# Patient Record
Sex: MALE | URBAN - METROPOLITAN AREA
[De-identification: names, ages, dates, MRNs, and addresses within clinical notes are randomized per-mention and may not be internally consistent; named-entity substitution may affect disease eponyms.]

---

## 2022-06-14 NOTE — PROGRESS NOTES
Clinic Note    Reason for visit:  There were no encounter diagnoses.    PCP: Primary Doctor No       HPI:  This is a 81 y.o. male with a h/o GERD on Zegerid and pepcid.  Had a capsule lodge in upper esophagus.  He had an CXR that was normal. He denies dysphagia to food.  Had an EGD w/DIL 8/21.  He reports weight gain-poor dietary habits.          Review of Systems   Constitutional: Positive for fatigue. Negative for chills, diaphoresis, fever and unexpected weight change.   HENT: Positive for trouble swallowing. Negative for hearing loss, mouth sores, nosebleeds, postnasal drip, sore throat and voice change.    Eyes: Negative for pain, discharge and eye dryness.   Respiratory: Positive for cough and shortness of breath. Negative for apnea, choking, chest tightness and wheezing.    Cardiovascular: Negative for chest pain, palpitations, leg swelling and claudication.   Gastrointestinal: Positive for reflux. Negative for abdominal distention, abdominal pain, anal bleeding, blood in stool, change in bowel habit, constipation, diarrhea, nausea, rectal pain, vomiting, fecal incontinence and change in bowel habit.   Genitourinary: Positive for frequency. Negative for bladder incontinence, difficulty urinating, dysuria, flank pain and hematuria.   Musculoskeletal: Positive for arthralgias. Negative for back pain, joint swelling and joint deformity.   Integumentary:  Negative for color change, rash and wound.   Allergic/Immunologic: Negative for environmental allergies and food allergies.   Neurological: Negative for seizures, facial asymmetry, speech difficulty, weakness, headaches and memory loss.   Hematological: Negative for adenopathy. Does not bruise/bleed easily.   Psychiatric/Behavioral: Positive for agitation. Negative for behavioral problems, confusion, hallucinations and sleep disturbance.      Past Medical History:   Diagnosis Date    Prostate cancer      Past Surgical History:   Procedure Laterality Date     "APPENDECTOMY      RADICAL PROSTATECTOMY      TONSILLECTOMY       Family History   Problem Relation Age of Onset    Heart disease Mother     Heart disease Father      Social History     Tobacco Use    Smoking status: Former Smoker     Review of patient's allergies indicates:  No Known Allergies           Vital Signs:  BP (P) 122/76 (BP Location: Right arm, Patient Position: Sitting)   Pulse (P) 69   Ht (P) 5' 7" (1.702 m)   Wt (P) 81 kg (178 lb 9.6 oz)   SpO2 (!) (P) 93%   BMI (P) 27.97 kg/m²   Body mass index is 27.97 kg/m² (pended).      Physical Exam  Constitutional:       General: He is not in acute distress.     Appearance: Normal appearance. He is well-developed. He is not ill-appearing or toxic-appearing.   HENT:      Head: Normocephalic and atraumatic.      Nose: Nose normal.      Mouth/Throat:      Mouth: Mucous membranes are moist.      Pharynx: Oropharynx is clear. No oropharyngeal exudate or posterior oropharyngeal erythema.   Eyes:      General: Lids are normal. No scleral icterus.        Right eye: No discharge.         Left eye: No discharge.      Extraocular Movements: Extraocular movements intact.      Conjunctiva/sclera: Conjunctivae normal.   Cardiovascular:      Rate and Rhythm: Normal rate and regular rhythm.      Pulses:           Radial pulses are 2+ on the right side and 2+ on the left side.   Pulmonary:      Effort: Pulmonary effort is normal. No respiratory distress.      Breath sounds: No stridor. No wheezing or rhonchi.   Abdominal:      General: Bowel sounds are normal. There is no distension.      Palpations: Abdomen is soft. There is no fluid wave, hepatomegaly, splenomegaly or mass.      Tenderness: There is no abdominal tenderness. There is no guarding or rebound.   Musculoskeletal:      Cervical back: Full passive range of motion without pain.      Right lower leg: No edema.      Left lower leg: No edema.   Lymphadenopathy:      Cervical: No cervical adenopathy.   Skin:     " General: Skin is warm and dry.      Capillary Refill: Capillary refill takes less than 2 seconds.      Coloration: Skin is not cyanotic, jaundiced or pale.      Findings: No rash.   Neurological:      General: No focal deficit present.      Mental Status: He is alert and oriented to person, place, and time.   Psychiatric:         Mood and Affect: Mood normal.         Behavior: Behavior is cooperative.            All of the data above and below has been reviewed by myself and any further interpretations will be reflected in the assessment and plan.   The data includes review of external notes, and independent interpretation of lab results, procedures, x-rays, and imaging reports.      Assessment:  There are no diagnoses linked to this encounter.     Recommendations:  Esophagram.    If esophagram is negative will consider MRI of neck.    Patient screened for and received weight management and nutritional counseling regarding BMI of less than 18 or greater than 25.    Risks, benefits, and alternatives of medical management, any associated procedures, and/or treatment discussed with the patient. Patient given opportunity to ask questions and voices understanding. Patient has elected to proceed with the recommended care modalities as discussed.    No follow-ups on file.    Order summary:  No orders of the defined types were placed in this encounter.         Yordy Sharp MD    This document may have been created using a voice recognition transcribing system. Incorrect words or phrases may have been missed during proofreading. Please interpret accordingly or contact me for clarification.

## 2022-06-15 ENCOUNTER — TELEPHONE (OUTPATIENT)
Dept: GASTROENTEROLOGY | Facility: CLINIC | Age: 82
End: 2022-06-15

## 2022-06-15 ENCOUNTER — OFFICE VISIT (OUTPATIENT)
Dept: GASTROENTEROLOGY | Facility: CLINIC | Age: 82
End: 2022-06-15
Payer: MEDICARE

## 2022-06-15 DIAGNOSIS — R13.19 ESOPHAGEAL DYSPHAGIA: Primary | ICD-10-CM

## 2022-06-15 DIAGNOSIS — K21.9 GASTROESOPHAGEAL REFLUX DISEASE, UNSPECIFIED WHETHER ESOPHAGITIS PRESENT: ICD-10-CM

## 2022-06-15 PROCEDURE — 99203 PR OFFICE/OUTPT VISIT, NEW, LEVL III, 30-44 MIN: ICD-10-PCS | Mod: S$GLB,,, | Performed by: INTERNAL MEDICINE

## 2022-06-15 PROCEDURE — 1160F PR REVIEW ALL MEDS BY PRESCRIBER/CLIN PHARMACIST DOCUMENTED: ICD-10-PCS | Mod: CPTII,S$GLB,, | Performed by: INTERNAL MEDICINE

## 2022-06-15 PROCEDURE — 1159F MED LIST DOCD IN RCRD: CPT | Mod: CPTII,S$GLB,, | Performed by: INTERNAL MEDICINE

## 2022-06-15 PROCEDURE — 1160F RVW MEDS BY RX/DR IN RCRD: CPT | Mod: CPTII,S$GLB,, | Performed by: INTERNAL MEDICINE

## 2022-06-15 PROCEDURE — 99203 OFFICE O/P NEW LOW 30 MIN: CPT | Mod: S$GLB,,, | Performed by: INTERNAL MEDICINE

## 2022-06-15 PROCEDURE — 1159F PR MEDICATION LIST DOCUMENTED IN MEDICAL RECORD: ICD-10-PCS | Mod: CPTII,S$GLB,, | Performed by: INTERNAL MEDICINE

## 2022-06-15 RX ORDER — OMEPRAZOLE AND SODIUM BICARBONATE 40; 1100 MG/1; MG/1
1 CAPSULE ORAL
COMMUNITY

## 2022-06-15 RX ORDER — FAMOTIDINE 20 MG/1
20 TABLET, FILM COATED ORAL 2 TIMES DAILY
COMMUNITY

## 2022-06-15 NOTE — TELEPHONE ENCOUNTER
----- Message from Irene Farrell sent at 6/15/2022 12:31 PM CDT -----  Regarding: CJ Almanza calling from [facility] Radiology Department calling for patient orders. Call back number [phone] option 1 and 3. Tks

## 2022-06-15 NOTE — TELEPHONE ENCOUNTER
Order was printed and given to patient due to request to have imaging done at [facility]. Order faxed and message left for patient.   ----- Message from Lynne Copeland sent at 6/15/2022 10:35 AM CDT -----  Regarding: pt advice  Contact: Pt  Pt is calling to find out where the order for esophogram was sent. Please call back at  [phone]//thank you acc

## 2022-06-15 NOTE — TELEPHONE ENCOUNTER
Returned Cami's call from [facility] Radiology Dept and told her that the orders were given to the pt, but I can fax them over. Faxing to [phone]. [facility]

## 2022-06-16 DIAGNOSIS — R13.19 ESOPHAGEAL DYSPHAGIA: Primary | ICD-10-CM

## 2022-06-16 NOTE — PROGRESS NOTES
MRI of soft tissue of neck recommended by Dr. Sharp. Order created and faxed to [facility].   [facility] [place] [facility]

## 2022-07-01 NOTE — PROGRESS NOTES
6/22/2022: MRI of soft tissue neck: No definite soft tissue abnormality seen. Degenerative changes noted in spine with mild levoscoliosis.

## 2023-04-19 ENCOUNTER — TELEPHONE (OUTPATIENT)
Dept: GASTROENTEROLOGY | Facility: CLINIC | Age: 83
End: 2023-04-19
Payer: MEDICARE

## 2023-04-19 DIAGNOSIS — R10.11 RUQ PAIN: Primary | ICD-10-CM

## 2023-04-19 DIAGNOSIS — R11.0 NAUSEA: ICD-10-CM

## 2023-04-19 NOTE — TELEPHONE ENCOUNTER
I spoke to Dr Whitley to let him know that an order for his US of the Abdomin was sent in to [facility] scheduling for him.

## 2023-04-19 NOTE — PROGRESS NOTES
Spoke to patient-having nausea and early satiety type symptoms-no significant abdominal pain.  He had labs done which were normal per patient.  He is under a lot of stress with wife recently having surgery on her back. Discussed increasing his Zegerid to 2 OTC per day.  We will set up abdominal U/S.  Asked if he wanted some Zofran-he declined.

## 2023-04-28 ENCOUNTER — TELEPHONE (OUTPATIENT)
Dept: GASTROENTEROLOGY | Facility: CLINIC | Age: 83
End: 2023-04-28
Payer: MEDICARE

## 2023-04-28 NOTE — TELEPHONE ENCOUNTER
Returned call to pt, he cancelled his appt for next week because his wife just had back surgery.  Gave him the results of his abd U/S, he has not really had nausea problems lately; he will call us if the nausea returns so we can order an EGD.

## 2023-04-28 NOTE — TELEPHONE ENCOUNTER
----- Message from Ayse French sent at 4/28/2023 12:06 PM CDT -----  Contact: self  Type - Patient Returning Call    Patient called in stating he received call/voicemail. I was informed clinic is on lunch. May I have someone reach back out @ [phone] - thanks!

## 2023-07-03 ENCOUNTER — TELEPHONE (OUTPATIENT)
Dept: GASTROENTEROLOGY | Facility: CLINIC | Age: 83
End: 2023-07-03
Payer: MEDICARE

## 2023-07-03 ENCOUNTER — PATIENT MESSAGE (OUTPATIENT)
Dept: GASTROENTEROLOGY | Facility: CLINIC | Age: 83
End: 2023-07-03
Payer: MEDICARE

## 2023-07-03 NOTE — TELEPHONE ENCOUNTER
----- Message from Ely Macdonald sent at 7/3/2023  3:54 PM CDT -----  Regarding: Cancellation  Contact: patient  Per phone call with patient, he would like to cancel the appointment on 07/10/2023 for the swallowing issues.  Please return call at [phone] (home).    Thanks,  SJ

## 2023-07-03 NOTE — TELEPHONE ENCOUNTER
Received patients message regarding cancelling his appointment. Cancelled patient appointment in Epic and sent patient a message via portal to let him know to contact our office if/when he is ready to reschedule. IRANA

## 2023-07-05 ENCOUNTER — TELEPHONE (OUTPATIENT)
Dept: GASTROENTEROLOGY | Facility: CLINIC | Age: 83
End: 2023-07-05
Payer: MEDICARE

## 2023-07-05 ENCOUNTER — DOCUMENTATION ONLY (OUTPATIENT)
Dept: GASTROENTEROLOGY | Facility: CLINIC | Age: 83
End: 2023-07-05
Payer: MEDICARE

## 2023-07-05 DIAGNOSIS — R13.10 DYSPHAGIA, UNSPECIFIED TYPE: Primary | ICD-10-CM

## 2023-07-05 DIAGNOSIS — R11.0 NAUSEA: ICD-10-CM

## 2023-07-05 NOTE — TELEPHONE ENCOUNTER
----- Message from Yordy Sharp MD sent at 7/5/2023  8:43 AM CDT -----  Regarding: RE: Follow Up  Contact: patient  Set up for EGD-DX: dysphagia, reflux. Instead of clinic visit  ----- Message -----  From: Felicia Phillips MA  Sent: 7/3/2023   2:55 PM CDT  To: Yordy Sharp MD  Subject: FW: Follow Up                                      ----- Message -----  From: Avni Langston  Sent: 7/3/2023   2:45 PM CDT  To: Felicia Phillips MA  Subject: RE: Follow Up                                    Pt is lou for 7/10/23 @ 1200 and pt is aware of date and time of apt. Also pt wants to know if aron would possibly just send him to have the proc rather than seeing him in office. 7/3/23 @ 2:45 la  ----- Message -----  From: Felicia Phillips MA  Sent: 7/3/2023   2:30 PM CDT  To: Avni Langston  Subject: FW: Follow Up                                    This is Dr. Whitley#  ----- Message -----  From: Ely Macdonald  Sent: 7/3/2023   1:40 PM CDT  To: [facility] Staff  Subject: Follow Up                                        Per phone call with patient, he would like to schedule and appointment for a follow up esophagus.  Please return call at [phone] (home).    Thanks,  SJ

## 2023-07-05 NOTE — PROGRESS NOTES
"[facility]  [address] [place] [address]  Phone: [phone]  Fax: [phone]    History & Physical         Provider: Dr. Yordy Sharp    Patient Name: Bi TURK (age):1940  82 y.o.           Gender: male   Phone: [phone]     Referring Physician: Kevan French     Vital Signs:   Height - 5'7"  Weight - 178 lbs  BMI -  27.9    Plan: EGD @ [facility]    Dysphagia   Nausea      History:      Past Medical History:   Diagnosis Date    Francois esophagus     Prostate cancer       Past Surgical History:   Procedure Laterality Date    APPENDECTOMY      RADICAL PROSTATECTOMY      TONSILLECTOMY        Medication List with Changes/Refills   Current Medications    FAMOTIDINE (PEPCID) 20 MG TABLET    Take 20 mg by mouth 2 (two) times daily.    OMEPRAZOLE-SODIUM BICARBONATE (ZEGERID) 40-1.1 MG-GRAM PER CAPSULE    Take 1 capsule by mouth before breakfast.      Review of patient's allergies indicates:  No Known Allergies   Family History   Problem Relation Age of Onset    Heart disease Mother     Heart disease Father       Social History     Tobacco Use    Smoking status: Former        Physical Examination:     General Appearance:___________________________  HEENT: _____________________________________  Abdomen:____________________________________  Heart:________________________________________  Lungs:_______________________________________  Extremities:___________________________________  Skin:_________________________________________  Endocrine:____________________________________  Genitourinary:_________________________________  Neurological:__________________________________      Patient has been evaluated immediately prior to sedation and is medically cleared for endoscopy with IVCS as an ASA class: ______      Physician Signature: _________________________       Date: ________  Time: " ________

## 2023-07-10 ENCOUNTER — TELEPHONE (OUTPATIENT)
Dept: GASTROENTEROLOGY | Facility: CLINIC | Age: 83
End: 2023-07-10

## 2023-07-10 RX ORDER — LISDEXAMFETAMINE DIMESYLATE CAPSULES 20 MG/1
20 CAPSULE ORAL EVERY MORNING
COMMUNITY

## 2023-07-10 NOTE — TELEPHONE ENCOUNTER
Schedule him on 7/19/2023 at [facility]. Hold Vyvanse 2 days before procedure.

## 2023-07-10 NOTE — TELEPHONE ENCOUNTER
Pt states he cant do the 19 because it is the day before he leaves for [place] and that would be cutting it too close. Any other day? -KG

## 2023-07-12 ENCOUNTER — TELEPHONE (OUTPATIENT)
Dept: GASTROENTEROLOGY | Facility: CLINIC | Age: 83
End: 2023-07-12
Payer: MEDICARE

## 2023-07-12 NOTE — TELEPHONE ENCOUNTER
----- Message from Dimas Small sent at 7/12/2023 11:07 AM CDT -----  Contact: Pt  Is calling to be placed on a cancellation list for his procedure and can be reached at [phone]//thanks/dbw

## 2023-07-12 NOTE — TELEPHONE ENCOUNTER
Returned callers call and informed him if anyone cancel we reach out to him and we do have him set on for a back up due to him going out of town 9/5/2023

## 2023-08-02 ENCOUNTER — TELEPHONE (OUTPATIENT)
Dept: GASTROENTEROLOGY | Facility: CLINIC | Age: 83
End: 2023-08-02
Payer: MEDICARE

## 2023-08-02 NOTE — TELEPHONE ENCOUNTER
I spoke to patient and he is having severe acid reflux with nausea.  Has been taking Zegrid but is not helping.  Is getting set up for Colonoscopy and EGD but needs some relief before then.

## 2023-08-02 NOTE — TELEPHONE ENCOUNTER
I spoke to patient and c/o nausea with severe acid reflux.  Is taking Zegrid but is not helping.  Would like something else that is not a BiCar[employer]  He is getting scheduled for a Colon and EGD.

## 2023-08-02 NOTE — TELEPHONE ENCOUNTER
----- Message from Dimas Small sent at 8/2/2023  3:12 PM CDT -----  Contact: Pt  Has some questions rg medication and can be reached at [phone]/thanks/dbw

## 2023-08-03 RX ORDER — ESOMEPRAZOLE MAGNESIUM 40 MG/1
40 CAPSULE, DELAYED RELEASE ORAL
Qty: 60 CAPSULE | Refills: 2 | Status: SHIPPED | OUTPATIENT
Start: 2023-08-03 | End: 2024-08-02

## 2023-08-03 NOTE — TELEPHONE ENCOUNTER
I spoke to patient and gave him recommendations.  He states he will call us next week with an update.

## 2023-08-14 ENCOUNTER — TELEPHONE (OUTPATIENT)
Dept: GASTROENTEROLOGY | Facility: CLINIC | Age: 83
End: 2023-08-14
Payer: MEDICARE

## 2023-08-14 NOTE — TELEPHONE ENCOUNTER
Reached out to pt and got him informed about change in location and he knows to pre reg 1 week before and voiced understood information and they will call w/ arrival time 1 day before.

## 2023-08-29 DIAGNOSIS — R11.0 NAUSEA: ICD-10-CM

## 2023-08-29 DIAGNOSIS — R13.10 DYSPHAGIA, UNSPECIFIED TYPE: Primary | ICD-10-CM

## 2023-08-29 NOTE — PROGRESS NOTES
"[facility]  [address] [place] [address]  Phone: [phone]  Fax: [phone]    History & Physical         Provider: Dr. Yordy Sharp    Patient Name: Bi Whitley Jr., DOB (age):1940  83 y.o.           Gender: male   Phone: [phone]     Referring Physician: Kevan French     Vital Signs:   Height - 5' 7"  Weight - 178 lbs  BMI -  27.9    Plan: EGD    Encounter Diagnoses   Name Primary?    Dysphagia, unspecified type Yes    Nausea            History:      Past Medical History:   Diagnosis Date    Francois esophagus     Prostate cancer       Past Surgical History:   Procedure Laterality Date    APPENDECTOMY      RADICAL PROSTATECTOMY      TONSILLECTOMY        Medication List with Changes/Refills   Current Medications    ESOMEPRAZOLE (NEXIUM) 40 MG CAPSULE    Take 1 capsule (40 mg total) by mouth 2 (two) times daily before meals.    FAMOTIDINE (PEPCID) 20 MG TABLET    Take 20 mg by mouth 2 (two) times daily.    LISDEXAMFETAMINE (VYVANSE) 20 MG CAPSULE    Take 20 mg by mouth every morning.    OMEPRAZOLE-SODIUM BICARBONATE (ZEGERID) 40-1.1 MG-GRAM PER CAPSULE    Take 1 capsule by mouth before breakfast.      Review of patient's allergies indicates:  No Known Allergies   Family History   Problem Relation Age of Onset    Heart disease Mother     Heart disease Father       Social History     Tobacco Use    Smoking status: Former        Physical Examination:     General Appearance:___________________________  HEENT: " _____________________________________  Abdomen:____________________________________  Heart:________________________________________  Lungs:_______________________________________  Extremities:___________________________________  Skin:_________________________________________  Endocrine:____________________________________  Genitourinary:_________________________________  Neurological:__________________________________      Patient has been evaluated immediately prior to sedation and is medically cleared for endoscopy with IVCS as an ASA class: ______      Physician Signature: _________________________       Date: ________  Time: ________

## 2023-09-05 ENCOUNTER — OUTSIDE PLACE OF SERVICE (OUTPATIENT)
Dept: GASTROENTEROLOGY | Facility: CLINIC | Age: 83
End: 2023-09-05
Payer: MEDICARE

## 2023-09-05 PROCEDURE — 43239 PR EGD, FLEX, W/BIOPSY, SGL/MULTI: ICD-10-PCS | Mod: ,,, | Performed by: INTERNAL MEDICINE

## 2023-09-05 PROCEDURE — 43239 EGD BIOPSY SINGLE/MULTIPLE: CPT | Mod: ,,, | Performed by: INTERNAL MEDICINE

## 2023-09-07 LAB — SPECIMEN TO PATHOLOGY: NORMAL

## 2023-09-07 NOTE — PROGRESS NOTES
Call with results,bx's of esophagus show mild reflux-continue PPI qD, bx's of stomach were normal-negative for H. pylori

## 2023-09-12 ENCOUNTER — TELEPHONE (OUTPATIENT)
Dept: SURGERY | Facility: CLINIC | Age: 83
End: 2023-09-12
Payer: MEDICARE

## 2023-09-12 NOTE — TELEPHONE ENCOUNTER
----- Message from Yordy Sharp MD sent at 9/7/2023 12:30 PM CDT -----  Call with results,bx's of esophagus show mild reflux-continue PPI qD, bx's of stomach were normal-negative for H. pylori

## 2024-08-26 ENCOUNTER — ANESTHESIA (OUTPATIENT)
Dept: INTERVENTIONAL RADIOLOGY/VASCULAR | Facility: HOSPITAL | Age: 84
End: 2024-08-26
Payer: MEDICARE

## 2024-08-26 ENCOUNTER — HOSPITAL ENCOUNTER (OUTPATIENT)
Dept: TELEMEDICINE | Facility: HOSPITAL | Age: 84
Discharge: HOME | End: 2024-08-26
Payer: MEDICARE

## 2024-08-26 ENCOUNTER — HOSPITAL ENCOUNTER (INPATIENT)
Dept: INTERVENTIONAL RADIOLOGY/VASCULAR | Facility: HOSPITAL | Age: 84
LOS: 8 days | Discharge: NURSING, REHAB OR LONG-TERM CARE | DRG: 101 | End: 2024-09-03
Attending: PSYCHIATRY & NEUROLOGY | Admitting: PSYCHIATRY & NEUROLOGY
Payer: MEDICARE

## 2024-08-26 DIAGNOSIS — I63.9 CEREBROVASCULAR ACCIDENT (CVA), UNSPECIFIED MECHANISM: ICD-10-CM

## 2024-08-26 DIAGNOSIS — Z13.9 RISK AND FUNCTIONAL ASSESSMENT: ICD-10-CM

## 2024-08-26 DIAGNOSIS — E88.09 NEUROPATHY ASSOCIATED WITH POLYNEUROPATHY, ORGANOMEGALY, ENDOCRINOPATHY, MONOCLONAL GAMMOPATHY, AND SKIN CHANGES SYNDROME: ICD-10-CM

## 2024-08-26 DIAGNOSIS — R00.0 TACHYCARDIA: ICD-10-CM

## 2024-08-26 DIAGNOSIS — G63 NEUROPATHY ASSOCIATED WITH POLYNEUROPATHY, ORGANOMEGALY, ENDOCRINOPATHY, MONOCLONAL GAMMOPATHY, AND SKIN CHANGES SYNDROME: ICD-10-CM

## 2024-08-26 DIAGNOSIS — I63.9 STROKE: ICD-10-CM

## 2024-08-26 DIAGNOSIS — R53.1 RIGHT SIDED WEAKNESS: Primary | ICD-10-CM

## 2024-08-26 LAB
EST. AVERAGE GLUCOSE BLD GHB EST-MCNC: 99.7 MG/DL
HBA1C MFR BLD: 5.1 %

## 2024-08-26 PROCEDURE — C1894 INTRO/SHEATH, NON-LASER: HCPCS | Performed by: PSYCHIATRY & NEUROLOGY

## 2024-08-26 PROCEDURE — 36224 PLACE CATH CAROTD ART: CPT | Mod: LT,,, | Performed by: PSYCHIATRY & NEUROLOGY

## 2024-08-26 PROCEDURE — B314YZZ FLUOROSCOPY OF LEFT COMMON CAROTID ARTERY USING OTHER CONTRAST: ICD-10-PCS | Performed by: PSYCHIATRY & NEUROLOGY

## 2024-08-26 PROCEDURE — 37000009 HC ANESTHESIA EA ADD 15 MINS: Performed by: PSYCHIATRY & NEUROLOGY

## 2024-08-26 PROCEDURE — 63600175 PHARM REV CODE 636 W HCPCS: Performed by: PSYCHIATRY & NEUROLOGY

## 2024-08-26 PROCEDURE — 25000003 PHARM REV CODE 250

## 2024-08-26 PROCEDURE — 36226 PLACE CATH VERTEBRAL ART: CPT | Mod: 51,LT,, | Performed by: PSYCHIATRY & NEUROLOGY

## 2024-08-26 PROCEDURE — 76937 US GUIDE VASCULAR ACCESS: CPT | Mod: 26,,, | Performed by: PSYCHIATRY & NEUROLOGY

## 2024-08-26 PROCEDURE — C1760 CLOSURE DEV, VASC: HCPCS | Performed by: PSYCHIATRY & NEUROLOGY

## 2024-08-26 PROCEDURE — 25500020 PHARM REV CODE 255: Performed by: PSYCHIATRY & NEUROLOGY

## 2024-08-26 PROCEDURE — C1887 CATHETER, GUIDING: HCPCS | Performed by: PSYCHIATRY & NEUROLOGY

## 2024-08-26 PROCEDURE — 25000003 PHARM REV CODE 250: Performed by: PSYCHIATRY & NEUROLOGY

## 2024-08-26 PROCEDURE — 63600175 PHARM REV CODE 636 W HCPCS: Performed by: NURSE ANESTHETIST, CERTIFIED REGISTERED

## 2024-08-26 PROCEDURE — B317YZZ FLUOROSCOPY OF LEFT INTERNAL CAROTID ARTERY USING OTHER CONTRAST: ICD-10-PCS | Performed by: PSYCHIATRY & NEUROLOGY

## 2024-08-26 PROCEDURE — 25000003 PHARM REV CODE 250: Performed by: NURSE ANESTHETIST, CERTIFIED REGISTERED

## 2024-08-26 PROCEDURE — 27201423 OPTIME MED/SURG SUP & DEVICES STERILE SUPPLY: Performed by: PSYCHIATRY & NEUROLOGY

## 2024-08-26 PROCEDURE — 20000000 HC ICU ROOM

## 2024-08-26 PROCEDURE — C1769 GUIDE WIRE: HCPCS | Performed by: PSYCHIATRY & NEUROLOGY

## 2024-08-26 PROCEDURE — 37000008 HC ANESTHESIA 1ST 15 MINUTES: Performed by: PSYCHIATRY & NEUROLOGY

## 2024-08-26 PROCEDURE — B31FYZZ FLUOROSCOPY OF LEFT VERTEBRAL ARTERY USING OTHER CONTRAST: ICD-10-PCS | Performed by: PSYCHIATRY & NEUROLOGY

## 2024-08-26 PROCEDURE — 36415 COLL VENOUS BLD VENIPUNCTURE: CPT | Performed by: NURSE PRACTITIONER

## 2024-08-26 PROCEDURE — B312YZZ FLUOROSCOPY OF LEFT SUBCLAVIAN ARTERY USING OTHER CONTRAST: ICD-10-PCS | Performed by: PSYCHIATRY & NEUROLOGY

## 2024-08-26 PROCEDURE — 83036 HEMOGLOBIN GLYCOSYLATED A1C: CPT | Performed by: NURSE PRACTITIONER

## 2024-08-26 PROCEDURE — 99223 1ST HOSP IP/OBS HIGH 75: CPT | Mod: 25,AI,, | Performed by: PSYCHIATRY & NEUROLOGY

## 2024-08-26 PROCEDURE — 36224 PLACE CATH CAROTD ART: CPT | Mod: 50

## 2024-08-26 PROCEDURE — 63600175 PHARM REV CODE 636 W HCPCS

## 2024-08-26 RX ORDER — ROCURONIUM BROMIDE 10 MG/ML
INJECTION, SOLUTION INTRAVENOUS
Status: DISCONTINUED | OUTPATIENT
Start: 2024-08-26 | End: 2024-08-26

## 2024-08-26 RX ORDER — SODIUM CHLORIDE 9 MG/ML
INJECTION, SOLUTION INTRAVENOUS CONTINUOUS
Status: DISCONTINUED | OUTPATIENT
Start: 2024-08-26 | End: 2024-08-30

## 2024-08-26 RX ORDER — IOPAMIDOL 755 MG/ML
50 INJECTION, SOLUTION INTRAVASCULAR
Status: COMPLETED | OUTPATIENT
Start: 2024-08-26 | End: 2024-08-26

## 2024-08-26 RX ORDER — HALOPERIDOL 5 MG/ML
1 INJECTION INTRAMUSCULAR ONCE
Status: COMPLETED | OUTPATIENT
Start: 2024-08-26 | End: 2024-08-26

## 2024-08-26 RX ORDER — PHENYLEPHRINE HYDROCHLORIDE 10 MG/ML
INJECTION INTRAVENOUS
Status: DISCONTINUED | OUTPATIENT
Start: 2024-08-26 | End: 2024-08-26

## 2024-08-26 RX ORDER — FAMOTIDINE 10 MG/ML
20 INJECTION INTRAVENOUS 2 TIMES DAILY
Status: DISCONTINUED | OUTPATIENT
Start: 2024-08-26 | End: 2024-08-29

## 2024-08-26 RX ORDER — ONDANSETRON HYDROCHLORIDE 2 MG/ML
4 INJECTION, SOLUTION INTRAVENOUS EVERY 8 HOURS PRN
Status: DISCONTINUED | OUTPATIENT
Start: 2024-08-26 | End: 2024-09-03 | Stop reason: HOSPADM

## 2024-08-26 RX ORDER — SODIUM CHLORIDE 0.9 % (FLUSH) 0.9 %
10 SYRINGE (ML) INJECTION
Status: DISCONTINUED | OUTPATIENT
Start: 2024-08-26 | End: 2024-09-03 | Stop reason: HOSPADM

## 2024-08-26 RX ORDER — LABETALOL HYDROCHLORIDE 5 MG/ML
10 INJECTION, SOLUTION INTRAVENOUS EVERY 6 HOURS PRN
Status: DISCONTINUED | OUTPATIENT
Start: 2024-08-26 | End: 2024-08-27

## 2024-08-26 RX ORDER — BISACODYL 10 MG/1
10 SUPPOSITORY RECTAL DAILY PRN
Status: DISCONTINUED | OUTPATIENT
Start: 2024-08-26 | End: 2024-09-03 | Stop reason: HOSPADM

## 2024-08-26 RX ORDER — DIPHENHYDRAMINE HYDROCHLORIDE 50 MG/ML
25 INJECTION INTRAMUSCULAR; INTRAVENOUS ONCE
Status: COMPLETED | OUTPATIENT
Start: 2024-08-27 | End: 2024-08-27

## 2024-08-26 RX ORDER — HYDRALAZINE HYDROCHLORIDE 20 MG/ML
10 INJECTION INTRAMUSCULAR; INTRAVENOUS EVERY 6 HOURS PRN
Status: DISCONTINUED | OUTPATIENT
Start: 2024-08-26 | End: 2024-08-27

## 2024-08-26 RX ORDER — MUPIROCIN 20 MG/G
OINTMENT TOPICAL 2 TIMES DAILY
Status: DISPENSED | OUTPATIENT
Start: 2024-08-28 | End: 2024-09-02

## 2024-08-26 RX ADMIN — HALOPERIDOL LACTATE 1 MG: 5 INJECTION, SOLUTION INTRAMUSCULAR at 10:08

## 2024-08-26 RX ADMIN — SUGAMMADEX 200 MG: 100 INJECTION, SOLUTION INTRAVENOUS at 02:08

## 2024-08-26 RX ADMIN — SODIUM CHLORIDE: 9 INJECTION, SOLUTION INTRAVENOUS at 03:08

## 2024-08-26 RX ADMIN — SODIUM CHLORIDE, SODIUM GLUCONATE, SODIUM ACETATE, POTASSIUM CHLORIDE AND MAGNESIUM CHLORIDE: 526; 502; 368; 37; 30 INJECTION, SOLUTION INTRAVENOUS at 02:08

## 2024-08-26 RX ADMIN — FAMOTIDINE 20 MG: 10 INJECTION, SOLUTION INTRAVENOUS at 08:08

## 2024-08-26 RX ADMIN — LEVETIRACETAM 500 MG: 100 INJECTION, SOLUTION INTRAVENOUS at 08:08

## 2024-08-26 RX ADMIN — DEXTROSE MONOHYDRATE 250 MG: 50 INJECTION, SOLUTION INTRAVENOUS at 10:08

## 2024-08-26 RX ADMIN — ROCURONIUM BROMIDE 50 MG: 10 SOLUTION INTRAVENOUS at 02:08

## 2024-08-26 RX ADMIN — IOPAMIDOL 50 ML: 755 INJECTION, SOLUTION INTRAVENOUS at 02:08

## 2024-08-26 RX ADMIN — PHENYLEPHRINE HYDROCHLORIDE 100 MCG: 10 INJECTION INTRAVENOUS at 02:08

## 2024-08-26 NOTE — H&P
[facility] - 7th Floor ICU  Pulmonary Critical Care Note    Patient Name: Bi Whitley Jr.  MRN: [id number]  Admission Date: 8/26/2024  Hospital Length of Stay: 0 days  Code Status: Full Code  Attending Provider: Myke Santamaria MD  Primary Care Provider: Kevan French MD     Subjective:     HPI:   Patient is a 84 year old male w/ PMHx of Prostate cancer, History of Smoking who presents w/ progressive right sided weakness and new onset seizures. His LKN was around 0900 while he was at the gym and started to note subtle RUE weakness and slurred speech. Symptoms gradually worsened and patient presented to outside facility. Patient had seizure like activity w/ rhythmic jerking of his RUE and facial twitching upon examination of telemedicine vascular neurology. Patient received 6 mg IV Ativan, Keppra, and TNK (anywhere between 1100 - 1200).  He was subsequently intubated. CT head and neck showed no acute intracranial abnormalities  specifically no early signs of ischemia and no intracranial hemorrhage. No LVO, no hemodynamically significant stenosis but limited due to motion artifact.   Patient transferred straight to cath lab. Cerebral angiogram revealed no evidence of LVO or flow limiting stenosis. Patient extubated after procedure.  Upon my encounter, patient mildly agitated and non verbal, on 1.5 L O2 via NC and SpO2 > 96%, VSS. Moving all extremities spontaneously and protecting his airway. Critical care medicine consulted for admission to ICU for q1h neuro checks.     Hospital Course/Significant events:  8/26/2024: Admit to ICU    24 Hour Interval History:  N/a    Past Medical History:   Diagnosis Date    Francois esophagus     Prostate cancer        Past Surgical History:   Procedure Laterality Date    APPENDECTOMY      RADICAL PROSTATECTOMY      TONSILLECTOMY         Social History     Socioeconomic History    Marital status:    Tobacco Use    Smoking status: Former            Current Outpatient Medications   Medication Instructions    esomeprazole (NEXIUM) 40 mg, Oral, 2 times daily before meals    famotidine (PEPCID) 20 mg, Oral, 2 times daily    lisdexamfetamine (VYVANSE) 20 mg, Oral, Every morning    omeprazole-sodium bicarbonate (ZEGERID) 40-1.1 mg-gram per capsule 1 capsule, Oral, Before breakfast       Current Inpatient Medications      Current Intravenous Infusions   0.9% NaCl   Intravenous Continuous 75 mL/hr at 08/26/24 1545 New Bag at 08/26/24 1545    0.9% NaCl   Intravenous Continuous             Review of Systems   Unable to perform ROS: Acuity of condition          Objective:     No intake or output data in the 24 hours ending 08/26/24 1607      Vital Signs (Most Recent):  Temp: 97.2 °F (36.2 °C) (08/26/24 1456)  Pulse: 70 (08/26/24 1545)  Resp: 14 (08/26/24 1545)  BP: (!) 150/83 (08/26/24 1545)  SpO2: (!) 94 % (08/26/24 1545)  There is no height or weight on file to calculate BMI.    Vital Signs (24h Range):  Temp:  [97.2 °F (36.2 °C)] 97.2 °F (36.2 °C)  Pulse:  [67-95] 70  Resp:  [12-14] 14  SpO2:  [94 %-98 %] 94 %  BP: (128-150)/(71-94) 150/83     Physical Exam  Vitals and nursing note reviewed.   Constitutional:       Appearance: Normal appearance.   HENT:      Head: Normocephalic and atraumatic.      Mouth/Throat:      Mouth: Mucous membranes are moist.   Eyes:      Pupils: Pupils are equal, round, and reactive to light.   Cardiovascular:      Rate and Rhythm: Normal rate and regular rhythm.      Pulses: Normal pulses.      Heart sounds: No murmur heard.  Pulmonary:      Effort: No respiratory distress.      Breath sounds: Rhonchi present. No wheezing.   Abdominal:      General: Bowel sounds are normal.      Palpations: Abdomen is soft.      Tenderness: There is no abdominal tenderness.   Musculoskeletal:      Right lower leg: No edema.      Left lower leg: No edema.   Skin:     General: Skin is warm.      Capillary Refill: Capillary refill takes less than 2  "seconds.   Neurological:      Comments: Non verbal and not following commands likely coming out of sedation  Spontaneously moves all 4 extremities  Pupillary and corneal reflex present     Psychiatric:      Comments: Agitated           Lines/Drains/Airways       Peripheral Intravenous Line  Duration                  Peripheral IV - Single Lumen 08/26/24 1545 20 G 1 1/4 in Anterior;Distal;Left Forearm <1 day         Peripheral IV - Single Lumen 08/26/24 1547 20 G No Left Antecubital <1 day                    Significant Labs:    No results found for: "WBC", "HGB", "HCT", "MCV", "PLT"        BMP  No results found for: "NA", "K", "CHLORIDE", "CO2", "BUN", "CREATININE", "CALCIUM", "AGAP", "ESTGFRAFRICA", "EGFRNONAA"      ABG  No results for input(s): "PH", "PO2", "PCO2", "HCO3", "POCBASEDEF" in the last 168 hours.    Mechanical Ventilation Support:         Significant Imaging:  I have reviewed the pertinent imaging within the past 24 hours.        Assessment/Plan:     Assessment  Stroke vs small vessel lacunar stroke vs breakthrough seizure  S/p TNK on 8/26/2024 around (11 to 12 pm)  S/p cerebral angiogram with no LVO or flow limiting stenosis      Plan  Admit to ICU for q1h neuro checks  SBP goal of < 180/105  HOB flat s/p TNK  Hold all antiplatelet and anticoagulants  Continue IV Keppra 500 mg BID  Neurology consulted  A1C, TSH and lipid panel pending  TTE with bubble ordered  CXR ordered   CT head without contrast 24 hours s/p TNK   MRI brain wo contrast tomorrow  PT/OT/ SLP eval and treat    DVT Prophylaxis: SCDs  GI Prophylaxis: Famotidine     32 minutes of critical care was time spent personally by me on the following activities: development of treatment plan with patient or surrogate and bedside caregivers, discussions with consultants, evaluation of patient's response to treatment, examination of patient, ordering and performing treatments and interventions, ordering and review of laboratory studies, ordering " and review of radiographic studies, pulse oximetry, re-evaluation of patient's condition.  This critical care time did not overlap with that of any other provider or involve time for any procedures.     Broderick Hernandez MD  Pulmonary Critical Care Medicine  [facility] - 7th Floor ICU  DOS: 08/26/2024

## 2024-08-26 NOTE — OP NOTE
[facility]                       [address] [place] [id number]     PATIENT NAME:Bi Whitley Jr.    YOB: 1940      DATE OF SURGERY:8/26/2024      SURGEON:  Andi Murphy MD     PREOPERATIVE DIAGNOSIS:  left MCA syndrome     POSTOPERATIVE DIAGNOSIS:  no evidence of Large vessel occlusion or flow limiting stenosis     PROCEDURE PERFORMED:    Cerebral angiogram with catheter insertion in the following arteries:  left common carotid, left internal carotid, left subclavian, left vertebral.  Interpretation of images  Ultrasound-guided right femoral artery access  Right groin closure with mynx     LEVEL OF SEDATION:  GA     TOTAL INTRASERVICE SEDATION TIME:  15 minutes.     COMPLICATIONS:  None.    APPROACH:  Right femoral artery      VESSELS SELECTIVELY CATHETERIZED:     Left common carotid artery  Left internal carotid artery   Left subclavian artery   Left vertebral artery    DEVICES USED:  5 Fr short sheath  5 Fr Angled catheter  035 glidewire  Mynx  Micropuncture kit       INDICATION:  84 y.o. years old male with a no significant history presented to OSH after he developed right sided weakness while working out in gym. His weakness subsequently worsened and developed flaccid weakness and twitching. He was administered ativan and intubated for airway protection. CTA was limited due to motion artifact. He was transferred for possible intervention due to concern for left MCA syndrome.     DETAILED DESCRIPTION:      Emergency consent was obtained as no family available at bedside.     The right femoral artery was sonographically evaluated and judged to be patent.  Real-time ultrasound was used to visualize needle entry into the vessel and a permanent image was stored. Using a micropuncture kit modified Seldinger technique,, an arterial sheath was placed the right femoral artery.  Diagnostic catheter telescoping over the 035  glidewire advanced to the arch and double flushed.  Enumerated vessels were then selectively catheterized and angiograms obtained as listed below.  Multiple cervical and intracranial runs were obtained in AP and lateral projection.  The films were reviewed and determined to be of good diagnostic quality.  Diagnostic catheter and sheath were then withdrawn and hemostasis was obtained with the above closure device.  No immediate complications were noted.  Patient tolerated the procedure well.    Angiograms performed and findings:    Right femoral artery:  Sheath placement in the right common femoral artery above the femoral bifurcation.  There is no evidence of stenosis, dissection, atherosclerosis or contrast extravasation at the site of puncture.  Left common carotid artery - cervical:  Unremarkable left common carotid and carotid bifurcation.  The left internal carotid artery is widely patent distally the left external carotid artery and its branches appear unremarkable.    Left internal carotid artery - cerebral:  The distal cervical, petrous, cavernous, supraclinoid segments of the left ICA appeared patent. There is normal flow and branching noted in the left CHAIM and MCA territory.  The capillary and venous phases appear unremarkable.  Left subclavian artery - cervical:  Normal origin and cervical course of the left vertebral artery.    Left vertebral artery - cerebral:  Left vertebral artery is codominant.  Unremarkable distal cervical and intracranial segments of the left vertebral artery.  Left PICA, bilateral AICA, SCA, PCA visualized.  The basilar artery appears patent.  There is normal capillary and venous phase.      OVERALL IMPRESSION:  No evidence of large vessel occlusion or flow limiting stenosis noted. No obvious evidence of vascular malformation noted.         ______________________________  Andi Murphy MD  Vascular and Interventional Neurology

## 2024-08-26 NOTE — SUBJECTIVE & OBJECTIVE
Past Medical History:   Diagnosis Date    Francois esophagus     Prostate cancer        Past Surgical History:   Procedure Laterality Date    APPENDECTOMY      RADICAL PROSTATECTOMY      TONSILLECTOMY         Review of patient's allergies indicates:  No Known Allergies      No current facility-administered medications on file prior to encounter.     Current Outpatient Medications on File Prior to Encounter   Medication Sig    esomeprazole (NEXIUM) 40 MG capsule Take 1 capsule (40 mg total) by mouth 2 (two) times daily before meals.    famotidine (PEPCID) 20 MG tablet Take 20 mg by mouth 2 (two) times daily.    lisdexamfetamine (VYVANSE) 20 MG capsule Take 20 mg by mouth every morning.    omeprazole-sodium bicarbonate (ZEGERID) 40-1.1 mg-gram per capsule Take 1 capsule by mouth before breakfast.     Family History       Problem Relation (Age of Onset)    Heart disease Mother, Father          Tobacco Use    Smoking status: Former    Smokeless tobacco: Not on file   Substance and Sexual Activity    Alcohol use: Not on file    Drug use: Not on file    Sexual activity: Not on file     Review of Systems   Unable to perform ROS: Acuity of condition      Objective:     Vital Signs (Most Recent):  Temp: 97.2 °F (36.2 °C) (08/26/24 1456)  Pulse: 70 (08/26/24 1545)  Resp: 14 (08/26/24 1545)  BP: (!) 150/83 (08/26/24 1545)  SpO2: (!) 94 % (08/26/24 1545) Vital Signs (24h Range):  Temp:  [97.2 °F (36.2 °C)] 97.2 °F (36.2 °C)  Pulse:  [67-95] 70  Resp:  [12-14] 14  SpO2:  [94 %-98 %] 94 %  BP: (128-150)/(71-94) 150/83        There is no height or weight on file to calculate BMI.     Physical Exam  Vitals and nursing note reviewed.   Constitutional:       General: He is not in acute distress.     Appearance: He is not toxic-appearing.      Interventions: He is restrained.   HENT:      Mouth/Throat:      Mouth: Mucous membranes are moist.   Eyes:      Conjunctiva/sclera: Conjunctivae normal.   Cardiovascular:      Rate and Rhythm:  Normal rate.          NEUROLOGICAL EXAMINATION:     MENTAL STATUS        Limited exam, not consistently commands. Moves all extremities spontaneous. Moans.           Significant Labs:   Recent Lab Results       None            Significant Imaging: I have reviewed all pertinent imaging results/findings within the past 24 hours.

## 2024-08-26 NOTE — TELEMEDICINE CONSULT
[facility]  Vascular Neurology  Comprehensive Stroke Center  TeleVascular Neurology Acute Consultation Note        Consult Information  Consults    Consulting Provider: TONO RANKIN   Current Providers  No providers found    Patient Location: [facility] Emergency Department    Spoke hospital nurse at bedside with patient assisting consultant.  Patient information was obtained from spouse/SO, relative(s), and past medical records.       Stroke Documentation  Acute Stroke Times   Last Known Normal Date: 08/26/24  Last Known Normal Time: 0900  Symptom Onset Date: 08/26/24  Symptom Onset Time: 0900  Stroke Team Called Date: 08/26/24  Stroke Team Called Time: 1059  Stroke Team Arrival Date: 08/26/24  Stroke Team Arrival Time: 1107  CT Interpretation Time: 1107  Thrombolytic Therapy Recommended: Yes  CTA Interpretation Time: 1107  Thrombectomy Recommended: No    NIH Scale:  1a. Level of Consciousness: 2-->Not alert, requires repeated stimulation to attend, or is obtunded and requires strong or painful stimulation to make movements (not stereotyped)  1b. LOC Questions: 2-->Answers neither question correctly  1c. LOC Commands: 2-->Performs neither task correctly  2. Best Gaze: 0-->Normal  3. Visual: 0-->No visual loss  4. Facial Palsy: 0-->Normal symmetrical movements  5a. Motor Arm, Left: 0-->No drift, limb holds 90 (or 45) degrees for full 10 secs  5b. Motor Arm, Right: 2-->Some effort against gravity, limb cannot get to or maintain (if cued) 90 (or 45) degrees, drifts down to bed, but has some effort against gravity  6a. Motor Leg, Left: 2-->Some effort against gravity, leg falls to bed by 5 secs, but has some effort against gravity  6b. Motor Leg, Right: 2-->Some effort against gravity, leg falls to bed by 5 secs, but has some effort against gravity  7. Limb Ataxia: 0-->Absent  8. Sensory: 0-->Normal, no sensory loss  9. Best Language:  0-->No aphasia, normal  10. Dysarthria: (UN) Intubated or other physical barrier  11. Extinction and Inattention (formerly Neglect): 0-->No abnormality  Total (NIH Stroke Scale): 12      Modified Vamsi:    Doyle Coma Scale:     ABCD2 Score:    [id number] Score:    HAS -BLED Score:    ICH Score:    Hunt & Boo Classification:      There were no vitals taken for this visit.      In my opinion, this was a: Tier 1; VAN Stroke Assessment: Positive     Medical Decision Making  HPI:  84 y.o. male w/ PMHx of Prostate cancer, History of Smoking who presents w/ progressive RSW and new onset seizures.    LKW 09:00 while he was at the gym, noted to have some subtle RUE weakness and  slurred speech.   Went home and had progressive worsening of his symptoms where he agreed to go to the ED.   Upon my evaluation, patient is actively seizure w/ rhythmic jerking of his RUE and facial twitching.   /69    Images personally reviewed and interpreted:  Study: Head CT CTA H/N  Study Interpretation: No acute intracranial abnormalities, specifically no early signs of ischemia and no intracranial hemorrhage.   No LVO, no hemodynamically significant stenosis.    - Motion degraded imaging by no obvious LVO on independent review.         Assessment and plan:  NIHSS 12, but difficult to assess sensory and visual fields at the time due to active ongoing seizure.   Patient family at bedside to provide history and his medical background as some are medical professionals.   Patient has been recently taking a weight loss supplement.   Daughter is at bedside and is concerned about a stroke causing the seizures.   We discussed extensively the CTH findings and my interpretation of the CTA H/N.   Deficits appear disabling, although we discussed that currently the patient is having refractory seizures despite 6 mg of Ativan, we cannot r/o a small stroke at this time based on the imaging obtained.     No contraindications to IV thrombolytics.  Request attending physician to discuss risks and benefits with the patient and/or family members and proceed with TNK/alteplase once consent is obtained.   BP goal prior to IV thrombolytics - <185/110  BP goal post IV thrombolytics - <180/105 for at least 24 hrs.    Recommend further care and need for ICU monitoring as likely patient will require an intubation.    When obtaining advanced imaging, recommend MRI Brain w/ and w/o contrast.     Recommendations:  Lipid profile, A1c, TSH  ECHO w/ bubble study  PT/OT/SLP eval and Rx  IVF to allow for maximum cerebral perfusion  HOB flat   Hold AC and antiplatelets x 24 hour post TNK  High intensity statin for LDL goal <70 long term     Recommendations discussed w/ ER physician.       Lytics recommendation: Recommend IV Tenecteplase 0.25mg/kg IV push (max dose 25mg); If Tenecteplase is not available use Alteplase 0.9mg/kg IV bolus followed by infusion (max dose 90mg)     Additional Recommendations:   Neurological assessment and vital signs (except temperature) every 15 minutes x 2 hours, then every 30 minutes x 6 hours, then every hour x 16 hours..  Frequency of BP assessments may need to be increased if systolic BP stays >= 180 mm Hg or diastolic BP stays >= 105 mm Hg. Administer antihypertensive meds as ordered  Temperature every 4 hours or as required.  Follow hospital protocol for further orders re: post thrombolytic therapy patient management.  No antithrombotics or anticoagulants (including but not limited to: heparin, warfarin, aspirin, clopidigrel, or dipyridamole) for 24 hours, then start antithrombotics as ordered by treating physician    Adapted from the American Heart Association/American Stroke Association (AHA/ASA) and American Association of Neuroscience Nurses (AANN) Guidelines.   Thrombectomy recommendation: No; No large vessel occlusion identified on imaging   Placement recommendation: pending further studies  admit to inpatient             Actively  seizing w/ RUE and R facial tremors/twitching  Aphasic  Moving LUE and LLE spontaneously    Past Medical History:   Diagnosis Date    Francois esophagus     Prostate cancer      Past Surgical History:   Procedure Laterality Date    APPENDECTOMY      RADICAL PROSTATECTOMY      TONSILLECTOMY       Family History   Problem Relation Name Age of Onset    Heart disease Mother      Heart disease Father         Diagnoses  No problems updated.    Estrella Arambula MD      Emergent/Acute neurological consultation requested by spoke provider due to critical concerns for possible cerebrovascular event that could result in permanent loss of neurologic/bodily function, severe disability or death of this patient.  Immediate/timely evaluation by a highly prepared expert is paramount for optimal outcomes  High risk for neurological deterioration if not properly diagnosed  High risk for neurological deterioration if not treated promplty/as soon as possible  Complex diagnostic evaluation may be required (advanced imaging)  High risk treatment options (thrombolytics and/or thrombectomy)    Patient care was coordinated with spoke provider, including but not limted to    Discussing likely diagnosis/etiology of symptoms  Making recommendations for further diagnostic studies  Making recommendations for intravenous thrombolytics or other advanced therapies  Making recommendations for disposition (admission/transfer for higher level of care)

## 2024-08-26 NOTE — BRIEF OP NOTE
Name: Bi Whitley Jr.  MRN: [id number]  Age: 84 y.o.  Gender: male    Date of Procedure: 08/26/2024    Pre-operative Diagnosis: Concern for left MCA syndrome    Post-operative Diagnosis:  no evidence of LVO    Procedure: Diagnostic cerebral angiogram    Access/Closure: Right femoral artery access closed with mynx    Surgeon: Andi Murphy MD    Anesthesia: GA    EBL: min ml    Description of findings:  - no evidence of LVO or flow limiting stenosis    Patient condition:   stable    Implant/specimen(s):  none    Plan:  - extubated in IR  - SBP <180/105  - post tPA protocol  - admit in ICU for q1h neurochecks  - continue keppra 500 mg bid   - EEG  - consult neurology  - no antiplatelet/anticoagulation    Andi Murphy MD  Interventional Neurology

## 2024-08-26 NOTE — SUBJECTIVE & OBJECTIVE
HPI:  84 y.o. male w/ PMHx of Prostate cancer, History of Smoking who presents w/ progressive RSW and new onset seizures.    LKW 09:00 while he was at the gym, noted to have some subtle RUE weakness and  slurred speech.   Went home and had progressive worsening of his symptoms where he agreed to go to the ED.   Upon my evaluation, patient is actively seizure w/ rhythmic jerking of his RUE and facial twitching.   /69    Images personally reviewed and interpreted:  Study: Head CT CTA H/N  Study Interpretation: No acute intracranial abnormalities, specifically no early signs of ischemia and no intracranial hemorrhage.   No LVO, no hemodynamically significant stenosis.    - Motion degraded imaging by no obvious LVO on independent review.         Assessment and plan:  NIHSS 12, but difficult to assess sensory and visual fields at the time due to active ongoing seizure.   Patient family at bedside to provide history and his medical background as some are medical professionals.   Patient has been recently taking a weight loss supplement.   Daughter is at bedside and is concerned about a stroke causing the seizures.   We discussed extensively the CTH findings and my interpretation of the CTA H/N.   Deficits appear disabling, although we discussed that currently the patient is having refractory seizures despite 6 mg of Ativan, we cannot r/o a small stroke at this time based on the imaging obtained.     No contraindications to IV thrombolytics. Request attending physician to discuss risks and benefits with the patient and/or family members and proceed with TNK/alteplase once consent is obtained.   BP goal prior to IV thrombolytics - <185/110  BP goal post IV thrombolytics - <180/105 for at least 24 hrs.    Recommend further care and need for ICU monitoring as likely patient will require an intubation.    When obtaining advanced imaging, recommend MRI Brain w/ and w/o contrast.     Recommendations:  Lipid profile, A1c,  TSH  ECHO w/ bubble study  PT/OT/SLP eval and Rx  IVF to allow for maximum cerebral perfusion  HOB flat   Hold AC and antiplatelets x 24 hour post TNK  High intensity statin for LDL goal <70 long term     Recommendations discussed w/ ER physician.       Lytics recommendation: Recommend IV Tenecteplase 0.25mg/kg IV push (max dose 25mg); If Tenecteplase is not available use Alteplase 0.9mg/kg IV bolus followed by infusion (max dose 90mg)     Additional Recommendations:   Neurological assessment and vital signs (except temperature) every 15 minutes x 2 hours, then every 30 minutes x 6 hours, then every hour x 16 hours..  Frequency of BP assessments may need to be increased if systolic BP stays >= 180 mm Hg or diastolic BP stays >= 105 mm Hg. Administer antihypertensive meds as ordered  Temperature every 4 hours or as required.  Follow hospital protocol for further orders re: post thrombolytic therapy patient management.  No antithrombotics or anticoagulants (including but not limited to: heparin, warfarin, aspirin, clopidigrel, or dipyridamole) for 24 hours, then start antithrombotics as ordered by treating physician    Adapted from the American Heart Association/American Stroke Association (AHA/ASA) and American Association of Neuroscience Nurses (AANN) Guidelines.   Thrombectomy recommendation: No; No large vessel occlusion identified on imaging   Placement recommendation: pending further studies  admit to inpatient

## 2024-08-26 NOTE — HPI
Bi Whitley . is a 84 y.o. male with past medical history prostate cancer, hypothyroidism, former cigarette smoker, quit smoking over 40 years ago smoker presents as transfer from [facility] for interventional neurology, Dr. Murphy accepting physician. 8/26/2024 presented to [facility] ED in [place] related to acute onset right sided weakness with aphasia. Last known normal 900 am per vascular documentation. Arrived [facility] ED 1040 am, telestroke 1057am received TNK 1130 am at [facility] ED. Per transfer records CTH negative. CTA motion artifact without occlusion, stenosis, or dissection. Patient was VAN positive at [facility] ED. Upon arrive to [facility] patient went directly to cath lab for cerebral angiogram with Dr. Murphy.

## 2024-08-26 NOTE — ASSESSMENT & PLAN NOTE
- presented with right sided weakness, aphasia status post TNK at 1130 am on 8/26/2024 at [facility] in [place], [place].   - Stroke RF: former smoker   - Intervention: TNK   - Etiology: TBD    Stroke workup:  -CTh: Negative   -CTA h/n:  Motion artifact without occlusion, stenosis or dissection   -MRI brain: ordered    -ECHO: ordered    -CUS: ordered  -LDL: ordered   -A1c: ordered   -TSH: ordered    -home medications listed include:  Nexium 40 mg, Pepcid 20 mg, Zegerid, Vyvanse 20 mg,      Plan:  S/p TNK  -permissive HTN for now ... SBP less than 180  -q1hr neuro checks  -STAT CTh for any HA or neuro change  -HOB flat, Bedrest, NPO x24 hours post TNK  -repeat CTh after 24 hours to determine if antiplatelet therapy can be initiated  -continue stroke workup  -PT/OT/ST to evaluate

## 2024-08-26 NOTE — TRANSFER OF CARE
Anesthesia Transfer of Care Note    Patient: Bi Whitley     Procedure(s) Performed: * No procedures listed *    Patient location: ICU    Anesthesia Type: general    Transport from OR: Transported from OR on 2-3 L/min O2 by NC with adequate spontaneous ventilation. Continuous ECG monitoring in transport. Continuous SpO2 monitoring in transport. Continuos invasive BP monitoring in transport    Post pain: adequate analgesia    Post assessment: no apparent anesthetic complications    Post vital signs: stable    Level of consciousness: responds to stimulation    Nausea/Vomiting: no nausea/vomiting    Complications: none    Transfer of care protocol was followed      Last vitals: Visit Vitals  /71   Pulse 67   Temp 36.2 °C (97.2 °F)   Resp 12   SpO2 98%

## 2024-08-26 NOTE — CONSULTS
Interventional Neurology Consult Note    Consult Requested by:  ED  Reason for Consult:  right sided weakness  SUBJECTIVE:     History of Present Illness:  Patient is a 84 y.o. male presents with acute onset of right sided weakness while working out in the gym. Was noted to have behavioral changes and then started having seizure while in the ED. LKN 9 AM. CTH neg for hemorrhage. CTA non diagnostic due to motion artifact but no obvious LVO noted. He received IV thrombolytic at OSH and was transferred for intervention. NIHSS 12    Review of patient's allergies indicates:  No Known Allergies    Past Medical History:   Diagnosis Date    Francois esophagus     Prostate cancer      Past Surgical History:   Procedure Laterality Date    APPENDECTOMY      RADICAL PROSTATECTOMY      TONSILLECTOMY       Family History   Problem Relation Name Age of Onset    Heart disease Mother      Heart disease Father       Social History     Tobacco Use    Smoking status: Former       Review of Systems:  *ROS*    OBJECTIVE:     Vital Signs:  Temp:  [97.2 °F (36.2 °C)]   Pulse:  [67-95]   Resp:  [12-14]   BP: (128-150)/(71-94)   SpO2:  [94 %-98 %]     Physical Exam:  Exam is limited due to intubation and sedation. Patient appeared to move LUE spontaneously trying to grab the ET tube. Some movement noted in RUE. Withdrew to pain in both LE. Pupils equal and reactive. Not following commands.     Laboratory:  I have reviewed all pertinent lab results within the past 24 hours.    Diagnostic Results:  CTH neg for hemorrhage.     ASSESSMENT/PLAN:     84 M with acute onset right sided weakness, seizure, intubated for airway protection. CTH neg for hemorrhage. LKN 9 AM. Received IV thrombolytic at OSH and transferred to [facility] [place] for intervention.     On arrival, he underwent DSA which did not demonstrate any LVO or flow limiting stenosis.     Suspect breakthrough seizure vs IV thrombolytic aborted stroke vs small vessel lacunar stroke.     Plan:   - admit  in ICU for q1h neurochecks  - MRI brain when able.  - - SBP <180/105  - post tPA protocol  - admit in ICU for q1h neurochecks  - continue keppra 500 mg bid   - EEG  - consult neurology  - no antiplatelet/anticoagulation  - echo with bubble study  - tele  - bed rest with HOB 30 degrees.     Neurology team to follow.     Andi Murphy MD  Vascular and Interventional Neurology

## 2024-08-26 NOTE — CONSULTS
[facility] - 7th Floor ICU  Neurology  Consult Note    Patient Name: Bi Whitley Jr.  MRN: [id number]  Admission Date: 8/26/2024  Hospital Length of Stay: 0 days  Code Status: Full Code   Attending Provider: Myke Santamaria MD   Consulting Provider: Kinaa Gant NP  Primary Care Physician: Kevan French MD  Principal Problem:<principal problem not specified>    Inpatient consult to Vascular (Stroke) Neurology  Consult performed by: Kiana Gant NP  Consult ordered by: Andi Murphy MD         Subjective:     Chief Complaint:  Right sided weakness, aphasia      HPI:   Bi Whitley Jr. is a 84 y.o. male with past medical history prostate cancer, hypothyroidism, former cigarette smoker, quit smoking over 40 years ago smoker presents as transfer from [facility] for interventional neurology, Dr. Murphy accepting physician. 8/26/2024 presented to [facility] ED in [place] related to acute onset right sided weakness with aphasia. Last known normal 900 am per vascular documentation. Arrived [facility] ED 1040 am, telestroke 1057am received TNK 1130 am at [facility] ED. Per transfer records CTH negative. CTA motion artifact without occlusion, stenosis, or dissection. Patient was VAN positive at [facility] ED. Upon arrive to [facility] patient went directly to cath lab for cerebral angiogram with Dr. Murphy.           Past Medical History:   Diagnosis Date    Francois esophagus     Prostate cancer        Past Surgical History:   Procedure Laterality Date    APPENDECTOMY      RADICAL PROSTATECTOMY      TONSILLECTOMY         Review of patient's allergies indicates:  No Known Allergies      No current facility-administered medications on file prior to encounter.     Current Outpatient Medications on File Prior to Encounter   Medication Sig    esomeprazole (NEXIUM) 40 MG capsule Take 1 capsule (40 mg total) by mouth 2 (two) times daily before meals.     famotidine (PEPCID) 20 MG tablet Take 20 mg by mouth 2 (two) times daily.    lisdexamfetamine (VYVANSE) 20 MG capsule Take 20 mg by mouth every morning.    omeprazole-sodium bicarbonate (ZEGERID) 40-1.1 mg-gram per capsule Take 1 capsule by mouth before breakfast.     Family History       Problem Relation (Age of Onset)    Heart disease Mother, Father          Tobacco Use    Smoking status: Former    Smokeless tobacco: Not on file   Substance and Sexual Activity    Alcohol use: Not on file    Drug use: Not on file    Sexual activity: Not on file     Review of Systems   Unable to perform ROS: Acuity of condition      Objective:     Vital Signs (Most Recent):  Temp: 97.2 °F (36.2 °C) (08/26/24 1456)  Pulse: 70 (08/26/24 1545)  Resp: 14 (08/26/24 1545)  BP: (!) 150/83 (08/26/24 1545)  SpO2: (!) 94 % (08/26/24 1545) Vital Signs (24h Range):  Temp:  [97.2 °F (36.2 °C)] 97.2 °F (36.2 °C)  Pulse:  [67-95] 70  Resp:  [12-14] 14  SpO2:  [94 %-98 %] 94 %  BP: (128-150)/(71-94) 150/83        There is no height or weight on file to calculate BMI.     Physical Exam  Vitals and nursing note reviewed.   Constitutional:       General: He is not in acute distress.     Appearance: He is not toxic-appearing.      Interventions: He is restrained.   HENT:      Mouth/Throat:      Mouth: Mucous membranes are moist.   Eyes:      Conjunctiva/sclera: Conjunctivae normal.   Cardiovascular:      Rate and Rhythm: Normal rate.          NEUROLOGICAL EXAMINATION:     MENTAL STATUS        Limited exam, not consistently commands. Moves all extremities spontaneous. Moans.           Significant Labs:   Recent Lab Results       None            Significant Imaging: I have reviewed all pertinent imaging results/findings within the past 24 hours.  Assessment and Plan:     Right sided weakness  - presented with right sided weakness, aphasia status post TNK at 1130 am on 8/26/2024 at [facility] in [place], [place].   - Stroke RF: former smoker    - Intervention: TNK   - Etiology: TBD    Stroke workup:  -CTh: Negative   -CTA h/n:  Motion artifact without occlusion, stenosis or dissection   -MRI brain: ordered    -ECHO: ordered    -CUS: ordered  -LDL: ordered   -A1c: ordered   -TSH: ordered    -home medications listed include:  Nexium 40 mg, Pepcid 20 mg, Zegerid, Vyvanse 20 mg,      Plan:  S/p TNK  -permissive HTN for now ... SBP less than 180  -q1hr neuro checks  -STAT CTh for any HA or neuro change  -HOB flat, Bedrest, NPO x24 hours post TNK  -repeat CTh after 24 hours to determine if antiplatelet therapy can be initiated  -continue stroke workup  -PT/OT/ST to evaluate           VTE Risk Mitigation (From admission, onward)           Ordered     Reason for No Pharmacological VTE Prophylaxis  Once        Question:  Reasons:  Answer:  Risk of Bleeding    08/26/24 1604     IP VTE HIGH RISK PATIENT  Once         08/26/24 1604     Place sequential compression device  Until discontinued         08/26/24 1604                    Thank you for your consult. Stroke Neurology will follow patient.      Kiana Gant NP  Neurology  [facility] - 7th Floor ICU    I have seen/examined the patient.  NP was scribe.  I agree with the findings unless outlined below:    Radhames Melgoza MD  [facility]     Pt sedated for scans when I saw him  Pt has been very agitated throughout the hospitalziation and we have had to medicate  Will follow

## 2024-08-27 LAB
ALBUMIN SERPL-MCNC: 3.8 G/DL (ref 3.4–4.8)
ALBUMIN/GLOB SERPL: 1.6 RATIO (ref 1.1–2)
ALP SERPL-CCNC: 43 UNIT/L (ref 40–150)
ALT SERPL-CCNC: 18 UNIT/L (ref 0–55)
ANION GAP SERPL CALC-SCNC: 12 MEQ/L
AST SERPL-CCNC: 35 UNIT/L (ref 5–34)
BASOPHILS # BLD AUTO: 0.06 X10(3)/MCL
BASOPHILS NFR BLD AUTO: 0.5 %
BILIRUB SERPL-MCNC: 1 MG/DL
BUN SERPL-MCNC: 11.9 MG/DL (ref 8.4–25.7)
CALCIUM SERPL-MCNC: 9 MG/DL (ref 8.8–10)
CHLORIDE SERPL-SCNC: 111 MMOL/L (ref 98–107)
CHOLEST SERPL-MCNC: 213 MG/DL
CHOLEST/HDLC SERPL: 4 {RATIO} (ref 0–5)
CO2 SERPL-SCNC: 20 MMOL/L (ref 23–31)
CREAT SERPL-MCNC: 0.88 MG/DL (ref 0.73–1.18)
CREAT/UREA NIT SERPL: 14
EOSINOPHIL # BLD AUTO: 0.03 X10(3)/MCL (ref 0–0.9)
EOSINOPHIL NFR BLD AUTO: 0.3 %
ERYTHROCYTE [DISTWIDTH] IN BLOOD BY AUTOMATED COUNT: 12.4 % (ref 11.5–17)
GFR SERPLBLD CREATININE-BSD FMLA CKD-EPI: >60 ML/MIN/1.73/M2
GLOBULIN SER-MCNC: 2.4 GM/DL (ref 2.4–3.5)
GLUCOSE SERPL-MCNC: 101 MG/DL (ref 82–115)
GLUCOSE SERPL-MCNC: 104 MG/DL (ref 70–110)
GLUCOSE SERPL-MCNC: 87 MG/DL (ref 70–110)
HCT VFR BLD AUTO: 40.9 % (ref 42–52)
HDLC SERPL-MCNC: 53 MG/DL (ref 35–60)
HGB BLD-MCNC: 14 G/DL (ref 14–18)
IMM GRANULOCYTES # BLD AUTO: 0.03 X10(3)/MCL (ref 0–0.04)
IMM GRANULOCYTES NFR BLD AUTO: 0.3 %
LDLC SERPL CALC-MCNC: 146 MG/DL (ref 50–140)
LYMPHOCYTES # BLD AUTO: 1.3 X10(3)/MCL (ref 0.6–4.6)
LYMPHOCYTES NFR BLD AUTO: 11.5 %
MAGNESIUM SERPL-MCNC: 1.8 MG/DL (ref 1.6–2.6)
MCH RBC QN AUTO: 33.7 PG (ref 27–31)
MCHC RBC AUTO-ENTMCNC: 34.2 G/DL (ref 33–36)
MCV RBC AUTO: 98.6 FL (ref 80–94)
MONOCYTES # BLD AUTO: 0.99 X10(3)/MCL (ref 0.1–1.3)
MONOCYTES NFR BLD AUTO: 8.7 %
NEUTROPHILS # BLD AUTO: 8.94 X10(3)/MCL (ref 2.1–9.2)
NEUTROPHILS NFR BLD AUTO: 78.7 %
NRBC BLD AUTO-RTO: 0 %
OHS QRS DURATION: 74 MS
OHS QTC CALCULATION: 481 MS
PHOSPHATE SERPL-MCNC: 1.5 MG/DL (ref 2.3–4.7)
PLATELET # BLD AUTO: 201 X10(3)/MCL (ref 130–400)
PMV BLD AUTO: 11.3 FL (ref 7.4–10.4)
POCT GLUCOSE: 87 MG/DL (ref 70–110)
POCT GLUCOSE: 94 MG/DL (ref 70–110)
POTASSIUM SERPL-SCNC: 3.8 MMOL/L (ref 3.5–5.1)
PROT SERPL-MCNC: 6.2 GM/DL (ref 5.8–7.6)
RBC # BLD AUTO: 4.15 X10(6)/MCL (ref 4.7–6.1)
SODIUM SERPL-SCNC: 143 MMOL/L (ref 136–145)
TRIGL SERPL-MCNC: 70 MG/DL (ref 34–140)
TSH SERPL-ACNC: 1.3 UIU/ML (ref 0.35–4.94)
VLDLC SERPL CALC-MCNC: 14 MG/DL
WBC # BLD AUTO: 11.35 X10(3)/MCL (ref 4.5–11.5)

## 2024-08-27 PROCEDURE — 63600175 PHARM REV CODE 636 W HCPCS: Performed by: PSYCHIATRY & NEUROLOGY

## 2024-08-27 PROCEDURE — 25000003 PHARM REV CODE 250

## 2024-08-27 PROCEDURE — 36415 COLL VENOUS BLD VENIPUNCTURE: CPT

## 2024-08-27 PROCEDURE — 84100 ASSAY OF PHOSPHORUS: CPT | Performed by: NURSE PRACTITIONER

## 2024-08-27 PROCEDURE — 80053 COMPREHEN METABOLIC PANEL: CPT

## 2024-08-27 PROCEDURE — 85025 COMPLETE CBC W/AUTO DIFF WBC: CPT

## 2024-08-27 PROCEDURE — 84443 ASSAY THYROID STIM HORMONE: CPT | Performed by: NURSE PRACTITIONER

## 2024-08-27 PROCEDURE — 97162 PT EVAL MOD COMPLEX 30 MIN: CPT

## 2024-08-27 PROCEDURE — 25000003 PHARM REV CODE 250: Performed by: NURSE PRACTITIONER

## 2024-08-27 PROCEDURE — 93010 ELECTROCARDIOGRAM REPORT: CPT | Mod: ,,, | Performed by: INTERNAL MEDICINE

## 2024-08-27 PROCEDURE — 97166 OT EVAL MOD COMPLEX 45 MIN: CPT

## 2024-08-27 PROCEDURE — 93005 ELECTROCARDIOGRAM TRACING: CPT

## 2024-08-27 PROCEDURE — 63600175 PHARM REV CODE 636 W HCPCS

## 2024-08-27 PROCEDURE — 20000000 HC ICU ROOM

## 2024-08-27 PROCEDURE — 83735 ASSAY OF MAGNESIUM: CPT | Performed by: NURSE PRACTITIONER

## 2024-08-27 PROCEDURE — 99222 1ST HOSP IP/OBS MODERATE 55: CPT | Mod: ,,, | Performed by: PSYCHIATRY & NEUROLOGY

## 2024-08-27 PROCEDURE — 80061 LIPID PANEL: CPT | Performed by: NURSE PRACTITIONER

## 2024-08-27 RX ORDER — ATORVASTATIN CALCIUM 40 MG/1
40 TABLET, FILM COATED ORAL NIGHTLY
Status: DISCONTINUED | OUTPATIENT
Start: 2024-08-27 | End: 2024-09-03 | Stop reason: HOSPADM

## 2024-08-27 RX ORDER — ASPIRIN 300 MG/1
300 SUPPOSITORY RECTAL DAILY
Status: DISCONTINUED | OUTPATIENT
Start: 2024-08-27 | End: 2024-08-29

## 2024-08-27 RX ORDER — MIDAZOLAM HYDROCHLORIDE 2 MG/2ML
2 INJECTION, SOLUTION INTRAMUSCULAR; INTRAVENOUS ONCE
Status: COMPLETED | OUTPATIENT
Start: 2024-08-27 | End: 2024-08-27

## 2024-08-27 RX ORDER — DEXMEDETOMIDINE HYDROCHLORIDE 4 UG/ML
INJECTION, SOLUTION INTRAVENOUS
Status: COMPLETED
Start: 2024-08-27 | End: 2024-08-27

## 2024-08-27 RX ORDER — HYDRALAZINE HYDROCHLORIDE 20 MG/ML
10 INJECTION INTRAMUSCULAR; INTRAVENOUS EVERY 6 HOURS PRN
Status: DISCONTINUED | OUTPATIENT
Start: 2024-08-27 | End: 2024-09-03 | Stop reason: HOSPADM

## 2024-08-27 RX ORDER — DEXMEDETOMIDINE HYDROCHLORIDE 4 UG/ML
0-1.4 INJECTION, SOLUTION INTRAVENOUS CONTINUOUS
Status: DISCONTINUED | OUTPATIENT
Start: 2024-08-27 | End: 2024-08-28

## 2024-08-27 RX ORDER — MIDAZOLAM HYDROCHLORIDE 2 MG/2ML
INJECTION, SOLUTION INTRAMUSCULAR; INTRAVENOUS
Status: COMPLETED
Start: 2024-08-27 | End: 2024-08-27

## 2024-08-27 RX ORDER — LABETALOL HYDROCHLORIDE 5 MG/ML
10 INJECTION, SOLUTION INTRAVENOUS EVERY 6 HOURS PRN
Status: DISCONTINUED | OUTPATIENT
Start: 2024-08-27 | End: 2024-09-03 | Stop reason: HOSPADM

## 2024-08-27 RX ADMIN — LEVETIRACETAM 500 MG: 100 INJECTION, SOLUTION INTRAVENOUS at 08:08

## 2024-08-27 RX ADMIN — DEXMEDETOMIDINE HYDROCHLORIDE 0.1 MCG/KG/HR: 400 INJECTION INTRAVENOUS at 02:08

## 2024-08-27 RX ADMIN — MIDAZOLAM HYDROCHLORIDE 2 MG: 2 INJECTION, SOLUTION INTRAMUSCULAR; INTRAVENOUS at 01:08

## 2024-08-27 RX ADMIN — LEVETIRACETAM 500 MG: 100 INJECTION, SOLUTION INTRAVENOUS at 10:08

## 2024-08-27 RX ADMIN — DIPHENHYDRAMINE HYDROCHLORIDE 25 MG: 50 INJECTION INTRAMUSCULAR; INTRAVENOUS at 12:08

## 2024-08-27 RX ADMIN — FAMOTIDINE 20 MG: 10 INJECTION, SOLUTION INTRAVENOUS at 08:08

## 2024-08-27 RX ADMIN — FAMOTIDINE 20 MG: 10 INJECTION, SOLUTION INTRAVENOUS at 10:08

## 2024-08-27 RX ADMIN — ASPIRIN 300 MG: 300 SUPPOSITORY RECTAL at 05:08

## 2024-08-27 RX ADMIN — SODIUM CHLORIDE: 9 INJECTION, SOLUTION INTRAVENOUS at 04:08

## 2024-08-27 RX ADMIN — MIDAZOLAM HYDROCHLORIDE 2 MG: 1 INJECTION, SOLUTION INTRAMUSCULAR; INTRAVENOUS at 01:08

## 2024-08-27 NOTE — PT/OT/SLP EVAL
Physical Therapy Evaluation    Patient Name:  Bi Whitley Jr.   MRN:  [id number]    Recommendations:     Discharge therapy intensity:  (pending; very difficult to determine today due to lack of appropraite participation but is probable that he will need placement)   Discharge Equipment Recommendations: to be determined by next level of care   Barriers to discharge:  medical dx, impaired mobility, decreased independence     Assessment:     Bi Whitley Jr. is a 84 y.o. male admitted with a medical diagnosis of R weakness, seizure; s/p TNK; L MCA syndrome s/p cerebral angiogram.      He presents with the following impairments/functional limitations: weakness, gait instability, decreased upper extremity function, impaired endurance, decreased lower extremity function, impaired balance, decreased safety awareness, impaired self care skills, impaired functional mobility.    Patient with fair tolerance to PT eval. Upon entry to room, pt just sitting down in bedside chair after being assisted OOB to chair by 4 nursing staff members. Pt appears quite lethargic; kept eyes closed majority of session, but when spoken to does attempt to follow commands and answer verbally, although difficult to understand at times. RN reports pt being impulsive and agitated throughout his stay thus far; aside from a few cues for safety, no major behaviors demonstrated during this session. Pt able to perform sit<>stand with Herminio x2 and perform marches in place. RN remains in room attempting to neuro check pt; overall, session limited. Does appear to have decreased coordination and impaired mobility as compared to baseline. Daughter present in room and provides PLOF since pt unable to at this time. In the coming sessions, will plan to progress mobility as appropriate and at which time will be able to provide a more appropriate discharge recommendation.     Rehab Prognosis: Good; patient would benefit from acute skilled PT services to address these  deficits and reach maximum level of function.    Recent Surgery: * No procedures listed * 1 Day Post-Op    Plan:     During this hospitalization, patient would benefit from acute PT services 6 x/week to address the identified rehab impairments via gait training, therapeutic activities, therapeutic exercises, neuromuscular re-education and progress toward the following goals:    Plan of Care Expires:  09/27/24    Subjective     Chief Complaint: n/a  Patient/Family Comments/goals: to get better  Pain/Comfort:  Pain Rating 1: 0/10    Patients cultural, spiritual, Sikhism conflicts given the current situation: no    Living Environment:  Pt lives with spouse in a 2 story home; does not require access to second level  Daughter reports plan is for pt and spouse to move to senior living/assisted living facility in [place] in October.  Prior to admission, patients level of function was independent.    Equipment used at home: none.  DME owned (not currently used): none; equipment owned is for pt's spouse usage.    Upon discharge, patient will have assistance from unsure.    Objective:     Communicated with NSG prior to session.  Patient found up in chair with blood pressure cuff, pulse ox (continuous), telemetry, peripheral IV, 1:1  upon PT entry to room.    General Precautions: Standard, fall (SBP<180/105)  Orthopedic Precautions:N/A   Braces: N/A  Respiratory Status: Room air  Blood Pressure: 108/68  HR: 110-120 bpm      Exams:  Cognitive Exam:  Patient is oriented to Person, Place, and Time  Coordination: finger to nose- impaired  RLE Strength: 5/5  LLE Strength: 5/5  Skin integrity: Visible skin intact      Functional Mobility:  Transfers:     Sit to Stand:  minimum assistance and of 2 persons with rolling walker  Pre-Gait: pt able to perform static marches with use of RW and Herminio; B decreased foot clearance; no major LOB; kept eyes closed  True ambulation not performed 2/2 conservative treatment recommended by RN d/t  pt lethargic and still being closely neurologically monitored at this time. Will progress as able/as appropriate.       Treatment & Education:  Patient and daughter/s were provided with verbal education  regarding PT role/goals/POC, fall prevention, safety awareness, and discharge/DME recommendations.  Additional teaching is warranted.     Patient left up in chair with all lines intact, call button in reach, RN and 1:1 present, and roll belt donned .    GOALS:   Multidisciplinary Problems       Physical Therapy Goals          Problem: Physical Therapy    Goal Priority Disciplines Outcome Goal Variances Interventions   Physical Therapy Goal     PT, PT/OT Progressing     Description: Goals to be met by: 24     Patient will increase functional independence with mobility by performin. Supine to sit with Stand-by Assistance  2. Sit to supine with Stand-by Assistance  3. Sit to stand transfer with Stand-by Assistance  4. Gait  x 150 feet with Stand-by Assistance using Rolling Walker.                          History:     Past Medical History:   Diagnosis Date    Francois esophagus     Prostate cancer        Past Surgical History:   Procedure Laterality Date    APPENDECTOMY      RADICAL PROSTATECTOMY      TONSILLECTOMY         Time Tracking:     PT Received On: 24  PT Start Time: 1342     PT Stop Time: 1406  PT Total Time (min): 24 min     Billable Minutes: Evaluation mod      2024

## 2024-08-27 NOTE — PLAN OF CARE
08/27/24 1000   Discharge Assessment   Assessment Type Discharge Planning Assessment   Confirmed/corrected address, phone number and insurance Yes   Confirmed Demographics Correct on Facesheet   Source of Information family   Communicated KATERINE with patient/caregiver Date not available/Unable to determine   Reason For Admission Stroke VS Breakthrough Seizure   People in Home spouse   Facility [facility]rived From: [facility]   Do you expect to return to your current living situation? Yes   Do you have help at home or someone to help you manage your care at home? Yes   Who are your caregiver(s) and their phone number(s)? SpouseDaphne [phone]   Prior to hospitilization cognitive status: Unable to Assess   Current cognitive status: Unable to Assess   Walking or Climbing Stairs Difficulty no   Dressing/Bathing Difficulty no   Home Accessibility not wheelchair accessible;stairs to enter home   Number of Stairs, Main Entrance four   Stair Railings, Main Entrance none   Equipment Currently Used at Home none   Patient currently being followed by outpatient case management? No   Do you currently have service(s) that help you manage your care at home? No   Do you take prescription medications? Yes   Do you have prescription coverage? Yes   Coverage [employer] in [place]   Do you have any problems affording any of your prescribed medications? No   Is the patient taking medications as prescribed? yes   Who is going to help you get home at discharge? TBD   How do you get to doctors appointments? car, drives self;family or friend will provide   Are you on dialysis? No   Do you take coumadin? No   Discharge Plan A Rehab   Discharge Plan B Skilled Nursing Facility   DME Needed Upon Discharge  other (see comments)  (TBD)   Discharge Plan discussed with: Spouse/sig other   Name(s) and Number(s) SpouseDaphne [phone]   Transition of Care Barriers None   Physical Activity   On average, how many days per  week do you engage in moderate to strenuous exercise (like a brisk walk)? 5 days   On average, how many minutes do you engage in exercise at this level? 60 min   Financial Resource Strain   How hard is it for you to pay for the very basics like food, housing, medical care, and heating? Not hard   Housing Stability   In the last 12 months, was there a time when you were not able to pay the mortgage or rent on time? N   At any time in the past 12 months, were you homeless or living in a shelter (including now)? N   Transportation Needs   Has the lack of transportation kept you from medical appointments, meetings, work or from getting things needed for daily living? No   Food Insecurity   Within the past 12 months, you worried that your food would run out before you got the money to buy more. Never true   Within the past 12 months, the food you bought just didn't last and you didn't have money to get more. Never true   Stress   Do you feel stress - tense, restless, nervous, or anxious, or unable to sleep at night because your mind is troubled all the time - these days? Pt Unable   Social Isolation   How often do you feel lonely or isolated from those around you?  Patient unable to answer   Alcohol Use   Q1: How often do you have a drink containing alcohol? Never   Q2: How many drinks containing alcohol do you have on a typical day when you are drinking? None   Q3: How often do you have six or more drinks on one occasion? Never   Utilities   In the past 12 months has the electric, gas, oil, or water company threatened to shut off services in your home? No   Health Literacy   How often do you need to have someone help you when you read instructions, pamphlets, or other written material from your doctor or pharmacy? Never   OTHER   Name(s) of People in Home Spouse, Daphne Whitley     Assessment completed in patient's room.  Spouse, Daphne at bedside.  Patient is a retired orthopedic surgeon.  Patient normally drives and is  self sufficient.   PCP-Kevan French  Pharmacy-[facility] in [place]    Patient does not utilize any DME in the home and has not has HH services in the past    Discharge plans TBD at this time.  Will continue to follow.

## 2024-08-27 NOTE — ANESTHESIA PREPROCEDURE EVALUATION
08/27/2024  Bi Whitley Jr. is a 84 y.o., male.      Pre-op Assessment          Review of Systems  Anesthesia Hx:    Limited history except as noted. Patient received under stroke protocol, Was intubated but moving upon arrival. Routine monitors applied , patient sedated and chemically paralyzed for procedure              Social:  Former Smoker       Cardiovascular:  Cardiovascular Normal                                            Hepatic/GI:     GERD             Neurological:  Neurology Normal          Possible sz activity witnessed prior to arrival                                Physical Exam    Airway:  Mallampati: unable to assess   Pre-Existing Airway: Oral Endotracheal tube    Chest/Lungs:  Clear to auscultation    Heart:  Rate: Normal  Rhythm: Regular Rhythm  Sounds: Normal        Anesthesia Plan  Type of Anesthesia, risks & benefits discussed:    Anesthesia Type: Gen ETT  Intra-op Monitoring Plan: Standard ASA Monitors  Post Op Pain Control Plan: multimodal analgesia  ASA Score: 4 Emergent  Day of Surgery Review of History & Physical: H&P Update referred to the surgeon/provider.    Ready For Surgery From Anesthesia Perspective.     .

## 2024-08-27 NOTE — ASSESSMENT & PLAN NOTE
- presented with right sided weakness, aphasia status post TNK at 1130 am on 8/26/2024 at [facility] in [place], [place].   - Stroke RF: former smoker   - Intervention: TNK   - Etiology: TBD    Stroke workup:  -CTh: Negative   8/27/2024 CTh at 24 hours post TNK (2mg versed used for sedation). Impression: No acute intracranial abnormality. Chronic microvascular ischemic changes.  -CTA h/n:  Motion artifact without occlusion, stenosis or dissection   -MRI brain: No acute intracranial findings.   -ECHO: EF 55-60%. Agitated saline study of the atrial septum is negative, suggesting absence of intracardiac shunt at the atrial level.   - CUS: ordered  -LDL: 146  -A1c: 5.1   -TSH: normal    -home medications listed include:  Nexium 40 mg, Pepcid 20 mg, Zegerid, Vyvanse 20 mg,      Plan:  S/p TNK  -permissive HTN for now ... SBP less than 180  - q1hr neuro checks  - STAT CTh for any HA or neuro change  - repeat CTh after 24 hours completed, no acute findings  - ASA 300mg rectal, change to asa 81 mg when cleared to safely swallow.  - begin Atorvastatin 40 mg when cleared to safely swallow  - MRI of brain no acute findings   - no longer indication for Precedex   - psych consult placed yesterday evaluate/treat   - continue PT/OT/ST evaluations

## 2024-08-27 NOTE — ASSESSMENT & PLAN NOTE
- presented with right sided weakness, aphasia status post TNK at 1130 am on 8/26/2024 at [facility] in [place], [place].   - Stroke RF: former smoker   - Intervention: TNK   - Etiology: TBD    Stroke workup:  -CTh: Negative   8/27/2024 CTh at 24 hours post TNK (2mg versed used for sedation). Impression: No acute intracranial abnormality. Chronic microvascular ischemic changes.  -CTA h/n:  Motion artifact without occlusion, stenosis or dissection   -MRI brain: ordered for with anesthesia patient will not follow commands steady moving all extremities  - okay for Precedex    -ECHO: ordered  - CUS: ordered  -LDL: 146  -A1c: 5.1   -TSH: normal    -home medications listed include:  Nexium 40 mg, Pepcid 20 mg, Zegerid, Vyvanse 20 mg,      Plan:  S/p TNK  -permissive HTN for now ... SBP less than 180  -q1hr neuro checks  -STAT CTh for any HA or neuro change  -repeat CTh after 24 hours completed, no acute findings  - ASA 300mg rectal, change to asa 81 mg when cleared to safely swallow.  - begin Atorvastatin 40 mg when cleared to safely swallow  - awaiting MRI with anesthesia   - okay from neurology stand point for Precedex   -PT/OT/ST to evaluate

## 2024-08-27 NOTE — PROGRESS NOTES
[facility] - 7th Floor ICU  Neurology  Progress Note    Patient Name: Bi Whitley Jr.  MRN: [id number]  Admission Date: 8/26/2024  Hospital Length of Stay: 1 days  Code Status: Full Code   Attending Provider: Myke Santamaria MD  Primary Care Physician: Kevan French MD   Principal Problem:<principal problem not specified>    HPI:   Bi Whitley Jr. is a 84 y.o. male with past medical history prostate cancer, hypothyroidism, former cigarette smoker, quit smoking over 40 years ago smoker presents as transfer from [facility] for interventional neurology, Dr. Murphy accepting physician. 8/26/2024 presented to [facility] ED in [place] related to acute onset right sided weakness with aphasia. Last known normal 900 am per vascular documentation. Arrived [facility] ED 1040 am, telestroke 1057am received TNK 1130 am at [facility] ED. Per transfer records CTH negative. CTA motion artifact without occlusion, stenosis, or dissection. Patient was VAN positive at [facility] ED. Upon arrive to [facility] patient went directly to cath lab for cerebral angiogram with Dr. Murphy.      Review of Systems   Unable to perform ROS: Acuity of condition   Constitutional:  Negative for chills and fever.     Physical Exam   Constitutional: He appears well-developed. He is restrained.   Psychiatric: His speech is delayed and slurred. He is agitated and hyperactive. He is attentive.   Nursing note and vitals reviewed.      Assessment and Plan:     Right sided weakness  - presented with right sided weakness, aphasia status post TNK at 1130 am on 8/26/2024 at [facility] in [place], [place].   - Stroke RF: former smoker   - Intervention: TNK   - Etiology: TBD    Stroke workup:  -CTh: Negative   8/27/2024 CTh at 24 hours post TNK (2mg versed used for sedation). Impression: No acute intracranial abnormality. Chronic microvascular ischemic changes.  -CTA h/n:  Motion artifact without  occlusion, stenosis or dissection   -MRI brain: ordered for with anesthesia patient will not follow commands steady moving all extremities  - okay for Precedex    -ECHO: ordered  - CUS: ordered  -LDL: 146  -A1c: 5.1   -TSH: normal    -home medications listed include:  Nexium 40 mg, Pepcid 20 mg, Zegerid, Vyvanse 20 mg,      Plan:  S/p TNK  -permissive HTN for now ... SBP less than 180  -q1hr neuro checks  -STAT CTh for any HA or neuro change  -repeat CTh after 24 hours completed, no acute findings  - ASA 300mg rectal, change to asa 81 mg when cleared to safely swallow.  - begin Atorvastatin 40 mg when cleared to safely swallow  - awaiting MRI with anesthesia   - okay from neurology stand point for Precedex   - psych consult to further evaluate/treat   2:00 pm patient neuro status improved, presently precedex not indicated.  Patient now following commands, no weakness, tracking normal with eye, still falling back to sleep easily, awakens easily when spoken to.   Tachycardia proceed with EKG evaluate for Afib   -continue PT/OT/ST evaluations          VTE Risk Mitigation (From admission, onward)           Ordered     Reason for No Pharmacological VTE Prophylaxis  Once        Question:  Reasons:  Answer:  Risk of Bleeding    08/26/24 1604     IP VTE HIGH RISK PATIENT  Once         08/26/24 1604     Place sequential compression device  Until discontinued         08/26/24 1604                    Kiana Gant NP  Neurology  [facility] - 7th Floor ICU  I have seen/examined the patient.  NP was scribe.  I agree with the findings unless outlined below:    Radhames Melgoza MD  [facility]

## 2024-08-27 NOTE — PLAN OF CARE
Problem: Adult Inpatient Plan of Care  Goal: Plan of Care Review  Reactivated  Goal: Patient-Specific Goal (Individualized)  Reactivated     Problem: Stroke, Ischemic (Includes Transient Ischemic Attack)  Goal: Optimal Cerebral Tissue Perfusion  Reactivated  Goal: Optimal Functional Ability  Reactivated

## 2024-08-27 NOTE — PT/OT/SLP EVAL
Occupational Therapy  Evaluation    Name: Bi Whitley Jr.  MRN: [id number]  Recent Surgery: * No procedures listed * 1 Day Post-Op    Recommendations:     Discharge therapy intensity:  (pending; very difficult to determine today due to lack of appropraite participation but is probable that he will need placement)   Discharge Equipment Recommendations:  to be determined by next level of care  Barriers to discharge:  Other (Comment) (severity of deficits, ongoing medical needs)    Assessment:     Bi Whtiley Jr. is a 84 y.o. male with a medical diagnosis of R weakness, seizure; s/p TNK; L MCA syndrome s/p cerebral angiogram. He tolerated OT evaluation fairly this date. He presents with the following performance deficits affecting function: weakness, impaired endurance, impaired self care skills, impaired functional mobility, gait instability, impaired balance, impaired cognition, decreased upper extremity function, decreased lower extremity function, decreased safety awareness. Family present at bedside and pt up in chair via x4 nursing staff members upon OT entry. Pt presents lethargic and kept eyes closed majority of the session. Pt attempted to answer questions verbally; however, difficult to understand and pt was able to follow some commands. Per RN, pt very impulsive and agitated; no major behaviors demonstrated during evaluation but he did require a few verbal cues for safety awareness. He required min A x2 for sit<>stand using rolling walker and was able to take steps in place. Will progress mobility as able in upcoming sessions and adjust POC accordingly.     Rehab Prognosis: Good; patient would benefit from acute skilled OT services to address these deficits and reach maximum level of function.       Plan:     Patient to be seen 6 x/week to address the above listed problems via self-care/home management, therapeutic activities, therapeutic exercises, neuromuscular re-education  Plan of Care Expires:  09/27/24  Plan of Care Reviewed with: patient, daughter    Subjective     Chief Complaint: n/a  Patient/Family Comments/goals: to return to PLOF    Occupational Profile:  Living Environment: Pt lives with his wife in a two-story home; however, they stay on the first level. Pt has a walk-in shower. Per daughter, pt and spouse were going to move to senior living/assisted living facility in [place] in October.   Previous level of function: Pt was independent with ADLs prior.   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     Pain/Comfort:  Pain Rating 1: 0/10    Patients cultural, spiritual, Restorationism conflicts given the current situation: no    Objective:     OT communicated with RN prior to session.      Patient was found up in chair with blood pressure cuff, pulse ox (continuous), telemetry, peripheral IV (1:1, roll belt, wrist restraints) upon OT entry to room.    General Precautions: Standard, fall (SBP<180/105)  Orthopedic Precautions: N/A  Braces: N/A    Vital Signs: Blood Pressure: 108/68  Respiratory Status: on room air    Bed Mobility:    Pt up in chair upon OT entry.     Functional Mobility/Transfers:  Patient completed Sit <> Stand Transfer with minimum assistance and of x2 persons  with  rolling walker   Functional Mobility: pt able to take steps in place with min A x2 using rolling walker; will progress mobility as able.     Activities of Daily Living:  Lower Body Dressing: maximal assistance to don socks.     AMPAC 6 Click ADL:  AMPAC Total Score: 11    Functional Cognition:  Orientation: oriented to Person, Place, and Time  Safety Awareness: Impaired.      Visual Perceptual Skills:  Difficulty following commands; will continue to assess as able.     Upper Extremity Function:  Right Upper Extremity:   WFL    Left Upper Extremity:  WFL    Balance:   Overall min A    Therapeutic Positioning  Risk  for acquired pressure injuries is increased due to relative decrease in mobility d/t hospitalization  and impaired mobility.    OT interventions performed during the course of today's session:   Therapeutic positioning was provided at the conclusion of session to offload all bony prominences for the prevention and/or reduction of pressure injuries    Skin assessment: all bony prominences were assessed    Findings:  Visible skin intact.     OT recommendations for therapeutic positioning throughout hospitalization:   Follow [facility] Pressure Injury Prevention Protocol    Patient Education:  Patient and family were provided with verbal education education regarding OT role/goals/POC, fall prevention, safety awareness, Discharge/DME recommendations, and pressure ulcer prevention.  Understanding was verbalized, however additional teaching warranted.     Patient left up in chair with all lines intact, call button in reach, RN notified, and family and 1:1 present.    GOALS:   Multidisciplinary Problems       Occupational Therapy Goals          Problem: Occupational Therapy    Goal Priority Disciplines Outcome Interventions   Occupational Therapy Goal     OT, PT/OT Progressing    Description: LTG: Pt will perform basic ADLs and ADL transfers with Modified independence using LRAD by discharge.    STG: to be met by 9/27/24    Pt will complete grooming standing at sink with LRAD with SBA.  Pt will complete UB dressing with SBA.  Pt will complete LB dressing with SBA using LRAD.  Pt will complete toileting with SBA using LRAD.  Pt will complete functional mobility to/from toilet and toilet transfer with SBA using LRAD.                        History:     Past Medical History:   Diagnosis Date    Francois esophagus     Prostate cancer          Past Surgical History:   Procedure Laterality Date    APPENDECTOMY      RADICAL PROSTATECTOMY      TONSILLECTOMY         Time Tracking:     OT Date of Treatment: 08/27/24  OT Start Time:  1341  OT Stop Time: 1405  OT Total Time (min): 24 min    Billable Minutes:Evaluation Moderate Complexity.     8/27/2024

## 2024-08-27 NOTE — SUBJECTIVE & OBJECTIVE
"  Subjective:     Interval History: patient continues to move UE and LE spontaneous in effort to get out of bed. Remains with dysarthria. Mumbles when request to tell his name. Speaks random words, such as "up" when attempting to get out of bed.  Did not sleep last night despite haldol. CT head at 24 hours post TNK, no hemorrhage, no acute findings.     Current Facility-Administered Medications   Medication Dose Route Frequency Provider Last Rate Last Admin    0.9%  NaCl infusion   Intravenous Continuous Andi Murphy MD   Paused at 08/26/24 2033    0.9%  NaCl infusion   Intravenous Continuous Kiana Gant,  mL/hr at 08/27/24 1300 Rate Verify at 08/27/24 1300    bisacodyL suppository 10 mg  10 mg Rectal Daily PRN Kiana Gant, NP        famotidine (PF) injection 20 mg  20 mg Intravenous BID Broderick Hernandez MD   20 mg at 08/27/24 1010    hydrALAZINE injection 10 mg  10 mg Intravenous Q6H PRN Broderick Hernandez MD        labetaloL injection 10 mg  10 mg Intravenous Q6H PRN Broderick Hernandez MD        levETIRAcetam (Keppra) 500 mg in D5W 100 mL IVPB (MB+)  500 mg Intravenous Q12H Broderick Hernandez MD   Stopped at 08/27/24 1039    [START ON 8/28/2024] mupirocin 2 % ointment   Nasal BID Ap Jeong MD        ondansetron injection 4 mg  4 mg Intravenous Q8H PRN Kiana Gant, NP        sodium chloride 0.9% flush 10 mL  10 mL Intravenous PRN Andi Murphy MD           Review of Systems   Unable to perform ROS: Acuity of condition     Objective:     Vital Signs (Most Recent):  Temp: 98.2 °F (36.8 °C) (08/27/24 1200)  Pulse: 96 (08/27/24 1245)  Resp: (!) 24 (08/27/24 1245)  BP: (!) 171/79 (08/27/24 1245)  SpO2: 96 % (08/27/24 1245) Vital Signs (24h Range):  Temp:  [97.2 °F (36.2 °C)-98.4 °F (36.9 °C)] 98.2 °F (36.8 °C)  Pulse:  [] 96  Resp:  [12-36] 24  SpO2:  [90 %-100 %] 96 %  BP: (113-198)/() 171/79        There is no height or weight on file to calculate BMI.   " "  Physical Exam  Vitals and nursing note reviewed.   Constitutional:       Appearance: Normal appearance. He is not toxic-appearing.      Interventions: He is restrained.   HENT:      Head: Atraumatic.   Eyes:      General: Lids are normal. Gaze aligned appropriately.   Cardiovascular:      Rate and Rhythm: Normal rate and regular rhythm.   Pulmonary:      Effort: Pulmonary effort is normal.          NEUROLOGICAL EXAMINATION:     MENTAL STATUS        Patient will follow some commands, he will turn and look with request. Will say words such as "get up" "want to get out of here" moving UE and LE spontaneously, not following commands consistently. Steady trying to put legs over railings and moving arms to get out of bed.        Significant Labs:   Recent Lab Results  (Last 5 results in the past 24 hours)        08/27/24  0535   08/27/24  0229   08/27/24  0136   08/27/24  0100   08/26/24 2022        Albumin/Globulin Ratio   1.6             Albumin   3.8             ALP   43             ALT   18             Anion Gap   12.0             AST   35             Baso #   0.06             Basophil %   0.5             BILIRUBIN TOTAL   1.0             BUN   11.9             BUN/CREAT RATIO   14             Calcium   9.0             Chloride   111             Cholesterol Total   213             CO2   20             Creatinine   0.88             eGFR   >60             Eos #   0.03             Eos %   0.3             Estimated Avg Glucose         99.7       Globulin, Total   2.4             Glucose   101             HDL   53             Hematocrit   40.9             Hemoglobin   14.0             Hemoglobin A1C External         5.1       Immature Grans (Abs)   0.03             Immature Granulocytes   0.3             LDL Cholesterol   146.00             Lymph #   1.30             LYMPH %   11.5             Magnesium    1.80             MCH   33.7             MCHC   34.2             MCV   98.6             Mono #   0.99             " Mono %   8.7             MPV   11.3             Neut #   8.94             Neut %   78.7             nRBC   0.0             Phosphorus Level   1.5             Platelet Count   201             POC Glucose 104       87         POCT Glucose     87           Potassium   3.8             PROTEIN TOTAL   6.2             RBC   4.15             RDW   12.4             Sodium   143             Total Cholesterol/HDL Ratio   4             Triglycerides   70             TSH   1.300             Very Low Density Lipoprotein   14             WBC   11.35                                    Significant Imaging: I have reviewed all pertinent imaging results/findings within the past 24 hours.

## 2024-08-27 NOTE — PROGRESS NOTES
[facility] - 7th Floor ICU  Pulmonary Critical Care Note    Patient Name: Bi Whitley Jr.  MRN: [id number]  Admission Date: 8/26/2024  Hospital Length of Stay: 1 days  Code Status: Full Code  Attending Provider: Myke Santamaria MD  Primary Care Provider: Kevan French MD     Subjective:     HPI:   Patient is a 84 year old male w/ PMHx of Prostate cancer, History of Smoking who presents w/ progressive right sided weakness and new onset seizures. His LKN was around 0900 while he was at the gym and started to note subtle RUE weakness and slurred speech. Symptoms gradually worsened and patient presented to outside facility. Patient had seizure like activity w/ rhythmic jerking of his RUE and facial twitching upon examination of telemedicine vascular neurology. Patient received 6 mg IV Ativan, Keppra, and TNK (anywhere between 1100 - 1200).  He was subsequently intubated. CT head and neck showed no acute intracranial abnormalities  specifically no early signs of ischemia and no intracranial hemorrhage. No LVO, no hemodynamically significant stenosis but limited due to motion artifact.   Patient transferred straight to cath lab. Cerebral angiogram revealed no evidence of LVO or flow limiting stenosis. Patient extubated after procedure.  Upon my encounter, patient mildly agitated and non verbal, on 1.5 L O2 via NC and SpO2 > 96%, VSS. Moving all extremities spontaneously and protecting his airway. Critical care medicine consulted for admission to ICU for q1h neuro checks.     Hospital Course/Significant events:  8/26/2024: Admit to ICU    24 Hour Interval History:  Patient was given haldol and valproate over night for agitation. NO seizure-like activity noted. Patient currently on restraints, sleeping comfortably in bed with spontaneous movement of all extremities.     Past Medical History:   Diagnosis Date    Francois esophagus     Prostate cancer        Past Surgical History:   Procedure  Laterality Date    APPENDECTOMY      RADICAL PROSTATECTOMY      TONSILLECTOMY         Social History     Socioeconomic History    Marital status:    Tobacco Use    Smoking status: Former     Social Determinants of Health     Financial Resource Strain: Low Risk  (8/27/2024)    Overall Financial Resource Strain (CARDIA)     Difficulty of Paying Living Expenses: Not hard at all   Food Insecurity: No Food Insecurity (8/27/2024)    Hunger Vital Sign     Worried About Running Out of Food in the Last Year: Never true     Ran Out of Food in the Last Year: Never true   Transportation Needs: No Transportation Needs (8/27/2024)    TRANSPORTATION NEEDS     Transportation : No   Physical Activity: Sufficiently Active (8/27/2024)    Exercise Vital Sign     Days of Exercise per Week: 5 days     Minutes of Exercise per Session: 60 min   Stress: Patient Unable To Answer (8/27/2024)    [employer] [place] [employer] - Occupational Stress Questionnaire     Feeling of Stress : Patient unable to answer   Housing Stability: Low Risk  (8/27/2024)    Housing Stability Vital Sign     Unable to Pay for Housing in the Last Year: No     Homeless in the Last Year: No           Current Outpatient Medications   Medication Instructions    esomeprazole (NEXIUM) 40 mg, Oral, 2 times daily before meals    famotidine (PEPCID) 20 mg, Oral, 2 times daily    lisdexamfetamine (VYVANSE) 20 mg, Oral, Every morning    omeprazole-sodium bicarbonate (ZEGERID) 40-1.1 mg-gram per capsule 1 capsule, Oral, Before breakfast       Current Inpatient Medications   famotidine (PF)  20 mg Intravenous BID    levETIRAcetam (Keppra) IV (PEDS and ADULTS)  500 mg Intravenous Q12H    [START ON 8/28/2024] mupirocin   Nasal BID       Current Intravenous Infusions   0.9% NaCl   Intravenous Continuous   Paused at 08/26/24 2033    0.9% NaCl   Intravenous Continuous 100 mL/hr at 08/27/24 0900 Rate Verify at 08/27/24 0900         Review of Systems   Unable to  perform ROS: Acuity of condition          Objective:       Intake/Output Summary (Last 24 hours) at 8/27/2024 1034  Last data filed at 8/27/2024 0900  Gross per 24 hour   Intake 2880.7 ml   Output 2600 ml   Net 280.7 ml         Vital Signs (Most Recent):  Temp: 97.9 °F (36.6 °C) (08/27/24 0730)  Pulse: 94 (08/27/24 0945)  Resp: (!) 27 (08/27/24 0945)  BP: (!) 160/97 (08/27/24 0945)  SpO2: 96 % (08/27/24 0945)  There is no height or weight on file to calculate BMI.    Vital Signs (24h Range):  Temp:  [97.2 °F (36.2 °C)-98.4 °F (36.9 °C)] 97.9 °F (36.6 °C)  Pulse:  [] 94  Resp:  [12-36] 27  SpO2:  [90 %-100 %] 96 %  BP: (113-176)/() 160/97     Physical Exam  Vitals and nursing note reviewed.   Constitutional:       Appearance: Normal appearance.   HENT:      Head: Normocephalic and atraumatic.      Mouth/Throat:      Mouth: Mucous membranes are moist.   Eyes:      Pupils: Pupils are equal, round, and reactive to light.   Cardiovascular:      Rate and Rhythm: Normal rate and regular rhythm.      Pulses: Normal pulses.      Heart sounds: No murmur heard.  Pulmonary:      Effort: No respiratory distress.      Breath sounds: No wheezing.   Abdominal:      General: Bowel sounds are normal.      Palpations: Abdomen is soft.      Tenderness: There is no abdominal tenderness.   Musculoskeletal:      Right lower leg: No edema.      Left lower leg: No edema.   Skin:     General: Skin is warm.      Capillary Refill: Capillary refill takes less than 2 seconds.   Neurological:      Comments: Non verbal and not following commands likely coming out of sedation  Spontaneously moves all 4 extremities  Pupillary and corneal reflex present             Lines/Drains/Airways       Drain  Duration             Male External Urinary Catheter 08/26/24 1530 Other (Comment) <1 day              Peripheral Intravenous Line  Duration                  Peripheral IV - Single Lumen 08/26/24 1545 20 G 1 1/4 in Anterior;Distal;Left Forearm  "<1 day         Peripheral IV - Single Lumen 08/26/24 2215 22 G Anterior;Left Shoulder <1 day         Peripheral IV - Single Lumen 08/26/24 2230 20 G Anterior;Right Upper Arm <1 day                    Significant Labs:    Lab Results   Component Value Date    WBC 11.35 08/27/2024    HGB 14.0 08/27/2024    HCT 40.9 (L) 08/27/2024    MCV 98.6 (H) 08/27/2024     08/27/2024           BMP  Lab Results   Component Value Date     08/27/2024    K 3.8 08/27/2024    CO2 20 (L) 08/27/2024    BUN 11.9 08/27/2024    CREATININE 0.88 08/27/2024    CALCIUM 9.0 08/27/2024    AGAP 12.0 08/27/2024         ABG  No results for input(s): "PH", "PO2", "PCO2", "HCO3", "POCBASEDEF" in the last 168 hours.    Mechanical Ventilation Support:         Significant Imaging:  I have reviewed the pertinent imaging within the past 24 hours.        Assessment/Plan:     Assessment  Stroke vs small vessel lacunar stroke vs breakthrough seizure  S/p TNK on 8/26/2024 around (11 to 12 pm)  S/p cerebral angiogram with no LVO or flow limiting stenosis  Hyperlipidemia  ; Total 213    Plan  Admit to ICU for q1h neuro checks  SBP goal of < 180/105  HOB flat s/p TNK  Hold all antiplatelet and anticoagulants  Continue IV Keppra 500 mg BID  Neurology consulted  TTE with bubble completed  CT head without contrast 24 hours s/p TNK at 11:30 am  MRI brain wo contrast ordered, will attempt today if patient improved agitation  PT/OT/ SLP eval and treat  Statin therapy upon discharge    DVT Prophylaxis: SCDs  GI Prophylaxis: Famotidine     32 minutes of critical care was time spent personally by me on the following activities: development of treatment plan with patient or surrogate and bedside caregivers, discussions with consultants, evaluation of patient's response to treatment, examination of patient, ordering and performing treatments and interventions, ordering and review of laboratory studies, ordering and review of radiographic studies, pulse " oximetry, re-evaluation of patient's condition.  This critical care time did not overlap with that of any other provider or involve time for any procedures.     Sedrick Shah MD  Pulmonary Critical Care Medicine  [facility] - 7th Floor ICU  DOS: 08/27/2024

## 2024-08-27 NOTE — PLAN OF CARE
Problem: Occupational Therapy  Goal: Occupational Therapy Goal  Description: LTG: Pt will perform basic ADLs and ADL transfers with Modified independence using LRAD by discharge.    STG: to be met by 9/27/24    Pt will complete grooming standing at sink with LRAD with SBA.  Pt will complete UB dressing with SBA.  Pt will complete LB dressing with SBA using LRAD.  Pt will complete toileting with SBA using LRAD.  Pt will complete functional mobility to/from toilet and toilet transfer with SBA using LRAD.   Outcome: Progressing

## 2024-08-28 LAB
ALBUMIN SERPL-MCNC: 3.3 G/DL (ref 3.4–4.8)
ALBUMIN/GLOB SERPL: 1.3 RATIO (ref 1.1–2)
ALP SERPL-CCNC: 37 UNIT/L (ref 40–150)
ALT SERPL-CCNC: 46 UNIT/L (ref 0–55)
ANION GAP SERPL CALC-SCNC: 9 MEQ/L
APICAL FOUR CHAMBER EJECTION FRACTION: 59 %
APICAL TWO CHAMBER EJECTION FRACTION: 52 %
AST SERPL-CCNC: 154 UNIT/L (ref 5–34)
AV INDEX (PROSTH): 0.64
AV MEAN GRADIENT: 4 MMHG
AV PEAK GRADIENT: 7 MMHG
AV VALVE AREA BY VELOCITY RATIO: 1.74 CM²
AV VALVE AREA: 1.81 CM²
AV VELOCITY RATIO: 0.61
BASOPHILS # BLD AUTO: 0.05 X10(3)/MCL
BASOPHILS NFR BLD AUTO: 0.7 %
BILIRUB SERPL-MCNC: 1 MG/DL
BSA FOR ECHO PROCEDURE: 1.96 M2
BUN SERPL-MCNC: 9.5 MG/DL (ref 8.4–25.7)
CALCIUM SERPL-MCNC: 9.2 MG/DL (ref 8.8–10)
CHLORIDE SERPL-SCNC: 114 MMOL/L (ref 98–107)
CO2 SERPL-SCNC: 21 MMOL/L (ref 23–31)
CREAT SERPL-MCNC: 0.8 MG/DL (ref 0.73–1.18)
CREAT/UREA NIT SERPL: 12
CV ECHO LV RWT: 0.38 CM
DOP CALC AO PEAK VEL: 1.32 M/S
DOP CALC AO VTI: 31.3 CM
DOP CALC LVOT AREA: 2.8 CM2
DOP CALC LVOT DIAMETER: 1.9 CM
DOP CALC LVOT PEAK VEL: 0.81 M/S
DOP CALC LVOT STROKE VOLUME: 56.68 CM3
DOP CALC MV VTI: 25.9 CM
DOP CALCLVOT PEAK VEL VTI: 20 CM
E WAVE DECELERATION TIME: 288 MSEC
E/A RATIO: 0.73
ECHO LV POSTERIOR WALL: 1 CM (ref 0.6–1.1)
EOSINOPHIL # BLD AUTO: 0.11 X10(3)/MCL (ref 0–0.9)
EOSINOPHIL NFR BLD AUTO: 1.6 %
ERYTHROCYTE [DISTWIDTH] IN BLOOD BY AUTOMATED COUNT: 12.4 % (ref 11.5–17)
FRACTIONAL SHORTENING: 38 % (ref 28–44)
GFR SERPLBLD CREATININE-BSD FMLA CKD-EPI: >60 ML/MIN/1.73/M2
GLOBULIN SER-MCNC: 2.5 GM/DL (ref 2.4–3.5)
GLUCOSE SERPL-MCNC: 93 MG/DL (ref 82–115)
HCT VFR BLD AUTO: 37.5 % (ref 42–52)
HGB BLD-MCNC: 13.3 G/DL (ref 14–18)
HR MV ECHO: 54 BPM
IMM GRANULOCYTES # BLD AUTO: 0.02 X10(3)/MCL (ref 0–0.04)
IMM GRANULOCYTES NFR BLD AUTO: 0.3 %
INTERVENTRICULAR SEPTUM: 1 CM (ref 0.6–1.1)
LEFT ATRIUM AREA SYSTOLIC (APICAL 2 CHAMBER): 16.3 CM2
LEFT ATRIUM AREA SYSTOLIC (APICAL 4 CHAMBER): 17 CM2
LEFT ATRIUM SIZE: 4.3 CM
LEFT ATRIUM VOLUME INDEX MOD: 22.3 ML/M2
LEFT ATRIUM VOLUME MOD: 43 CM3
LEFT INTERNAL DIMENSION IN SYSTOLE: 3.3 CM (ref 2.1–4)
LEFT VENTRICLE DIASTOLIC VOLUME INDEX: 69.95 ML/M2
LEFT VENTRICLE DIASTOLIC VOLUME: 135 ML
LEFT VENTRICLE END DIASTOLIC VOLUME APICAL 2 CHAMBER: 69.7 ML
LEFT VENTRICLE END DIASTOLIC VOLUME APICAL 4 CHAMBER: 77 ML
LEFT VENTRICLE END SYSTOLIC VOLUME APICAL 2 CHAMBER: 41.6 ML
LEFT VENTRICLE END SYSTOLIC VOLUME APICAL 4 CHAMBER: 38.4 ML
LEFT VENTRICLE MASS INDEX: 104 G/M2
LEFT VENTRICLE SYSTOLIC VOLUME INDEX: 22.8 ML/M2
LEFT VENTRICLE SYSTOLIC VOLUME: 44.1 ML
LEFT VENTRICULAR INTERNAL DIMENSION IN DIASTOLE: 5.3 CM (ref 3.5–6)
LEFT VENTRICULAR MASS: 200.4 G
LVED V (TEICH): 135 ML
LVES V (TEICH): 44.1 ML
LVOT MG: 1 MMHG
LVOT MV: 0.5 CM/S
LYMPHOCYTES # BLD AUTO: 0.9 X10(3)/MCL (ref 0.6–4.6)
LYMPHOCYTES NFR BLD AUTO: 12.9 %
MAGNESIUM SERPL-MCNC: 2 MG/DL (ref 1.6–2.6)
MCH RBC QN AUTO: 34.3 PG (ref 27–31)
MCHC RBC AUTO-ENTMCNC: 35.5 G/DL (ref 33–36)
MCV RBC AUTO: 96.6 FL (ref 80–94)
MONOCYTES # BLD AUTO: 0.82 X10(3)/MCL (ref 0.1–1.3)
MONOCYTES NFR BLD AUTO: 11.8 %
MV MEAN GRADIENT: 1 MMHG
MV PEAK A VEL: 0.78 M/S
MV PEAK E VEL: 0.57 M/S
MV PEAK GRADIENT: 3 MMHG
MV STENOSIS PRESSURE HALF TIME: 95 MS
MV VALVE AREA BY CONTINUITY EQUATION: 2.19 CM2
MV VALVE AREA P 1/2 METHOD: 2.32 CM2
NEUTROPHILS # BLD AUTO: 5.07 X10(3)/MCL (ref 2.1–9.2)
NEUTROPHILS NFR BLD AUTO: 72.7 %
NRBC BLD AUTO-RTO: 0 %
OHS LV EJECTION FRACTION SIMPSONS BIPLANE MOD: 57 %
PHOSPHATE SERPL-MCNC: 2.2 MG/DL (ref 2.3–4.7)
PISA TR MAX VEL: 2.2 M/S
PLATELET # BLD AUTO: 164 X10(3)/MCL (ref 130–400)
PMV BLD AUTO: 11.7 FL (ref 7.4–10.4)
POCT GLUCOSE: 88 MG/DL (ref 70–110)
POCT GLUCOSE: 96 MG/DL (ref 70–110)
POTASSIUM SERPL-SCNC: 3.8 MMOL/L (ref 3.5–5.1)
PROT SERPL-MCNC: 5.8 GM/DL (ref 5.8–7.6)
RA PRESSURE ESTIMATED: 3 MMHG
RBC # BLD AUTO: 3.88 X10(6)/MCL (ref 4.7–6.1)
RV TB RVSP: 5 MMHG
SINUS: 3.6 CM
SODIUM SERPL-SCNC: 144 MMOL/L (ref 136–145)
TR MAX PG: 19 MMHG
TRICUSPID ANNULAR PLANE SYSTOLIC EXCURSION: 2.33 CM
TV REST PULMONARY ARTERY PRESSURE: 22 MMHG
WBC # BLD AUTO: 6.97 X10(3)/MCL (ref 4.5–11.5)
Z-SCORE OF LEFT VENTRICULAR DIMENSION IN END DIASTOLE: -0.3
Z-SCORE OF LEFT VENTRICULAR DIMENSION IN END SYSTOLE: -0.15

## 2024-08-28 PROCEDURE — 36415 COLL VENOUS BLD VENIPUNCTURE: CPT

## 2024-08-28 PROCEDURE — 20000000 HC ICU ROOM

## 2024-08-28 PROCEDURE — 25000003 PHARM REV CODE 250

## 2024-08-28 PROCEDURE — 25000003 PHARM REV CODE 250: Performed by: PSYCHIATRY & NEUROLOGY

## 2024-08-28 PROCEDURE — 63600175 PHARM REV CODE 636 W HCPCS

## 2024-08-28 PROCEDURE — 80053 COMPREHEN METABOLIC PANEL: CPT

## 2024-08-28 PROCEDURE — 84100 ASSAY OF PHOSPHORUS: CPT | Performed by: NURSE PRACTITIONER

## 2024-08-28 PROCEDURE — 92610 EVALUATE SWALLOWING FUNCTION: CPT

## 2024-08-28 PROCEDURE — 83735 ASSAY OF MAGNESIUM: CPT | Performed by: NURSE PRACTITIONER

## 2024-08-28 PROCEDURE — 25000003 PHARM REV CODE 250: Performed by: ANESTHESIOLOGY

## 2024-08-28 PROCEDURE — 85025 COMPLETE CBC W/AUTO DIFF WBC: CPT

## 2024-08-28 PROCEDURE — 99232 SBSQ HOSP IP/OBS MODERATE 35: CPT | Mod: ,,, | Performed by: PSYCHIATRY & NEUROLOGY

## 2024-08-28 PROCEDURE — 97116 GAIT TRAINING THERAPY: CPT

## 2024-08-28 PROCEDURE — 25000003 PHARM REV CODE 250: Performed by: NURSE PRACTITIONER

## 2024-08-28 RX ORDER — MIRTAZAPINE 15 MG/1
15 TABLET, FILM COATED ORAL NIGHTLY
Status: DISCONTINUED | OUTPATIENT
Start: 2024-08-28 | End: 2024-09-03 | Stop reason: HOSPADM

## 2024-08-28 RX ADMIN — LEVETIRACETAM 500 MG: 100 INJECTION, SOLUTION INTRAVENOUS at 09:08

## 2024-08-28 RX ADMIN — ASPIRIN 300 MG: 300 SUPPOSITORY RECTAL at 09:08

## 2024-08-28 RX ADMIN — FAMOTIDINE 20 MG: 10 INJECTION, SOLUTION INTRAVENOUS at 08:08

## 2024-08-28 RX ADMIN — MUPIROCIN: 20 OINTMENT TOPICAL at 09:08

## 2024-08-28 RX ADMIN — MUPIROCIN: 20 OINTMENT TOPICAL at 08:08

## 2024-08-28 RX ADMIN — FAMOTIDINE 20 MG: 10 INJECTION, SOLUTION INTRAVENOUS at 09:08

## 2024-08-28 RX ADMIN — LEVETIRACETAM 500 MG: 100 INJECTION, SOLUTION INTRAVENOUS at 08:08

## 2024-08-28 RX ADMIN — DEXMEDETOMIDINE HYDROCHLORIDE 0.4 MCG/KG/HR: 400 INJECTION INTRAVENOUS at 05:08

## 2024-08-28 NOTE — NURSING
Nurses Note -- 4 Eyes      8/28/2024   5:12 PM      Skin assessed during: Q Shift Change      [x] No Altered Skin Integrity Present    [x]Prevention Measures Documented      [] Yes- Altered Skin Integrity Present or Discovered   [] LDA Added if Not in Epic (Describe Wound)   [] New Altered Skin Integrity was Present on Admit and Documented in LDA   [] Wound Image Taken    Wound Care Consulted? No    Attending Nurse:  Og Hernandez Rn    Second RN/Staff Member:  Marcie Darling

## 2024-08-28 NOTE — PT/OT/SLP EVAL
[facility]  Speech Language Pathology Department  Clinical Swallow Evaluation    Patient Name:  Bi Whitley Jr.   MRN:  [id number]    Recommendations     General recommendations:  Modified Barium Swallow Study  Solid texture recommendation:  NPO  Liquid consistency recommendation: NPO   Medications: NPO  Precautions: aspiration    History     Bi Whitley Jr. is a/n 84 y.o. male admitted to ICU s/p cerebral angiogram after being transferred from an outside facility s/p IV thrombolytics for interventional neurology services after presenting with slurred speech, RUE weakness.    Past Medical History:   Diagnosis Date    Francois esophagus     Prostate cancer      Past Surgical History:   Procedure Laterality Date    APPENDECTOMY      RADICAL PROSTATECTOMY      TONSILLECTOMY       Home diet texture/consistency: Regular and thin liquids  Current method of nutrition: NPO    Imaging   Results for orders placed during the hospital encounter of 08/26/24    X-Ray Chest 1 View    Narrative  EXAMINATION:  XR CHEST 1 VIEW    CLINICAL HISTORY:  rhonchi;    TECHNIQUE:  Single frontal view of the chest was performed.    COMPARISON:  None    FINDINGS:  No infiltrates are seen.  There faint increased markings in the left lung base most consistent with atelectasis.  Heart size is within normal limits.  There is vascular calcification noted.    Impression  Faint increased markings in the left lung base most consistent with atelectasis.      Electronically signed by: Clif Christensen MD  Date:    08/26/2024  Time:    17:20    MRI Brain Without Contrast    Narrative  EXAMINATION:  MRI BRAIN WITHOUT CONTRAST    CLINICAL HISTORY:  Stroke, follow up;    TECHNIQUE:  Routine unenhanced brain MRI.    COMPARISON:  CT yesterday.    FINDINGS:  Diffusion imaging is negative for acute infarct.  There is moderate patchy increased T2 and FLAIR signal in the cerebral white matter which is nonspecific but most commonly  associated with chronic small vessel ischemic changes. There is moderate global atrophy.  Ventricles are not significantly enlarged    Signal voids are visible in the intracranial internal carotid arteries bilaterally and in the basilar artery implying gross patency.  Mild paranasal sinus inflammation    Impression  No acute intracranial findings.      Electronically signed by: Mane Saeed  Date:    08/28/2024  Time:    11:05    Subjective     Patient awake, alert, and cooperative.    Patient goals: to eat/drink   Spiritual/Cultural/Sabianism Beliefs/Practices that affect care: no    Pain/Comfort: Pain Rating 1: 0/10    Respiratory Status:  room air    Restraints/positioning devices: none    Objective     ORAL MUSCULATURE  Dentition: own teeth  Secretion Management: coughing on secretions (oral hygiene completed)  Mucosal Quality: dry  Facial Movement: general weakness  Buccal Strength & Mobility: WFL  Mandibular Strength & Mobility: WFL  Oral Labial Strength & Mobility: WFL  Lingual Strength & Mobility: WFL  Vocal Quality: adequate  Volitional Cough: Nonproductive    PO TRIALS  Consistency Fed By Oral Symptoms Pharyngeal Symptoms   Ice chips SLP Decreased lip closure  Prolonged bolus formation/mastication  Slowed oral transit time Multiple swallows  Wet vocal quality after swallow   Thin liquid by straw SLP Decreased lip closure  Weak sucking Multiple swallows  Wet vocal quality after swallow  Throat clear after swallow   Puree SLP Reduced closure around utensil/straw  Slowed oral transit time Multiple swallows  Cough after swallow     Assessment     Pt presents with signs/sx oropharyngeal dysphagia warranting an instrumental assessment of swallow function to determine safety of PO intake and develop appropriate treatment plan.    Education     Patient and family were provided with verbal education regarding risks of aspiration and SLP POC.  Understanding was verbalized.    Plan     SLP Follow-Up:  Yes   Plan of  Care reviewed with:  patient, spouse, grandchild(judi)     Time Tracking     SLP Treatment Date:   08/28/24  Speech Start Time:  1500  Speech Stop Time:  1515     Speech Total Time (min):  15 min    Billable minutes:  Swallow and Oral Function Evaluation, 15 minutes     08/28/2024

## 2024-08-28 NOTE — PROGRESS NOTES
S  Pt is doing better  Psych saw him; added remeron  Speech is better  Scheduled Meds:   aspirin  300 mg Rectal Daily    atorvastatin  40 mg Oral QHS    famotidine (PF)  20 mg Intravenous BID    levETIRAcetam (Keppra) IV (PEDS and ADULTS)  500 mg Intravenous Q12H    mirtazapine  15 mg Oral QHS    mupirocin   Nasal BID     Continuous Infusions:   0.9% NaCl   Intravenous Continuous   Paused at 08/26/24 2033    0.9% NaCl   Intravenous Continuous 100 mL/hr at 08/28/24 0628 Rate Verify at 08/28/24 0628    dexmedeTOMIDine (Precedex) infusion (titrating)  0-1.4 mcg/kg/hr Intravenous Continuous 8.1 mL/hr at 08/28/24 0628 0.4 mcg/kg/hr at 08/28/24 0628     PRN Meds:.  Current Facility-Administered Medications:     bisacodyL, 10 mg, Rectal, Daily PRN    hydrALAZINE, 10 mg, Intravenous, Q6H PRN    labetalol, 10 mg, Intravenous, Q6H PRN    ondansetron, 4 mg, Intravenous, Q8H PRN    sodium chloride 0.9%, 10 mL, Intravenous, PRN  Vitals:    08/28/24 0800   BP:    Pulse:    Resp:    Temp: 97.5 °F (36.4 °C)     Exam  Awake, more cooperative  Not aphasic  Moving all extremities    Mri no stroke visualized    Imp  Stroke s/p tnk  No intervention needed  Delirium, btter  Rec ASA  Will pass to oncoming neuorlogist

## 2024-08-28 NOTE — SUBJECTIVE & OBJECTIVE
Subjective:     Interval History: Much improved resting staying calm without Precedex, strength is normal, no facial drop. Dry mouth affecting ability to articulate.   MRI brain completed no acute intracranial findings.     Current Facility-Administered Medications   Medication Dose Route Frequency Provider Last Rate Last Admin    0.9%  NaCl infusion   Intravenous Continuous Andi Murphy MD   Paused at 08/26/24 2033    0.9%  NaCl infusion   Intravenous Continuous Kiana Gant,  mL/hr at 08/28/24 0628 Rate Verify at 08/28/24 0628    aspirin suppository 300 mg  300 mg Rectal Daily Kiana Gant, NP   300 mg at 08/28/24 0920    atorvastatin tablet 40 mg  40 mg Oral QHS Kiana Gant, NP        bisacodyL suppository 10 mg  10 mg Rectal Daily PRN Kiana Gant, NP        dexmedetomidine (PRECEDEX) 400mcg/100mL 0.9% NaCL infusion  0-1.4 mcg/kg/hr Intravenous Continuous Radhames Melgoza MD 8.1 mL/hr at 08/28/24 0628 0.4 mcg/kg/hr at 08/28/24 0628    famotidine (PF) injection 20 mg  20 mg Intravenous BID Broderick Hernandez MD   20 mg at 08/28/24 0920    hydrALAZINE injection 10 mg  10 mg Intravenous Q6H PRN Sedrick Shah MD        labetaloL injection 10 mg  10 mg Intravenous Q6H PRN Sedrick Shah MD        levETIRAcetam (Keppra) 500 mg in D5W 100 mL IVPB (MB+)  500 mg Intravenous Q12H Broderick Hernandez MD   Stopped at 08/28/24 0951    mirtazapine tablet 15 mg  15 mg Oral QHS Thad Magallon NP        mupirocin 2 % ointment   Nasal BID Ap Jeong MD   Given at 08/28/24 0920    ondansetron injection 4 mg  4 mg Intravenous Q8H PRN Kiana Gant, NP        sodium chloride 0.9% flush 10 mL  10 mL Intravenous PRN Andi Murphy MD           Review of Systems   Neurological:  Positive for speech difficulty. Negative for dizziness, tremors, seizures, syncope, facial asymmetry, weakness, light-headedness, numbness and headaches.     Objective:     Vital Signs (Most Recent):  Temp: 97.5 °F  (36.4 °C) (08/28/24 0800)  Pulse: (!) 56 (08/28/24 0600)  Resp: 18 (08/28/24 0600)  BP: 134/72 (08/28/24 0600)  SpO2: 99 % (08/28/24 0600) Vital Signs (24h Range):  Temp:  [97 °F (36.1 °C)-99.7 °F (37.6 °C)] 97.5 °F (36.4 °C)  Pulse:  [] 56  Resp:  [15-30] 18  SpO2:  [83 %-99 %] 99 %  BP: (100-163)/() 134/72     Weight: 81 kg (178 lb 9.2 oz)  Body mass index is 28.03 kg/m².     Physical Exam  Vitals and nursing note reviewed.   Constitutional:       General: He is not in acute distress.     Appearance: Normal appearance. He is well-developed.   HENT:      Head: Normocephalic.      Right Ear: External ear normal.      Left Ear: External ear normal.      Nose: Nose normal.      Mouth/Throat:      Lips: Pink.      Mouth: Mucous membranes are dry.      Tongue: Tongue does not deviate from midline.   Eyes:      General: Lids are normal.      Extraocular Movements: Extraocular movements intact.      Conjunctiva/sclera: Conjunctivae normal.   Neurological:      Mental Status: He is alert, oriented to person, place, and time and easily aroused.      Coordination: Finger-Nose-Finger Test and Heel to Shin Test normal.   Psychiatric:         Speech: Speech normal.         Behavior: Behavior is cooperative.          NEUROLOGICAL EXAMINATION:     MENTAL STATUS   Oriented to person, place, and time.   Registration: recalls 3 of 3 objects.   Attention: normal. Concentration: normal.   Speech: speech is normal   Level of consciousness: alert  Knowledge: good.   Able to name object.     CRANIAL NERVES     CN II   Visual fields full to confrontation.     CN V   Facial sensation intact.     CN VII   Facial expression full, symmetric.     MOTOR EXAM   Muscle bulk: normal  Overall muscle tone: normal  Right arm tone: normal  Left arm tone: normal  Right leg tone: normal  Left leg tone: normal    Strength   Right deltoid: 5/5  Left deltoid: 5/5  Right biceps: 5/5  Left biceps: 5/5  Right quadriceps: 5/5  Left quadriceps:  5/5  Right hamstrin/5  Left hamstrin/5  Right anterior tibial: 5/5  Right posterior tibial: 5/5  Left posterior tibial: 5/5    SENSORY EXAM   Light touch normal.     GAIT AND COORDINATION      Coordination   Finger to nose coordination: normal  Heel to shin coordination: normal      Significant Labs:   Recent Lab Results  (Last 5 results in the past 24 hours)        24  0531   24  0407   24  0031   24  1750   24  1429        Albumin/Globulin Ratio   1.3             Albumin   3.3             ALP   37             ALT   46             Anion Gap   9.0             AST   154             Baso #   0.05             Basophil %   0.7             BILIRUBIN TOTAL   1.0             BUN   9.5             BUN/CREAT RATIO   12             Calcium   9.2             Chloride   114             CO2   21             Creatinine   0.80             eGFR   >60             Eos #   0.11             Eos %   1.6             Globulin, Total   2.5             Glucose   93             Hematocrit   37.5             Hemoglobin   13.3             Immature Grans (Abs)   0.02             Immature Granulocytes   0.3             Lymph #   0.90             LYMPH %   12.9             Magnesium    2.00             MCH   34.3             MCHC   35.5             MCV   96.6             Mono #   0.82             Mono %   11.8             MPV   11.7             Neut #   5.07             Neut %   72.7             nRBC   0.0             QRS Duration         74       OHS QTC Calculation         481       Phosphorus Level   2.2             Platelet Count   164             POCT Glucose 96     88   94         Potassium   3.8             PROTEIN TOTAL   5.8             RBC   3.88             RDW   12.4             Sodium   144             WBC   6.97                                    Significant Imaging: I have reviewed all pertinent imaging results/findings within the past 24 hours.

## 2024-08-28 NOTE — PLAN OF CARE
Problem: Adult Inpatient Plan of Care  Goal: Plan of Care Review  Outcome: Progressing  Goal: Patient-Specific Goal (Individualized)  Outcome: Progressing  Goal: Absence of Hospital-Acquired Illness or Injury  Outcome: Progressing  Goal: Optimal Comfort and Wellbeing  Outcome: Progressing  Goal: Readiness for Transition of Care  Outcome: Progressing     Problem: Wound  Goal: Optimal Coping  Outcome: Progressing  Goal: Optimal Functional Ability  Outcome: Progressing  Goal: Absence of Infection Signs and Symptoms  Outcome: Progressing  Goal: Improved Oral Intake  Outcome: Progressing  Goal: Optimal Pain Control and Function  Outcome: Progressing  Goal: Skin Health and Integrity  Outcome: Progressing  Goal: Optimal Wound Healing  Outcome: Progressing     Problem: Stroke, Ischemic (Includes Transient Ischemic Attack)  Goal: Optimal Coping  Outcome: Progressing  Goal: Effective Bowel Elimination  Outcome: Progressing  Goal: Optimal Cerebral Tissue Perfusion  Outcome: Progressing  Goal: Optimal Cognitive Function  Outcome: Progressing  Goal: Improved Communication Skills  Outcome: Progressing  Goal: Optimal Functional Ability  Outcome: Progressing  Goal: Optimal Nutrition Intake  Outcome: Progressing  Goal: Effective Oxygenation and Ventilation  Outcome: Progressing  Goal: Improved Sensorimotor Function  Outcome: Progressing  Goal: Safe and Effective Swallow  Outcome: Progressing  Goal: Effective Urinary Elimination  Outcome: Progressing     Problem: Fall Injury Risk  Goal: Absence of Fall and Fall-Related Injury  Outcome: Progressing     Problem: Skin Injury Risk Increased  Goal: Skin Health and Integrity  Outcome: Progressing

## 2024-08-28 NOTE — CONSULTS
"8/28/2024  Bi Whitley Jr.   1940   [id number]            Psychiatry Initial Consult Note    Date of Admission: 8/26/2024  3:33 PM    Chief Complaint: Altered Mental Status    SUBJECTIVE:   History of Present Illness:   Bi Whitley Jr. is a 84 y.o. 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 Symptoms gradually worsened and he presented to an outside facility. Had seizure like activity with rhythmic jerking of RUE and facial twitching upon examination by vascular neurology. He was subsequently intubated. CT head and neck showed no acute intracranial abnormalities specifically no early signs of ischemia and no intracranial hemorrhage. No LVO, no hemodynamically significant stenosis but limited due to motion artifact. Patient transferred straight to cath lab. Cerebral angiogram revealed no evidence of LVO or flow limiting stenosis. Patient extubated after procedure.  Reported to be agitated and hyperactive and psychiatry consulted.    Seen at the bedside where he is drowsy and requires frequent awakening to participate in interview. Was on Precedex this morning but not at time of interview. No episodes of agitation reported this morning. He reports depressed mood for past five years following a surgery that his wife underwent. Appears to have been on Wellbutrin up until approximately three months ago. He reports his last episode of euthymic mood as "three years ago". Endorses poor sleep and poor appetite over past several years. Denies loss of interest in normal activities or suicidal ideations. Denies history of episodes consistent with adeline/hypomania or psychosis. Displays impaired attention at this time. Oriented to person at this time.         Past Psychiatric History:   Previous Psychiatric Hospitalizations: Denies   Previous Medication Trials: Wellbutrin  Previous Suicide Attempts: Denies   Outpatient " psychiatrist: Recently seeing Sol Lorena SEGURA    Current Medications:   Home Psychiatric Meds: None    Past Medical/Surgical History:   Past Medical History:   Diagnosis Date    Francois esophagus     Prostate cancer      Past Surgical History:   Procedure Laterality Date    APPENDECTOMY      RADICAL PROSTATECTOMY      TONSILLECTOMY           Family Psychiatric History:   Denies     Allergies:   Review of patient's allergies indicates:  No Known Allergies    Substance Abuse History:   Tobacco: Denies  Alcohol: Denies  Illicit Substances: Denies  Treatment: Denies        Scheduled Meds:    aspirin  300 mg Rectal Daily    atorvastatin  40 mg Oral QHS    famotidine (PF)  20 mg Intravenous BID    levETIRAcetam (Keppra) IV (PEDS and ADULTS)  500 mg Intravenous Q12H    mupirocin   Nasal BID      PRN Meds:   Current Facility-Administered Medications:     bisacodyL, 10 mg, Rectal, Daily PRN    hydrALAZINE, 10 mg, Intravenous, Q6H PRN    labetalol, 10 mg, Intravenous, Q6H PRN    ondansetron, 4 mg, Intravenous, Q8H PRN    sodium chloride 0.9%, 10 mL, Intravenous, PRN   Psychotherapeutics (From admission, onward)      Start     Stop Route Frequency Ordered    08/27/24 1500  dexmedetomidine (PRECEDEX) 400mcg/100mL 0.9% NaCL infusion  (Second Choice (Under-sedated with analgesia medications) (propofol or precedex))        Question Answer Comment   Begin at (in mcg/kg/hr): 0.2    Titrate by (in mcg/kg/hr): 0.1    Titrate interval: (in minutes) 30    To maintain a RASS goal of: RASS (0) Alert and calm - Spontaneously pays attention to caregiver    Maximum dose of (in mcg/kg/hr): 1.4        -- IV Continuous 08/27/24 1348              Social History:  Housing Status: Lives with wife  Relationship Status/Sexual Orientation:    Children: 3  Education: MD   Employment Status/Info: Orthopedic surgeon    history: Unable to assess  History of physical/sexual abuse: Unable to assess   Access to gun: Unable to assess        Legal History:   Past Charges/Incarcerations: Unable to assess   Pending charges: Unable to assess      OBJECTIVE:       Vitals   Vitals:    08/28/24 0800   BP:    Pulse:    Resp:    Temp: 97.5 °F (36.4 °C)        Labs/Imaging/Studies:   Recent Results (from the past 48 hour(s))   Hemoglobin A1C    Collection Time: 08/26/24  8:22 PM   Result Value Ref Range    Hemoglobin A1c 5.1 <=7.0 %    Estimated Average Glucose 99.7 mg/dL   POCT Glucose, Hand-Held Device    Collection Time: 08/27/24  1:00 AM   Result Value Ref Range    POC Glucose 87 70 - 110 MG/DL   POCT glucose    Collection Time: 08/27/24  1:36 AM   Result Value Ref Range    POCT Glucose 87 70 - 110 mg/dL   Lipid panel    Collection Time: 08/27/24  2:29 AM   Result Value Ref Range    Cholesterol Total 213 (H) <=200 mg/dL    HDL Cholesterol 53 35 - 60 mg/dL    Triglyceride 70 34 - 140 mg/dL    Cholesterol/HDL Ratio 4 0 - 5    Very Low Density Lipoprotein 14     LDL Cholesterol 146.00 (H) 50.00 - 140.00 mg/dL   TSH    Collection Time: 08/27/24  2:29 AM   Result Value Ref Range    TSH 1.300 0.350 - 4.940 uIU/mL   Magnesium    Collection Time: 08/27/24  2:29 AM   Result Value Ref Range    Magnesium Level 1.80 1.60 - 2.60 mg/dL   Phosphorus    Collection Time: 08/27/24  2:29 AM   Result Value Ref Range    Phosphorus Level 1.5 (L) 2.3 - 4.7 mg/dL   Comprehensive metabolic panel    Collection Time: 08/27/24  2:29 AM   Result Value Ref Range    Sodium 143 136 - 145 mmol/L    Potassium 3.8 3.5 - 5.1 mmol/L    Chloride 111 (H) 98 - 107 mmol/L    CO2 20 (L) 23 - 31 mmol/L    Glucose 101 82 - 115 mg/dL    Blood Urea Nitrogen 11.9 8.4 - 25.7 mg/dL    Creatinine 0.88 0.73 - 1.18 mg/dL    Calcium 9.0 8.8 - 10.0 mg/dL    Protein Total 6.2 5.8 - 7.6 gm/dL    Albumin 3.8 3.4 - 4.8 g/dL    Globulin 2.4 2.4 - 3.5 gm/dL    Albumin/Globulin Ratio 1.6 1.1 - 2.0 ratio    Bilirubin Total 1.0 <=1.5 mg/dL    ALP 43 40 - 150 unit/L    ALT 18 0 - 55 unit/L    AST 35 (H) 5 - 34  unit/L    eGFR >60 mL/min/1.73/m2    Anion Gap 12.0 mEq/L    BUN/Creatinine Ratio 14    CBC with Differential    Collection Time: 08/27/24  2:29 AM   Result Value Ref Range    WBC 11.35 4.50 - 11.50 x10(3)/mcL    RBC 4.15 (L) 4.70 - 6.10 x10(6)/mcL    Hgb 14.0 14.0 - 18.0 g/dL    Hct 40.9 (L) 42.0 - 52.0 %    MCV 98.6 (H) 80.0 - 94.0 fL    MCH 33.7 (H) 27.0 - 31.0 pg    MCHC 34.2 33.0 - 36.0 g/dL    RDW 12.4 11.5 - 17.0 %    Platelet 201 130 - 400 x10(3)/mcL    MPV 11.3 (H) 7.4 - 10.4 fL    Neut % 78.7 %    Lymph % 11.5 %    Mono % 8.7 %    Eos % 0.3 %    Basophil % 0.5 %    Lymph # 1.30 0.6 - 4.6 x10(3)/mcL    Neut # 8.94 2.1 - 9.2 x10(3)/mcL    Mono # 0.99 0.1 - 1.3 x10(3)/mcL    Eos # 0.03 0 - 0.9 x10(3)/mcL    Baso # 0.06 <=0.2 x10(3)/mcL    IG# 0.03 0 - 0.04 x10(3)/mcL    IG% 0.3 %    NRBC% 0.0 %   POCT Glucose, Hand-Held Device    Collection Time: 08/27/24  5:35 AM   Result Value Ref Range    POC Glucose 104 70 - 110 MG/DL   EKG 12-lead    Collection Time: 08/27/24  2:29 PM   Result Value Ref Range    QRS Duration 74 ms    OHS QTC Calculation 481 ms   POCT glucose    Collection Time: 08/27/24  5:50 PM   Result Value Ref Range    POCT Glucose 94 70 - 110 mg/dL   POCT glucose    Collection Time: 08/28/24 12:31 AM   Result Value Ref Range    POCT Glucose 88 70 - 110 mg/dL   Magnesium    Collection Time: 08/28/24  4:07 AM   Result Value Ref Range    Magnesium Level 2.00 1.60 - 2.60 mg/dL   Phosphorus    Collection Time: 08/28/24  4:07 AM   Result Value Ref Range    Phosphorus Level 2.2 (L) 2.3 - 4.7 mg/dL   Comprehensive metabolic panel    Collection Time: 08/28/24  4:07 AM   Result Value Ref Range    Sodium 144 136 - 145 mmol/L    Potassium 3.8 3.5 - 5.1 mmol/L    Chloride 114 (H) 98 - 107 mmol/L    CO2 21 (L) 23 - 31 mmol/L    Glucose 93 82 - 115 mg/dL    Blood Urea Nitrogen 9.5 8.4 - 25.7 mg/dL    Creatinine 0.80 0.73 - 1.18 mg/dL    Calcium 9.2 8.8 - 10.0 mg/dL    Protein Total 5.8 5.8 - 7.6 gm/dL    Albumin  3.3 (L) 3.4 - 4.8 g/dL    Globulin 2.5 2.4 - 3.5 gm/dL    Albumin/Globulin Ratio 1.3 1.1 - 2.0 ratio    Bilirubin Total 1.0 <=1.5 mg/dL    ALP 37 (L) 40 - 150 unit/L    ALT 46 0 - 55 unit/L     (H) 5 - 34 unit/L    eGFR >60 mL/min/1.73/m2    Anion Gap 9.0 mEq/L    BUN/Creatinine Ratio 12    CBC with Differential    Collection Time: 08/28/24  4:07 AM   Result Value Ref Range    WBC 6.97 4.50 - 11.50 x10(3)/mcL    RBC 3.88 (L) 4.70 - 6.10 x10(6)/mcL    Hgb 13.3 (L) 14.0 - 18.0 g/dL    Hct 37.5 (L) 42.0 - 52.0 %    MCV 96.6 (H) 80.0 - 94.0 fL    MCH 34.3 (H) 27.0 - 31.0 pg    MCHC 35.5 33.0 - 36.0 g/dL    RDW 12.4 11.5 - 17.0 %    Platelet 164 130 - 400 x10(3)/mcL    MPV 11.7 (H) 7.4 - 10.4 fL    Neut % 72.7 %    Lymph % 12.9 %    Mono % 11.8 %    Eos % 1.6 %    Basophil % 0.7 %    Lymph # 0.90 0.6 - 4.6 x10(3)/mcL    Neut # 5.07 2.1 - 9.2 x10(3)/mcL    Mono # 0.82 0.1 - 1.3 x10(3)/mcL    Eos # 0.11 0 - 0.9 x10(3)/mcL    Baso # 0.05 <=0.2 x10(3)/mcL    IG# 0.02 0 - 0.04 x10(3)/mcL    IG% 0.3 %    NRBC% 0.0 %   POCT glucose    Collection Time: 08/28/24  5:31 AM   Result Value Ref Range    POCT Glucose 96 70 - 110 mg/dL   CV Ultrasound Bilateral Doppler Carotid    Collection Time: 08/28/24  8:52 AM   Result Value Ref Range    Right CCA prox sys 58 cm/s    Right CCA prox jennings 4 cm/s    Right CCA dist sys 74 cm/s    Right CCA dist jennings 9 cm/s    Right ICA prox sys 28 cm/s    Right ICA prox jennings 0 cm/s    Right ICA mid sys 46 cm/s    Right ICA mid jennings 8 cm/s    Right ICA dist sys 54 cm/s    Right ICA dist jennings 10 cm/s    Right ECA sys 62 cm/s    Right ECA jennings 0 cm/s    Right vertebral sys 48 cm/s    Right vertebral jennings 5 cm/s    Right ICA/CCA ratio 0.73     Right Highest ICA 54.00     Right Highest EDV 10.00     Right Highest CCA 74     Left CCA prox sys 105 cm/s    Left CCA prox jennings 12 cm/s    Left CCA dist sys 73 cm/s    Left CCA dist jennings 12 cm/s    Left ICA prox sys 39 cm/s    Left ICA prox jennings 8 cm/s     Left ICA mid sys 44 cm/s    Left ICA mid jennings 7 cm/s    Left ICA dist sys 28 cm/s    Left ICA dist jennings 6 cm/s    Left ECA sys 70 cm/s    Left ECA jennings 0 cm/s    Left vertebral sys 39 cm/s    Left vertebral jennings 8 cm/s    Left ICA/CCA ratio 0.60     Left Highest ICA 44.00     LT Highest EDV 8.00     Left Highest     Echo Saline Bubble? Yes    Collection Time: 08/28/24 10:09 AM   Result Value Ref Range    Matias's Biplane MOD Ejection Fraction 57 %    A2C EF 52 %    A4C EF 59 %    LVOT stroke volume 56.68 cm3    LVIDd 5.30 3.5 - 6.0 cm    LV Systolic Volume 44.10 mL    LVIDs 3.30 2.1 - 4.0 cm    LV ESV A2C 41.60 mL    LV Diastolic Volume 135.00 mL    LV ESV A4C 38.40 mL    LV EDV A2C [id number] mL    LV EDV A4C 77.00 mL    Left Ventricular End Systolic Volume by Teichholz Method 44.10 mL    Left Ventricular End Diastolic Volume by Teichholz Method 135.00 mL    IVS 1.00 0.6 - 1.1 cm    LVOT diameter 1.90 cm    LVOT area 2.8 cm2    FS 38 28 - 44 %    Left Ventricle Relative Wall Thickness 0.38 cm    Posterior Wall 1.00 0.6 - 1.1 cm    LV mass 200.40 g    MV Peak E Roberto 0.57 m/s    MV Peak A Roberto 0.78 m/s    TR Max Roberto 2.2 m/s    E/A ratio 0.73     E wave deceleration time 288.00 msec    LVOT peak roberto 0.81 m/s    Left Ventricular Outflow Tract Mean Velocity 0.50 cm/s    Left Ventricular Outflow Tract Mean Gradient 1.00 mmHg    TAPSE 2.33 cm    LA size 4.30 cm    LA volume (mod) 43.00 cm3    AV mean gradient 4 mmHg    AV peak gradient 7 mmHg    Ao peak roberto 1.32 m/s    Ao VTI 31.30 cm    LVOT peak VTI 20.00 cm    AV valve area 1.81 cm²    AV Velocity Ratio 0.61     AV index (prosthetic) 0.64     GALINA by Velocity Ratio 1.74 cm²    MV mean gradient 1 mmHg    MV peak gradient 3 mmHg    MV stenosis pressure 1/2 time 95.00 ms    MV valve area p 1/2 method 2.32 cm2    MV valve area by continuity eq 2.19 cm2    MV VTI 25.9 cm    Triscuspid Valve Regurgitation Peak Gradient 19 mmHg    LA area A4C 17.00 cm2    LA area  "A2C 16.30 cm2    Mitral Valve Heart Rate 54 bpm    TV resting pulmonary artery pressure 22 mmHg    RV TB RVSP 5 mmHg    Est. RA pres 3 mmHg    Sinus 3.6 cm    BSA 1.96 m2    LV Systolic Volume Index 22.8 mL/m2    LV Diastolic Volume Index 69.95 mL/m2    LV Mass Index 104 g/m2    LA Volume Index (Mod) 22.3 mL/m2    ZLVIDS -0.15     ZLVIDD -0.30       No results found for: "PHENYTOIN", "PHENOBARB", "VALPROATE", "CBMZ"        Psychiatric Mental Status Exam:  General Appearance: appears stated age, dressed in hospital garb, lying in bed, in no acute distress  Arousal: lethargic  Behavior: cooperative, polite, poor eye contact  Movements and Motor Activity: no tics, no tremors, no akathisia, no dystonia, no evidence of tardive dyskinesia  Orientation: oriented to person only  Speech: slowed  Mood: Depressed  Affect: calm  Thought Process: linear  Associations: no loosening of associations  Thought Content and Perceptions: no suicidal ideation, no homicidal ideation  Recent and Remote Memory: poor effort given during testing; per interview/observation with patient  Attention and Concentration: impaired; per interview/observation with patient  Fund of Knowledge: vocabulary appropriate; based on history, vocabulary, fund of knowledge, syntax, grammar, and content  Insight: questionable; based on understanding of severity of illness and HPI  Judgment: questionable; based on patient's behavior and HPI        ASSESSMENT/PLAN:   Diagnoses:  Delirium F05  Depressive Disorder NO    Past Medical History:   Diagnosis Date    Francois esophagus     Prostate cancer           Problem lists and Management Plans:  Medication management  Remeron 15mg PO QHS for insomnia/depression  Recommend delirium precautions  PEC not recommended at this time.   Will continue to follow.          Thad Magallon   "

## 2024-08-28 NOTE — PLAN OF CARE
Problem: Adult Inpatient Plan of Care  Goal: Plan of Care Review  Outcome: Progressing  Goal: Patient-Specific Goal (Individualized)  Outcome: Progressing  Goal: Absence of Hospital-Acquired Illness or Injury  Outcome: Progressing  Goal: Optimal Comfort and Wellbeing  Outcome: Progressing     Problem: Wound  Goal: Optimal Functional Ability  Outcome: Progressing  Goal: Absence of Infection Signs and Symptoms  Outcome: Progressing  Goal: Skin Health and Integrity  Outcome: Progressing     Problem: Stroke, Ischemic (Includes Transient Ischemic Attack)  Goal: Optimal Cognitive Function  Outcome: Progressing  Goal: Improved Communication Skills  Outcome: Progressing  Goal: Optimal Functional Ability  Outcome: Progressing

## 2024-08-28 NOTE — PROGRESS NOTES
[facility] - 7th Floor ICU  Neurology  Progress Note    Patient Name: Bi Whitley Jr.  MRN: [id number]  Admission Date: 8/26/2024  Hospital Length of Stay: 2 days  Code Status: Full Code   Attending Provider: Myke Santamaria MD  Primary Care Physician: Kevan French MD   Principal Problem:<principal problem not specified>    HPI:   Bi Whitley Jr. is a 84 y.o. male with past medical history prostate cancer, hypothyroidism, former cigarette smoker, quit smoking over 40 years ago smoker presents as transfer from [facility] for interventional neurology, Dr. Murphy accepting physician. 8/26/2024 presented to [facility] ED in [place] related to acute onset right sided weakness with aphasia. Last known normal 900 am per vascular documentation. Arrived [facility] ED 1040 am, telestroke 1057am received TNK 1130 am at [facility] ED. Per transfer records CTH negative. CTA motion artifact without occlusion, stenosis, or dissection. Patient was VAN positive at [facility] ED. Upon arrive to [facility] patient went directly to cath lab for cerebral angiogram with Dr. Murphy.            Subjective:     Interval History: Much improved resting staying calm without Precedex, strength is normal, no facial drop. Dry mouth affecting ability to articulate.   MRI brain completed no acute intracranial findings.     Current Facility-Administered Medications   Medication Dose Route Frequency Provider Last Rate Last Admin    0.9%  NaCl infusion   Intravenous Continuous Andi Murphy MD   Paused at 08/26/24 2033    0.9%  NaCl infusion   Intravenous Continuous Kiana Gant  mL/hr at 08/28/24 0628 Rate Verify at 08/28/24 0628    aspirin suppository 300 mg  300 mg Rectal Daily Kiana Gant, NP   300 mg at 08/28/24 0920    atorvastatin tablet 40 mg  40 mg Oral QHS Kiana Gant, NP        bisacodyL suppository 10 mg  10 mg Rectal Daily PRN Kiana Gant, JONI         dexmedetomidine (PRECEDEX) 400mcg/100mL 0.9% NaCL infusion  0-1.4 mcg/kg/hr Intravenous Continuous Melgoza, Radhames MILLIGAN MD 8.1 mL/hr at 08/28/24 0628 0.4 mcg/kg/hr at 08/28/24 0628    famotidine (PF) injection 20 mg  20 mg Intravenous BID Broderick Hernandez MD   20 mg at 08/28/24 0920    hydrALAZINE injection 10 mg  10 mg Intravenous Q6H PRN Sedrick Shah MD        labetaloL injection 10 mg  10 mg Intravenous Q6H PRN Sedrick Shah MD        levETIRAcetam (Keppra) 500 mg in D5W 100 mL IVPB (MB+)  500 mg Intravenous Q12H Broderick Hernandez MD   Stopped at 08/28/24 0951    mirtazapine tablet 15 mg  15 mg Oral QHS Thad Magallon, JONI        mupirocin 2 % ointment   Nasal BID Ap Jeong MD   Given at 08/28/24 0920    ondansetron injection 4 mg  4 mg Intravenous Q8H PRN Kiana Gant NP        sodium chloride 0.9% flush 10 mL  10 mL Intravenous PRN Andi Murphy MD           Review of Systems   Neurological:  Positive for speech difficulty. Negative for dizziness, tremors, seizures, syncope, facial asymmetry, weakness, light-headedness, numbness and headaches.     Objective:     Vital Signs (Most Recent):  Temp: 97.5 °F (36.4 °C) (08/28/24 0800)  Pulse: (!) 56 (08/28/24 0600)  Resp: 18 (08/28/24 0600)  BP: 134/72 (08/28/24 0600)  SpO2: 99 % (08/28/24 0600) Vital Signs (24h Range):  Temp:  [97 °F (36.1 °C)-99.7 °F (37.6 °C)] 97.5 °F (36.4 °C)  Pulse:  [] 56  Resp:  [15-30] 18  SpO2:  [83 %-99 %] 99 %  BP: (100-163)/() 134/72     Weight: 81 kg (178 lb 9.2 oz)  Body mass index is 28.03 kg/m².     Physical Exam  Vitals and nursing note reviewed.   Constitutional:       General: He is not in acute distress.     Appearance: Normal appearance. He is well-developed.   HENT:      Head: Normocephalic.      Right Ear: External ear normal.      Left Ear: External ear normal.      Nose: Nose normal.      Mouth/Throat:      Lips: Pink.      Mouth: Mucous membranes are dry.      Tongue: Tongue does not deviate  from midline.   Eyes:      General: Lids are normal.      Extraocular Movements: Extraocular movements intact.      Conjunctiva/sclera: Conjunctivae normal.   Neurological:      Mental Status: He is alert, oriented to person, place, and time and easily aroused.      Coordination: Finger-Nose-Finger Test and Heel to Shin Test normal.   Psychiatric:         Speech: Speech normal.         Behavior: Behavior is cooperative.          NEUROLOGICAL EXAMINATION:     MENTAL STATUS   Oriented to person, place, and time.   Registration: recalls 3 of 3 objects.   Attention: normal. Concentration: normal.   Speech: speech is normal   Level of consciousness: alert  Knowledge: good.   Able to name object.     CRANIAL NERVES     CN II   Visual fields full to confrontation.     CN V   Facial sensation intact.     CN VII   Facial expression full, symmetric.     MOTOR EXAM   Muscle bulk: normal  Overall muscle tone: normal  Right arm tone: normal  Left arm tone: normal  Right leg tone: normal  Left leg tone: normal    Strength   Right deltoid: 5/5  Left deltoid: 5/5  Right biceps: 5/5  Left biceps: 5/5  Right quadriceps: 5/5  Left quadriceps: 5/5  Right hamstrin/5  Left hamstrin/5  Right anterior tibial: 5/5  Right posterior tibial: 5/5  Left posterior tibial: 5/5    SENSORY EXAM   Light touch normal.     GAIT AND COORDINATION      Coordination   Finger to nose coordination: normal  Heel to shin coordination: normal      Significant Labs:   Recent Lab Results  (Last 5 results in the past 24 hours)        24  0531   24  0407   24  0031   24  1750   24  1429        Albumin/Globulin Ratio   1.3             Albumin   3.3             ALP   37             ALT   46             Anion Gap   9.0             AST   154             Baso #   0.05             Basophil %   0.7             BILIRUBIN TOTAL   1.0             BUN   9.5             BUN/CREAT RATIO   12             Calcium   9.2             Chloride    114             CO2   21             Creatinine   0.80             eGFR   >60             Eos #   0.11             Eos %   1.6             Globulin, Total   2.5             Glucose   93             Hematocrit   37.5             Hemoglobin   13.3             Immature Grans (Abs)   0.02             Immature Granulocytes   0.3             Lymph #   0.90             LYMPH %   12.9             Magnesium    2.00             MCH   34.3             MCHC   35.5             MCV   96.6             Mono #   0.82             Mono %   11.8             MPV   11.7             Neut #   5.07             Neut %   72.7             nRBC   0.0             QRS Duration         74       [facility] QTC Calculation         481       Phosphorus Level   2.2             Platelet Count   164             POCT Glucose 96     88   94         Potassium   3.8             PROTEIN TOTAL   5.8             RBC   3.88             RDW   12.4             Sodium   144             WBC   6.97                                    Significant Imaging: I have reviewed all pertinent imaging results/findings within the past 24 hours.  Assessment and Plan:     Right sided weakness  - presented with right sided weakness, aphasia status post TNK at 1130 am on 8/26/2024 at [facility] in [place], [place].   - Stroke RF: former smoker   - Intervention: TNK   - Etiology: TBD    Stroke workup:  -CTh: Negative   8/27/2024 CTh at 24 hours post TNK (2mg versed used for sedation). Impression: No acute intracranial abnormality. Chronic microvascular ischemic changes.  -CTA h/n:  Motion artifact without occlusion, stenosis or dissection   -MRI brain: No acute intracranial findings.   -ECHO: EF 55-60%. Agitated saline study of the atrial septum is negative, suggesting absence of intracardiac shunt at the atrial level.   - CUS: ordered  -LDL: 146  -A1c: 5.1   -TSH: normal    -home medications listed include:  Nexium 40 mg, Pepcid 20 mg, Zegerid, Vyvanse 20 mg,      Plan:  S/p  TNK  -permissive HTN for now ... SBP less than 180  - q1hr neuro checks  - STAT CTh for any HA or neuro change  - repeat CTh after 24 hours completed, no acute findings  - ASA 300mg rectal, change to asa 81 mg when cleared to safely swallow.  - begin Atorvastatin 40 mg when cleared to safely swallow  - MRI of brain no acute findings   - no longer indication for Precedex   - psych consult placed yesterday evaluate/treat   - continue PT/OT/ST evaluations          VTE Risk Mitigation (From admission, onward)           Ordered     Reason for No Pharmacological VTE Prophylaxis  Once        Question:  Reasons:  Answer:  Risk of Bleeding    08/26/24 1604     IP VTE HIGH RISK PATIENT  Once         08/26/24 1604     Place sequential compression device  Until discontinued         08/26/24 1604                    Kiana Gant NP  Neurology  [facility] - 7th Floor ICU    I have seen/examined the patient.  NP was scribe.  I agree with the findings unless outlined below:    Radhames Melgoza MD  [facility]

## 2024-08-28 NOTE — PT/OT/SLP PROGRESS
Physical Therapy Treatment    Patient Name:  Bi Whitley Jr.   MRN:  [id number]    Recommendations:     Discharge therapy intensity:  (intensity still pending but he is improving)   Discharge Equipment Recommendations:  (tbd)  Barriers to discharge: None    Assessment:     Bi Whitley Jr. is a 84 y.o. male admitted with a medical diagnosis of R weakness, seizure; s/p TNK; L MCA syndrome s/p cerebral angiogram .  He presents with the following impairments/functional limitations: weakness, gait instability, impaired balance, impaired self care skills, impaired endurance, impaired functional mobility, decreased safety awareness     Rehab Prognosis: Good; patient would benefit from acute skilled PT services to address these deficits and reach maximum level of function.    Recent Surgery: * No procedures listed * 2 Days Post-Op    Plan:     During this hospitalization, patient would benefit from acute PT services 6 x/week to address the identified rehab impairments via gait training, therapeutic activities, therapeutic exercises and progress toward the following goals:    Plan of Care Expires:  09/27/24    Subjective     Chief Complaint:   Patient/Family Comments/goals:   Pain/Comfort:         Objective:     Communicated with nurse prior to session.  Patient found supine with blood pressure cuff, pulse ox (continuous), telemetry, peripheral IV upon PT entry to room.     General Precautions: Standard, fall  Orthopedic Precautions: N/A  Braces: N/A  Respiratory Status: Room air  Blood Pressure:   Skin Integrity: Visible skin intact      Functional Mobility:  Bed Mobility:     Supine to Sit: contact guard assistance  Sit to Supine: contact guard assistance  Transfers:     Sit to Stand:  contact guard assistance with rolling walker  Bed to Chair: contact guard assistance with  rolling walker  using  Step Transfer  Gait: ambulated with a rw x 2 attempts for 40feet and 25ft with cga. Pt did get weak toward end of walk. Was  left up in chair with chair alarm    Therapeutic Activities/Exercises:      Education:  Patient provided with verbal education education regarding PT role/goals/POC, fall prevention, and safety awareness.  Understanding was verbalized, however additional teaching warranted.     Patient left up in chair with chair alarm on and nurse present    GOALS:   Multidisciplinary Problems       Physical Therapy Goals          Problem: Physical Therapy    Goal Priority Disciplines Outcome Goal Variances Interventions   Physical Therapy Goal     PT, PT/OT Progressing     Description: Goals to be met by: 24     Patient will increase functional independence with mobility by performin. Supine to sit with Stand-by Assistance  2. Sit to supine with Stand-by Assistance  3. Sit to stand transfer with Stand-by Assistance  4. Gait  x 150 feet with Stand-by Assistance using Rolling Walker.                          Time Tracking:     PT Received On:    PT Start Time: 1245     PT Stop Time: 1308  PT Total Time (min): 23 min     Billable Minutes: Gait Training 23    Treatment Type: Treatment  PT/PTA: PT     Number of PTA visits since last PT visit: 1, 5     2024

## 2024-08-28 NOTE — PROGRESS NOTES
[facility] - 7th Floor ICU  Pulmonary Critical Care Note    Patient Name: Bi Whitley Jr.  MRN: [id number]  Admission Date: 8/26/2024  Hospital Length of Stay: 2 days  Code Status: Full Code  Attending Provider: Myke Santamaria MD  Primary Care Provider: Kevan French MD     Subjective:     HPI:   Patient is a 84 year old male w/ PMHx of Prostate cancer, History of Smoking who presents w/ progressive right sided weakness and new onset seizures on 8/26/24. His LKN was around 0900 while he was at the gym and started to note subtle RUE weakness and slurred speech. Symptoms gradually worsened and patient presented to outside facility. Patient had seizure like activity w/ rhythmic jerking of his RUE and facial twitching upon examination of telemedicine vascular neurology. Patient received 6 mg IV Ativan, Keppra, and TNK (anywhere between 1100 - 1200).  He was subsequently intubated. CT head and neck showed no acute intracranial abnormalities  specifically no early signs of ischemia and no intracranial hemorrhage. No LVO, no hemodynamically significant stenosis but limited due to motion artifact. Patient transferred straight to cath lab. Cerebral angiogram revealed no evidence of LVO or flow limiting stenosis. Patient extubated after procedure.      Hospital Course/Significant events:  8/26/2024: Admit to ICU    24 Hour Interval History:  Continues on precedex for intermittent agitation. MRI brain pending for need for anesthesia. He tells me his name but not oriented to place or time.     Past Medical History:   Diagnosis Date    Francois esophagus     Prostate cancer        Past Surgical History:   Procedure Laterality Date    APPENDECTOMY      RADICAL PROSTATECTOMY      TONSILLECTOMY         Social History     Socioeconomic History    Marital status:    Tobacco Use    Smoking status: Former     Social Determinants of Health     Financial Resource Strain: Low Risk  (8/27/2024)    Overall  Financial Resource Strain (CARDIA)     Difficulty of Paying Living Expenses: Not hard at all   Food Insecurity: No Food Insecurity (8/27/2024)    Hunger Vital Sign     Worried About Running Out of Food in the Last Year: Never true     Ran Out of Food in the Last Year: Never true   Transportation Needs: No Transportation Needs (8/27/2024)    TRANSPORTATION NEEDS     Transportation : No   Physical Activity: Sufficiently Active (8/27/2024)    Exercise Vital Sign     Days of Exercise per Week: 5 days     Minutes of Exercise per Session: 60 min   Stress: Patient Unable To Answer (8/27/2024)    [employer] [place] [employer] - Occupational Stress Questionnaire     Feeling of Stress : Patient unable to answer   Housing Stability: Low Risk  (8/27/2024)    Housing Stability Vital Sign     Unable to Pay for Housing in the Last Year: No     Homeless in the Last Year: No           Current Outpatient Medications   Medication Instructions    esomeprazole (NEXIUM) 40 mg, Oral, 2 times daily before meals    famotidine (PEPCID) 20 mg, Oral, 2 times daily    lisdexamfetamine (VYVANSE) 20 mg, Oral, Every morning    omeprazole-sodium bicarbonate (ZEGERID) 40-1.1 mg-gram per capsule 1 capsule, Oral, Before breakfast       Current Inpatient Medications   aspirin  300 mg Rectal Daily    atorvastatin  40 mg Oral QHS    famotidine (PF)  20 mg Intravenous BID    levETIRAcetam (Keppra) IV (PEDS and ADULTS)  500 mg Intravenous Q12H    mupirocin   Nasal BID       Current Intravenous Infusions   0.9% NaCl   Intravenous Continuous   Paused at 08/26/24 2033    0.9% NaCl   Intravenous Continuous 100 mL/hr at 08/28/24 0628 Rate Verify at 08/28/24 0628    dexmedeTOMIDine (Precedex) infusion (titrating)  0-1.4 mcg/kg/hr Intravenous Continuous 8.1 mL/hr at 08/28/24 0628 0.4 mcg/kg/hr at 08/28/24 0628         Review of Systems   Unable to perform ROS: Acuity of condition          Objective:       Intake/Output Summary (Last 24 hours) at  8/28/2024 0839  Last data filed at 8/28/2024 0628  Gross per 24 hour   Intake 2464.34 ml   Output 2250 ml   Net 214.34 ml         Vital Signs (Most Recent):  Temp: 97.2 °F (36.2 °C) (08/28/24 0400)  Pulse: (!) 56 (08/28/24 0600)  Resp: 18 (08/28/24 0600)  BP: 134/72 (08/28/24 0600)  SpO2: 99 % (08/28/24 0600)  Body mass index is 27.97 kg/m² (pended).  Weight: 81 kg (178 lb 9.2 oz) Vital Signs (24h Range):  Temp:  [97 °F (36.1 °C)-99.7 °F (37.6 °C)] 97.2 °F (36.2 °C)  Pulse:  [] 56  Resp:  [14-30] 18  SpO2:  [83 %-99 %] 99 %  BP: (100-198)/() 134/72     Physical Exam  Vitals and nursing note reviewed.   Constitutional:       Appearance: Normal appearance.   HENT:      Head: Normocephalic and atraumatic.      Mouth/Throat:      Mouth: Mucous membranes are moist.   Eyes:      Pupils: Pupils are equal, round, and reactive to light.   Cardiovascular:      Rate and Rhythm: Normal rate and regular rhythm.      Pulses: Normal pulses.      Heart sounds: No murmur heard.  Pulmonary:      Effort: No respiratory distress.      Breath sounds: No wheezing.   Abdominal:      General: Bowel sounds are normal.      Palpations: Abdomen is soft.      Tenderness: There is no abdominal tenderness.   Musculoskeletal:      Right lower leg: No edema.      Left lower leg: No edema.   Skin:     General: Skin is warm.      Capillary Refill: Capillary refill takes less than 2 seconds.   Neurological:      Comments: He is oriented to person but not place or time. He remains on precedex. He moves all 4 extremities.              Lines/Drains/Airways       Drain  Duration             Male External Urinary Catheter 08/26/24 1530 Other (Comment) 1 day              Peripheral Intravenous Line  Duration                  Peripheral IV - Single Lumen 08/26/24 1545 20 G 1 1/4 in Anterior;Distal;Left Forearm 1 day         Peripheral IV - Single Lumen 08/26/24 2215 22 G Anterior;Left Shoulder 1 day         Peripheral IV - Single Lumen 08/26/24  "2230 20 G Anterior;Right Upper Arm 1 day                    Significant Labs:    Lab Results   Component Value Date    WBC 6.97 08/28/2024    HGB 13.3 (L) 08/28/2024    HCT 37.5 (L) 08/28/2024    MCV 96.6 (H) 08/28/2024     08/28/2024           BMP  Lab Results   Component Value Date     08/28/2024    K 3.8 08/28/2024    CO2 21 (L) 08/28/2024    BUN 9.5 08/28/2024    CREATININE 0.80 08/28/2024    CALCIUM 9.2 08/28/2024    AGAP 9.0 08/28/2024         ABG  No results for input(s): "PH", "PO2", "PCO2", "HCO3", "POCBASEDEF" in the last 168 hours.      Significant Imaging:  I have reviewed the pertinent imaging within the past 24 hours.        Assessment/Plan:     Assessment  Stroke vs small vessel lacunar stroke vs breakthrough seizure  S/p TNK on 8/26/2024 around (11 to 12 pm)  S/p cerebral angiogram with no LVO or flow limiting stenosis  Hyperlipidemia  ; Total 213    Plan  MRI brain ordered - will likely need anesthesia  Continue neuro checks  Wean precedex as tolerated - psych has been consulted per neurology  Continue keppra for seizure prophylaxis  ASA and statin ordered   Continue ICU monitoring     DVT Prophylaxis: SCDs  GI Prophylaxis: Famotidine     32 minutes of critical care was time spent personally by me on the following activities: development of treatment plan with patient or surrogate and bedside caregivers, discussions with consultants, evaluation of patient's response to treatment, examination of patient, ordering and performing treatments and interventions, ordering and review of laboratory studies, ordering and review of radiographic studies, pulse oximetry, re-evaluation of patient's condition.  This critical care time did not overlap with that of any other provider or involve time for any procedures.     ALANNA Arnold  Pulmonary Critical Care Medicine  [facility] - 7th Floor ICU  DOS: 08/28/2024     "

## 2024-08-29 LAB
ALBUMIN SERPL-MCNC: 3.4 G/DL (ref 3.4–4.8)
ALBUMIN/GLOB SERPL: 1.3 RATIO (ref 1.1–2)
ALP SERPL-CCNC: 42 UNIT/L (ref 40–150)
ALT SERPL-CCNC: 54 UNIT/L (ref 0–55)
ANION GAP SERPL CALC-SCNC: 12 MEQ/L
AST SERPL-CCNC: 117 UNIT/L (ref 5–34)
BASOPHILS # BLD AUTO: 0.07 X10(3)/MCL
BASOPHILS NFR BLD AUTO: 0.9 %
BILIRUB SERPL-MCNC: 0.8 MG/DL
BUN SERPL-MCNC: 11.8 MG/DL (ref 8.4–25.7)
CALCIUM SERPL-MCNC: 9.3 MG/DL (ref 8.8–10)
CHLORIDE SERPL-SCNC: 114 MMOL/L (ref 98–107)
CO2 SERPL-SCNC: 18 MMOL/L (ref 23–31)
CREAT SERPL-MCNC: 0.86 MG/DL (ref 0.73–1.18)
CREAT/UREA NIT SERPL: 14
EOSINOPHIL # BLD AUTO: 0.13 X10(3)/MCL (ref 0–0.9)
EOSINOPHIL NFR BLD AUTO: 1.7 %
ERYTHROCYTE [DISTWIDTH] IN BLOOD BY AUTOMATED COUNT: 12.7 % (ref 11.5–17)
GFR SERPLBLD CREATININE-BSD FMLA CKD-EPI: >60 ML/MIN/1.73/M2
GLOBULIN SER-MCNC: 2.7 GM/DL (ref 2.4–3.5)
GLUCOSE SERPL-MCNC: 78 MG/DL (ref 82–115)
HCT VFR BLD AUTO: 40 % (ref 42–52)
HGB BLD-MCNC: 13.8 G/DL (ref 14–18)
IMM GRANULOCYTES # BLD AUTO: 0.03 X10(3)/MCL (ref 0–0.04)
IMM GRANULOCYTES NFR BLD AUTO: 0.4 %
LYMPHOCYTES # BLD AUTO: 1.06 X10(3)/MCL (ref 0.6–4.6)
LYMPHOCYTES NFR BLD AUTO: 13.6 %
MAGNESIUM SERPL-MCNC: 1.8 MG/DL (ref 1.6–2.6)
MCH RBC QN AUTO: 34 PG (ref 27–31)
MCHC RBC AUTO-ENTMCNC: 34.5 G/DL (ref 33–36)
MCV RBC AUTO: 98.5 FL (ref 80–94)
MONOCYTES # BLD AUTO: 0.91 X10(3)/MCL (ref 0.1–1.3)
MONOCYTES NFR BLD AUTO: 11.6 %
NEUTROPHILS # BLD AUTO: 5.62 X10(3)/MCL (ref 2.1–9.2)
NEUTROPHILS NFR BLD AUTO: 71.8 %
NRBC BLD AUTO-RTO: 0 %
PHOSPHATE SERPL-MCNC: 2.2 MG/DL (ref 2.3–4.7)
PLATELET # BLD AUTO: 178 X10(3)/MCL (ref 130–400)
PMV BLD AUTO: 11.7 FL (ref 7.4–10.4)
POCT GLUCOSE: 123 MG/DL (ref 70–110)
POCT GLUCOSE: 62 MG/DL (ref 70–110)
POTASSIUM SERPL-SCNC: 3.8 MMOL/L (ref 3.5–5.1)
PROT SERPL-MCNC: 6.1 GM/DL (ref 5.8–7.6)
RBC # BLD AUTO: 4.06 X10(6)/MCL (ref 4.7–6.1)
SODIUM SERPL-SCNC: 144 MMOL/L (ref 136–145)
WBC # BLD AUTO: 7.82 X10(3)/MCL (ref 4.5–11.5)

## 2024-08-29 PROCEDURE — 63600175 PHARM REV CODE 636 W HCPCS

## 2024-08-29 PROCEDURE — 25000003 PHARM REV CODE 250: Performed by: INTERNAL MEDICINE

## 2024-08-29 PROCEDURE — 97535 SELF CARE MNGMENT TRAINING: CPT

## 2024-08-29 PROCEDURE — 99232 SBSQ HOSP IP/OBS MODERATE 35: CPT | Mod: ,,, | Performed by: SPECIALIST

## 2024-08-29 PROCEDURE — 25000003 PHARM REV CODE 250

## 2024-08-29 PROCEDURE — 36415 COLL VENOUS BLD VENIPUNCTURE: CPT

## 2024-08-29 PROCEDURE — 83735 ASSAY OF MAGNESIUM: CPT | Performed by: NURSE PRACTITIONER

## 2024-08-29 PROCEDURE — 97110 THERAPEUTIC EXERCISES: CPT

## 2024-08-29 PROCEDURE — 93005 ELECTROCARDIOGRAM TRACING: CPT

## 2024-08-29 PROCEDURE — 25000003 PHARM REV CODE 250: Performed by: ANESTHESIOLOGY

## 2024-08-29 PROCEDURE — 84100 ASSAY OF PHOSPHORUS: CPT | Performed by: NURSE PRACTITIONER

## 2024-08-29 PROCEDURE — 25000003 PHARM REV CODE 250: Performed by: NURSE PRACTITIONER

## 2024-08-29 PROCEDURE — 97116 GAIT TRAINING THERAPY: CPT

## 2024-08-29 PROCEDURE — 85025 COMPLETE CBC W/AUTO DIFF WBC: CPT

## 2024-08-29 PROCEDURE — 80053 COMPREHEN METABOLIC PANEL: CPT

## 2024-08-29 PROCEDURE — 92611 MOTION FLUOROSCOPY/SWALLOW: CPT

## 2024-08-29 PROCEDURE — 93010 ELECTROCARDIOGRAM REPORT: CPT | Mod: ,,, | Performed by: INTERNAL MEDICINE

## 2024-08-29 PROCEDURE — 21400001 HC TELEMETRY ROOM

## 2024-08-29 RX ORDER — MECLIZINE HCL 12.5 MG 12.5 MG/1
12.5 TABLET ORAL 3 TIMES DAILY PRN
Status: DISCONTINUED | OUTPATIENT
Start: 2024-08-29 | End: 2024-09-03 | Stop reason: HOSPADM

## 2024-08-29 RX ORDER — ASPIRIN 81 MG/1
81 TABLET ORAL DAILY
Status: DISCONTINUED | OUTPATIENT
Start: 2024-08-30 | End: 2024-09-03 | Stop reason: HOSPADM

## 2024-08-29 RX ORDER — GLUCAGON 1 MG
1 KIT INJECTION
Status: DISCONTINUED | OUTPATIENT
Start: 2024-08-29 | End: 2024-09-03 | Stop reason: HOSPADM

## 2024-08-29 RX ORDER — FAMOTIDINE 20 MG/1
20 TABLET, FILM COATED ORAL 2 TIMES DAILY
Status: DISCONTINUED | OUTPATIENT
Start: 2024-08-29 | End: 2024-09-03 | Stop reason: HOSPADM

## 2024-08-29 RX ORDER — LEVETIRACETAM 500 MG/1
500 TABLET ORAL 2 TIMES DAILY
Status: DISCONTINUED | OUTPATIENT
Start: 2024-08-29 | End: 2024-09-03 | Stop reason: HOSPADM

## 2024-08-29 RX ADMIN — FAMOTIDINE 20 MG: 20 TABLET, FILM COATED ORAL at 08:08

## 2024-08-29 RX ADMIN — LEVETIRACETAM 500 MG: 100 INJECTION, SOLUTION INTRAVENOUS at 08:08

## 2024-08-29 RX ADMIN — LEVETIRACETAM 500 MG: 500 TABLET, FILM COATED ORAL at 08:08

## 2024-08-29 RX ADMIN — MIRTAZAPINE 15 MG: 15 TABLET, FILM COATED ORAL at 08:08

## 2024-08-29 RX ADMIN — FAMOTIDINE 20 MG: 10 INJECTION, SOLUTION INTRAVENOUS at 08:08

## 2024-08-29 RX ADMIN — DEXTROSE MONOHYDRATE 125 ML: 100 INJECTION, SOLUTION INTRAVENOUS at 06:08

## 2024-08-29 RX ADMIN — MUPIROCIN: 20 OINTMENT TOPICAL at 08:08

## 2024-08-29 RX ADMIN — ATORVASTATIN CALCIUM 40 MG: 40 TABLET, FILM COATED ORAL at 08:08

## 2024-08-29 RX ADMIN — ASPIRIN 300 MG: 300 SUPPOSITORY RECTAL at 08:08

## 2024-08-29 NOTE — PLAN OF CARE
Problem: Adult Inpatient Plan of Care  Goal: Plan of Care Review  Outcome: Progressing  Goal: Patient-Specific Goal (Individualized)  Outcome: Progressing  Goal: Absence of Hospital-Acquired Illness or Injury  Outcome: Progressing  Goal: Optimal Comfort and Wellbeing  Outcome: Progressing  Goal: Readiness for Transition of Care  Outcome: Progressing     Problem: Wound  Goal: Optimal Coping  Outcome: Progressing  Goal: Optimal Functional Ability  Outcome: Progressing  Goal: Absence of Infection Signs and Symptoms  Outcome: Progressing  Goal: Improved Oral Intake  Outcome: Progressing  Goal: Optimal Pain Control and Function  Outcome: Progressing  Goal: Skin Health and Integrity  Outcome: Progressing  Goal: Optimal Wound Healing  Outcome: Progressing     Problem: Stroke, Ischemic (Includes Transient Ischemic Attack)  Goal: Optimal Coping  Outcome: Progressing  Goal: Effective Bowel Elimination  Outcome: Progressing  Goal: Optimal Cerebral Tissue Perfusion  Outcome: Progressing  Goal: Optimal Cognitive Function  Outcome: Progressing  Goal: Improved Communication Skills  Outcome: Progressing  Goal: Optimal Functional Ability  Outcome: Progressing  Goal: Optimal Nutrition Intake  Outcome: Progressing  Goal: Effective Oxygenation and Ventilation  Outcome: Progressing  Goal: Improved Sensorimotor Function  Outcome: Progressing  Goal: Safe and Effective Swallow  Outcome: Progressing  Goal: Effective Urinary Elimination  Outcome: Progressing     Problem: Fall Injury Risk  Goal: Absence of Fall and Fall-Related Injury  Outcome: Progressing     Problem: Restraint, Nonviolent  Goal: Absence of Harm or Injury  Outcome: Progressing     Problem: Skin Injury Risk Increased  Goal: Skin Health and Integrity  Outcome: Progressing

## 2024-08-29 NOTE — PROGRESS NOTES
[facility] - 7th Floor ICU  Progress Note    Subjective:     Interval History:     MRI brain without contrast negative.    Psych has evaluated patient added Remeron.    Patient improved back at baseline.    Downgraded to floor    Post-Op Info:  * No procedures listed *   3 Days Post-Op      Medications:  Continuous Infusions:   0.9% NaCl   Intravenous Continuous   Paused at 08/26/24 2033    0.9% NaCl   Intravenous Continuous   Stopped at 08/28/24 0921     Scheduled Meds:   aspirin  300 mg Rectal Daily    atorvastatin  40 mg Oral QHS    famotidine (PF)  20 mg Intravenous BID    levETIRAcetam (Keppra) IV (PEDS and ADULTS)  500 mg Intravenous Q12H    mirtazapine  15 mg Oral QHS    mupirocin   Nasal BID     PRN Meds:  Current Facility-Administered Medications:     bisacodyL, 10 mg, Rectal, Daily PRN    dextrose 10%, 12.5 g, Intravenous, PRN    dextrose 10%, 25 g, Intravenous, PRN    glucagon (human recombinant), 1 mg, Intramuscular, PRN    hydrALAZINE, 10 mg, Intravenous, Q6H PRN    labetalol, 10 mg, Intravenous, Q6H PRN    ondansetron, 4 mg, Intravenous, Q8H PRN    sodium chloride 0.9%, 10 mL, Intravenous, PRN     Review of Systems  Objective:     Weight: 81 kg (178 lb 9.2 oz)  Body mass index is 28.03 kg/m².  Vital Signs (Most Recent):  Temp: 98 °F (36.7 °C) (08/29/24 0000)  Pulse: 89 (08/29/24 0500)  Resp: 19 (08/29/24 0500)  BP: (!) 144/71 (08/29/24 0500)  SpO2: 97 % (08/29/24 0500) Vital Signs (24h Range):  Temp:  [97.8 °F (36.6 °C)-98 °F (36.7 °C)] 98 °F (36.7 °C)  Pulse:  [80-89] 89  Resp:  [11-20] 19  SpO2:  [96 %-98 %] 97 %  BP: (119-144)/() 144/71                         Male External Urinary Catheter 08/26/24 1530 Other (Comment) (Active)   Collection Container Other (Comment) 08/29/24 0400   Securement Method secured to top of thigh w/ adhesive device 08/29/24 0400   Skin no redness;no breakdown 08/29/24 0400   Tolerance no signs/symptoms of discomfort 08/29/24 0400   Output (mL) 800 mL  "08/27/24 1700   Catheter Change Date 08/27/24 08/27/24 1800   Catheter Change Time 1700 08/27/24 1800       Neurosurgery Physical Exam  Awake alert oriented   Moving all extremities   No focal neurological deficits       Significant Labs:  Recent Labs   Lab 08/28/24 0407 08/29/24  0419    144   K 3.8 3.8   * 114*   CO2 21* 18*   BUN 9.5 11.8   CREATININE 0.80 0.86   CALCIUM 9.2 9.3   MG 2.00 1.80     Recent Labs   Lab 08/28/24  0407 08/29/24  0419   WBC 6.97 7.82   HGB 13.3* 13.8*   HCT 37.5* 40.0*    178     No results for input(s): "LABPT", "INR", "APTT" in the last 48 hours.  Microbiology Results (last 7 days)       ** No results found for the last 168 hours. **              Assessment/Plan:    Right sided weakness  - presented with right sided weakness, aphasia status post TNK at 1130 am on 8/26/2024 at [facility] in [place], [place].   - Stroke RF: former smoker   - Intervention: TNK   - Etiology: TBD     Stroke workup:  -CTh: Negative   8/27/2024 CTh at 24 hours post TNK (2mg versed used for sedation). Impression: No acute intracranial abnormality. Chronic microvascular ischemic changes.  -CTA h/n:  Motion artifact without occlusion, stenosis or dissection   -MRI brain: No acute intracranial findings.   -ECHO: EF 55-60%. Agitated saline study of the atrial septum is negative, suggesting absence of intracardiac shunt at the atrial level.   - CUS: ordered  -LDL: 146  -A1c: 5.1   -TSH: normal    -home medications listed include:  Nexium 40 mg, Pepcid 20 mg, Zegerid, Vyvanse 20 mg,          Plan:  S/p TNK  -permissive HTN for now ... SBP less than 180  - ASA 300mg rectal, change to asa 81 mg when cleared to safely swallow.  - begin Atorvastatin 40 mg when cleared to safely swallow  - MRI of brain no acute findings   - no longer indication for Precedex   - continue PT/OT/ST evaluations    Appreciate psych consult the added Remeron.    Patient is downgraded   MRI brain without " contrast was negative  Working with speech therapy going to have a modified barium swallow.    Nothing else to do from our standpoint  Patient improved in back at neurological baseline.     Active Diagnoses:    Diagnosis Date Noted POA    Right sided weakness [R53.1] 08/26/2024 Yes      Problems Resolved During this Admission:       Evelyne Arriaza, [facility] - 7th Floor ICU

## 2024-08-29 NOTE — PLAN OF CARE
Problem: Adult Inpatient Plan of Care  Goal: Plan of Care Review  Outcome: Progressing  Goal: Patient-Specific Goal (Individualized)  Outcome: Progressing  Goal: Absence of Hospital-Acquired Illness or Injury  Outcome: Progressing  Goal: Optimal Comfort and Wellbeing  Outcome: Progressing  Goal: Readiness for Transition of Care  Outcome: Progressing     Problem: Wound  Goal: Improved Oral Intake  Outcome: Progressing  Goal: Skin Health and Integrity  Outcome: Progressing

## 2024-08-29 NOTE — PLAN OF CARE
Problem: SLP  Goal: SLP Goal  Description:   LTG: Tolerate least restrictive PO diet with no clinical signs/sx aspiration  STGs:  1.  Complete base of tongue and laryngeal strengthening exercises with minimal cues  2.  Tolerate thermal stimulation to the anterior faucial pillars with 100% effortful swallow responses and delay less than 2 seconds.  3. Pt will tolerate 2 oz of ice chips by spoon with no clinical signs/sx aspiration.     Outcome: Progressing

## 2024-08-29 NOTE — PROCEDURES
[facility]  Speech Language Pathology Department  Modified Barium Swallow (MBS) Study    Patient Name:  Bi Whitley Jr.   MRN:  [id number]    Recommendations     General recommendations:  dysphagia therapy and Speech/Language and Cognitive Evaluation  Repeat MBS study: 10-14 days  Solid texture recommendation:  Soft & Bite Sized Diet - IDDSI Level 6  Liquid consistency recommendation: Mildly thick liquids - IDDSI Level 2   Medications: crushed in puree  Swallow strategies/precautions: small bites/sips, slow rate, additional swallow per bite/sip, and alternate solids/liquids  General precautions: aspiration    History     Bi Whitley Jr. is a/n 84 y.o. male admitted to ICU s/p cerebral angiogram after being transferred from an outside facility s/p IV thrombolytics for interventional neurology services after presenting with slurred speech, RUE weakness.     Past Medical History:   Diagnosis Date    Francios esophagus     Prostate cancer      Past Surgical History:   Procedure Laterality Date    APPENDECTOMY      RADICAL PROSTATECTOMY      TONSILLECTOMY       A MBS Study was completed to assess the efficiency of his swallow function, rule out aspiration and make recommendations regarding safe dietary consistencies, effective compensatory strategies, and safe eating environment.     Home diet texture/consistency: Regular and thin liquids  Current Method of Nutrition: NPO    Imaging   Results for orders placed during the hospital encounter of 08/26/24    X-Ray Chest 1 View    Narrative  EXAMINATION:  XR CHEST 1 VIEW    CLINICAL HISTORY:  rhonchi;    TECHNIQUE:  Single frontal view of the chest was performed.    COMPARISON:  None    FINDINGS:  No infiltrates are seen.  There faint increased markings in the left lung base most consistent with atelectasis.  Heart size is within normal limits.  There is vascular calcification noted.    Impression  Faint increased markings in the left lung base  most consistent with atelectasis.      Electronically signed by: Clif Christensen MD  Date:    08/26/2024  Time:    17:20    MRI Brain Without Contrast    Narrative  EXAMINATION:  MRI BRAIN WITHOUT CONTRAST    CLINICAL HISTORY:  Stroke, follow up;    TECHNIQUE:  Routine unenhanced brain MRI.    COMPARISON:  CT yesterday.    FINDINGS:  Diffusion imaging is negative for acute infarct.  There is moderate patchy increased T2 and FLAIR signal in the cerebral white matter which is nonspecific but most commonly associated with chronic small vessel ischemic changes. There is moderate global atrophy.  Ventricles are not significantly enlarged    Signal voids are visible in the intracranial internal carotid arteries bilaterally and in the basilar artery implying gross patency.  Mild paranasal sinus inflammation    Impression  No acute intracranial findings.      Electronically signed by: Mane Saeed  Date:    08/28/2024  Time:    11:05    Subjective     Patient awake, alert, and cooperative.    Spiritual/Cultural/Buddhist Beliefs/Practices that affect care: no  Pain/Comfort: Pain Rating 1: 0/10    Respiratory Status:  room air    Restraints/positioning devices: none    Fluoroscopic Findings     Oral Musculature  Dentition: own teeth  Secretion Management: adequate  Mucosal Quality: good  Facial Movement: general weakness  Buccal Strength & Mobility: WFL  Mandibular Strength & Mobility: WFL  Oral Labial Strength & Mobility: WFL  Lingual Strength & Mobility: WFL  Velar Elevation: WFL  Vocal Quality: adequate    Setup  Upright in bed  Able to self feed  Adequate head control    Visualization  Lateral view  Anatomical changes (pharyngeal narrowing due to c-spine curvature)    Oral Phase:   Reduced lip closure  Anterior spillage  Adequate bolus formation  Prolonged mastication  Decreased rotary chew  Adequate bolus cohesion  Adequate anterior-posterior transport  Loss of bolus control with liquids    Pharyngeal Phase:   Swallow  delay with spill to the pyriform sinuses  Reduced base of tongue retraction  Reduced epiglottic deflection  Reduced hyolaryngeal excursion  Poor airway protection  Consistency Fed by Laryngeal Penetration Aspiration Residue   Mildly thick liquid by cup Self None None Moderate  Valleculae  Did NOT fully clear   Thin liquid by cup Self During the swallow  To the vocal folds SILENT  After the swallow  Cued cough NOT productive Moderate  Valleculae and Pyriform sinus  Did NOT fully clear   Puree SLP None None Moderate  Valleculae  Did NOT fully clear   Mildly thick liquid by straw SLP None None Moderate  Valleculae  Did NOT fully clear   Chewable solid SLP None None Moderate  Valleculae  Did NOT fully clear     Cervical Esophageal Phase:   UES appeared to accommodate all bolus types without stasis or retrograde movement visualized    Compensatory Strategies:  Additional swallows reduced, but did NOT clear pharyngeal residue    Assessment     Pt exhibited moderate oropharyngeal dysphagia characterized by the findings noted above.  SILENT aspiration of thin liquids visualized.  Cued cough was NOT effective for clearing contrast material from the trachea.  Both swallow safety and efficiency are impaired.     Patient appears to be at moderate risk for aspiration related pneumonia when considering severity of dysphagia, complicated medical status, poor airway closure, reduced mobility, and weak/ineffective cough.  Prognosis for behavioral swallow rehabilitation is fair.    Goals     Multidisciplinary Problems       SLP Goals          Problem: SLP    Goal Priority Disciplines Outcome   SLP Goal     SLP Progressing   Description:   LTG: Tolerate least restrictive PO diet with no clinical signs/sx aspiration  STGs:  1.  Complete base of tongue and laryngeal strengthening exercises with minimal cues  2.  Tolerate thermal stimulation to the anterior faucial pillars with 100% effortful swallow responses and delay less than 2  seconds.  3. Pt will tolerate 2 oz of ice chips by spoon with no clinical signs/sx aspiration.                        Education     Patient provided with verbal education and video regarding MBS results/recommendations.  Understanding was verbalized, however additional teaching warranted.    Plan     SLP Follow-Up:  Yes    Patient to be seen:  5 x/week   Plan of Care expires:  08/30/24  Plan of Care reviewed with:  patient     Time Tracking     SLP Treatment Date:   08/29/24  Speech Start Time:  1145  Speech Stop Time:  1205     Speech Total Time (min):  20 min    Billable minutes:   Motion Fluoroscopic Evaluation, Video Recording, 20 minutes     08/29/2024

## 2024-08-29 NOTE — H&P
[facility] Hospital Medicine History & Physical Examination       Patient Name: Bi Whitley Jr.  MRN: [id number]  Patient Class: IP- Inpatient   Admission Date: 8/26/2024   Admitting Physician: Andi Murphy MD   Length of Stay: 3  Attending Physician: Gonzalo Grewal MD  Primary Care Provider: Non staff  Face-to-Face encounter date: 08/29/2024  Code Status: Full Code   Chief Complaint: seizure      Patient information was obtained from patient, patient's family, past medical records and ER records.     HISTORY OF PRESENT ILLNESS:   Bi Whitley Jr. is a 84 y.o. male with a past medical history of Francois's esophagus, prostate cancer status post radical prostatectomy. The patient presented to [facility] on 8/26/2024 as a transfer from [facility] for Interventional Neurology.  He presented with primary complaint of new onset seizures, right upper extremity weakness and slurred speech. CT of the head was negative. Patient received TNK. CTA of the head and neck with no acute intracranial abnormalities.  Patient was transferred to [facility] and underwent  cerebral angiogram on 06/28/2024 with Dr. Murphy with no evidence of LVEF flow-limiting stenosis.  Patient was extubated following procedure.  MRI of the brain with no acute intracranial findings.  Psych consult was placed for agitation and hyperactivity.On exam, he states he feels okay. He denies chest pain, shortness of breath, nausea, vomiting and diarrhea.  He was cleared for downgrade from ICU to regular floor on 08/29/2024.  He is admitted to hospital medicine services for assumption of care.    PAST MEDICAL HISTORY:     Past Medical History:   Diagnosis Date    Francois esophagus     Prostate cancer        PAST SURGICAL HISTORY:     Past Surgical History:   Procedure Laterality Date    APPENDECTOMY      RADICAL PROSTATECTOMY      TONSILLECTOMY         ALLERGIES:   Patient has no known allergies.    FAMILY HISTORY:    Mother and father with myocardial infarction    SOCIAL HISTORY:   Former smoker  Denies alcohol and illicit drug use     Screening for Social Drivers for health:  Patient screened for food insecurity, housing instability, transportation needs, utility difficulties, and interpersonal safety (select all that apply as identified as concern)  []Housing or Food  []Transportation Needs  []Utility Difficulties  []Interpersonal safety  [x]None    HOME MEDICATIONS:     Prior to Admission medications    Medication Sig Start Date End Date Taking? Authorizing Provider   esomeprazole (NEXIUM) 40 MG capsule Take 1 capsule (40 mg total) by mouth 2 (two) times daily before meals. 8/3/23 8/2/24  Yordy Sharp MD   famotidine (PEPCID) 20 MG tablet Take 20 mg by mouth 2 (two) times daily.    Provider, Historical   lisdexamfetamine (VYVANSE) 20 MG capsule Take 20 mg by mouth every morning.    Provider, Historical   omeprazole-sodium bicarbonate (ZEGERID) 40-1.1 mg-gram per capsule Take 1 capsule by mouth before breakfast.    Provider, Historical       REVIEW OF SYSTEMS:   Except as documented, all other systems reviewed and negative     PHYSICAL EXAM:     VITAL SIGNS: 24 HRS MIN & MAX LAST   Temp  Min: 97.8 °F (36.6 °C)  Max: 98 °F (36.7 °C) 98 °F (36.7 °C)   BP  Min: 119/88  Max: 144/71 (!) 144/71   Pulse  Min: 80  Max: 89  89   Resp  Min: 11  Max: 20 19   SpO2  Min: 96 %  Max: 98 % 97 %       General appearance: Well-developed, well-nourished male in no apparent distress. Family at bedside.  HEENT: Atraumatic head. Moist mucous membranes of oral cavity.  Lungs: Clear to auscultation bilaterally.   Heart: Regular rate and rhythm.   Abdomen: Soft, non-distended, non-tender. Bowel sounds are normal.   Extremities: No cyanosis, clubbing. No deformities.  Skin: No Rash. Warm and dry.  Neuro: Awake, alert and oriented. 5/5 strength to all extremities. Normal speech.  Psych/mental status: Appropriate mood and affect. Cooperative.  Responds appropriately to questions.       LABS AND IMAGING:     Recent Labs   Lab 08/27/24 0229 08/28/24 0407 08/29/24 0419   WBC 11.35 6.97 7.82   RBC 4.15* 3.88* 4.06*   HGB 14.0 13.3* 13.8*   HCT 40.9* 37.5* 40.0*   MCV 98.6* 96.6* 98.5*   MCH 33.7* 34.3* 34.0*   MCHC 34.2 35.5 34.5   RDW 12.4 12.4 12.7    164 178   MPV 11.3* 11.7* 11.7*       Recent Labs   Lab 08/27/24 0229 08/28/24 0407 08/29/24 0419    144 144   K 3.8 3.8 3.8   * 114* 114*   CO2 20* 21* 18*   BUN 11.9 9.5 11.8   CREATININE 0.88 0.80 0.86   CALCIUM 9.0 9.2 9.3   MG 1.80 2.00 1.80   ALBUMIN 3.8 3.3* 3.4   ALKPHOS 43 37* 42   ALT 18 46 54   AST 35* 154* 117*   BILITOT 1.0 1.0 0.8       Microbiology Results (last 7 days)       ** No results found for the last 168 hours. **             Fl Modified Barium Swallow Speech  See procedure notes from Speech Pathologist.    This procedure was auto-finalized.        ASSESSMENT & PLAN:   Assessment:  Right-sided weakness status post TNK, resolved  ? Seizure activity- currently on keppra  Macrocytic anemia, stable  Metabolic acidosis  Francois's esophagus, prostate cancer    Plan:  - Continue with physical, occupational and speech therapy  - Continue with psych recommendations  - Resume appropriate home medications when deemed necessary   - Labs in AM  - check with neuro at [place] [facility] continuing keppra    VTE Prophylaxis: will be placed on SCD for DVT prophylaxis and will be advised to be as mobile as possible and sit in a chair as tolerated      __________________________________________________________________________  INPATIENT LIST OF MEDICATIONS     Current Facility-Administered Medications:     0.9%  NaCl infusion, , Intravenous, Continuous, Andi Murphy MD, Paused at 08/26/24 2033    0.9%  NaCl infusion, , Intravenous, Continuous, Kiana Gant NP, Stopped at 08/28/24 0921    aspirin suppository 300 mg, 300 mg, Rectal, Daily, Kiana Gant NP, 300 mg at 08/29/24  0835    atorvastatin tablet 40 mg, 40 mg, Oral, QHS, Kiana Gant, NP    bisacodyL suppository 10 mg, 10 mg, Rectal, Daily PRN, Kiana Gant, NP    dextrose 10% bolus 125 mL 125 mL, 12.5 g, Intravenous, PRN, Puneet Anthonyua, DO, Stopped at 08/29/24 0644    dextrose 10% bolus 250 mL 250 mL, 25 g, Intravenous, PRN, Anthony, Magdaleno, DO    famotidine (PF) injection 20 mg, 20 mg, Intravenous, BID, Broderick Hernandez MD, 20 mg at 08/29/24 0835    glucagon (human recombinant) injection 1 mg, 1 mg, Intramuscular, PRN, Raj, Magdaleno, DO    hydrALAZINE injection 10 mg, 10 mg, Intravenous, Q6H PRN, Sedrick Shah MD    labetaloL injection 10 mg, 10 mg, Intravenous, Q6H PRN, Sedrick Shah MD    levETIRAcetam (Keppra) 500 mg in D5W 100 mL IVPB (MB+), 500 mg, Intravenous, Q12H, Broderick Hernandez MD, Stopped at 08/29/24 0907    mirtazapine tablet 15 mg, 15 mg, Oral, QHS, Thad Magallon NP    mupirocin 2 % ointment, , Nasal, BID, Ap Jeong MD, Given at 08/29/24 0835    ondansetron injection 4 mg, 4 mg, Intravenous, Q8H PRN, Kiana Gant, NP    sodium chloride 0.9% flush 10 mL, 10 mL, Intravenous, PRN, Andi Murphy MD      Scheduled Meds:   aspirin  300 mg Rectal Daily    atorvastatin  40 mg Oral QHS    famotidine (PF)  20 mg Intravenous BID    levETIRAcetam (Keppra) IV (PEDS and ADULTS)  500 mg Intravenous Q12H    mirtazapine  15 mg Oral QHS    mupirocin   Nasal BID     Continuous Infusions:   0.9% NaCl   Intravenous Continuous   Paused at 08/26/24 2033    0.9% NaCl   Intravenous Continuous   Stopped at 08/28/24 0921     PRN Meds:.  Current Facility-Administered Medications:     bisacodyL, 10 mg, Rectal, Daily PRN    dextrose 10%, 12.5 g, Intravenous, PRN    dextrose 10%, 25 g, Intravenous, PRN    glucagon (human recombinant), 1 mg, Intramuscular, PRN    hydrALAZINE, 10 mg, Intravenous, Q6H PRN    labetalol, 10 mg, Intravenous, Q6H PRN    ondansetron, 4 mg, Intravenous, Q8H PRN    sodium chloride  0.9%, 10 mL, Intravenous, PRN      Discharge Planning and Disposition: Anticipated discharge to be determined.    I, JANUSZ Belcher, have reviewed and discussed the case with Dr. Gonzalo Grewal MD    Please see the following addendum for further assessment and plan from there attending MD.      Portion of this note is dictated using EMR integrated voice recognition software and may be subjected to voice recognition errors not corrected with proofreading. Please  for clarification if needed.       JANUSZ Geiger-C  08/29/2024      MANNY Grewal performed substantive portion of the visit, personally performed a face to face evaluation of the patient and have reviewed and agree with NP/PA documentation, treatment and plan & MDM.       84-year-old  gentleman with the above-mentioned medical history, presented with acute onset progressively worsening right-sided weakness and possible seizure activity associated with slurred speech, not associated with bladder or bowel incontinence.  Patient had no recollection of the events to hospital admission.  Patient was witnessed to have rhythmic jerking activity of upper extremity associated with facial twitching requiring Ativan.  He was given tPA and admitted to ICU for neuro checks.  He was also intubated for airway protection.  EEG was ordered and Keppra was continued due to suspicion for seizure activity.  Urgent Cerebral angiogram showed no evidence of large vessel occlusion or flow-limiting stenosis.  He was later extubated with no further issues.  Stroke workup was continued.  Bubble study was negative.  Carotid Doppler showed no significant stenosis. MRI brain showed no acute intracranial changes.  Psychiatry was consulted due to episodic confusion and they recommended adding Remeron bedtime.  Aspirin statin and Keppra were continued and patient is being downgraded to hospital medicine service for continuity of care.      His  mental status returned to baseline but patient has no recollection of the events leading to hospital admission.  When seen at bedside he was alert, oriented, answers most of questions appropriately.  Physical exam was unremarkable and he has good strength with intact speech and no facial deficits.  Son was at bedside.  Just cleared by SLP for p.o. intake.  Patient's son is planning to take his father to [place] soon.      Pt does mention remote history of PVCs and ectopic beats and has no diagnosis of AFib in the past.  EKG at admission revealed tachycardia with no arrhythmia.  He will get repeat EKG today and telemetry will be continued.  Agree with HPI, exam, plan mentioned above.  Continue aspirin, statin and Keppra.  Agree with continuation of Remeron.  Mobilize with PT.  Switch meds to p.o..

## 2024-08-29 NOTE — NURSING
Nurses Note -- 4 Eyes      8/29/2024   6:41 PM      Skin assessed during: Q Shift Change      [x] No Altered Skin Integrity Present    [x]Prevention Measures Documented      [] Yes- Altered Skin Integrity Present or Discovered   [] LDA Added if Not in Epic (Describe Wound)   [] New Altered Skin Integrity was Present on Admit and Documented in LDA   [] Wound Image Taken    Wound Care Consulted? No    Attending Nurse:  Og Hernandez rn    Second RN/Staff Member:  selene Elder

## 2024-08-29 NOTE — ANESTHESIA POSTPROCEDURE EVALUATION
Anesthesia Post Evaluation    Patient: Bi Whitley     Procedure(s) Performed: * No procedures listed *    Final Anesthesia Type: general          Anesthetic complications: no                    Vitals Value Taken Time   /78 08/29/24 1301   Temp 36.7 °C (98 °F) 08/29/24 0000   Pulse 79 08/29/24 1339   Resp 21 08/29/24 1339   SpO2 86 % 08/29/24 1308   Vitals shown include unfiled device data.      No case tracking events are documented in the log.      Pain/Ian Score: No data recorded

## 2024-08-29 NOTE — PT/OT/SLP PROGRESS
Occupational Therapy   Treatment    Name: Bi Whitley Jr.  MRN: [id number]    Recommendations:     Recommended therapy intensity at discharge: High Intensity Therapy (pending progress)   Discharge Equipment Recommendations:  walker, rolling  Barriers to discharge:  Other (Comment) (ongoing medical needs)    Assessment:     Bi Whitley Jr. is a 84 y.o. male with a medical diagnosis of R weakness, seizure; s/p TNK; L MCA syndrome s/p cerebral angiogram. Performance deficits affecting function are weakness, impaired endurance, impaired self care skills, impaired functional mobility, gait instability, impaired balance, decreased safety awareness, decreased lower extremity function, decreased upper extremity function. He required CGA-min A for functional mobility within room and hallway using RW; pt continues to present with decreased safety awareness. Noted x1 major LOB requiring mod A to correct. Pt able to follow commands and answer questions appropriately this date. Recommending high intensity therapy upon d/c to maximize independence with functional tasks.     Rehab Prognosis:  Good; patient would benefit from acute skilled OT services to address these deficits and reach maximum level of function.       Plan:     Patient to be seen 6 x/week to address the above listed problems via self-care/home management, therapeutic activities, therapeutic exercises, neuromuscular re-education  Plan of Care Expires: 09/27/24  Plan of Care Reviewed with: patient, family    Subjective     Pain/Comfort:  Pain Rating 1: 0/10    Objective:     Communicated with: RN prior to session.  Patient found HOB elevated with blood pressure cuff, pulse ox (continuous), telemetry, peripheral IV upon OT entry to room.    General Precautions: Standard, aspiration (SBP<180)    Orthopedic Precautions:N/A  Braces: N/A  Respiratory Status: Room air  Vital Signs: Blood Pressure: 134/72     Occupational Performance:     Bed Mobility:    Patient completed  Supine to Sit with contact guard assistance     Functional Mobility/Transfers:  Patient completed Sit <> Stand Transfer with contact guard assistance  with  rolling walker   Patient completed Bed <> Chair Transfer using Step Transfer technique with contact guard assistance and minimum assistance with rolling walker  Functional Mobility: pt ambulated within room and hallway with CGA-min A using rolling walker. Pt with x1 LOB and required mod A to correct.     Activities of Daily Living:  Lower Body Dressing: moderate assistance to don socks while sitting EOB.     Therapeutic Positioning    OT interventions performed during the course of today's session in an effort to prevent and/or reduce acquired pressure injuries:   Therapeutic positioning was provided at the conclusion of session to offload all bony prominences for the prevention and/or reduction of pressure injuries    Skin assessment: all bony prominences were assessed    Findings:  Visible skin intact.     [facility] 6 Click ADL: 15    Patient Education:  Patient and family were provided with verbal education education regarding OT role/goals/POC, fall prevention, safety awareness, Discharge/DME recommendations, and pressure ulcer prevention.  Understanding was verbalized.      Patient left up in chair with all lines intact, call button in reach, chair alarm on, RN notified, and family present.    GOALS:   Multidisciplinary Problems       Occupational Therapy Goals          Problem: Occupational Therapy    Goal Priority Disciplines Outcome Interventions   Occupational Therapy Goal     OT, PT/OT Progressing    Description: LTG: Pt will perform basic ADLs and ADL transfers with Modified independence using LRAD by discharge.    STG: to be met by 9/27/24    Pt will complete grooming standing at sink with LRAD with SBA.  Pt will complete UB dressing with SBA.  Pt will complete LB dressing with SBA using LRAD.  Pt will complete toileting with SBA using LRAD.  Pt will  complete functional mobility to/from toilet and toilet transfer with SBA using LRAD.                        Time Tracking:     OT Date of Treatment: 08/29/24  OT Start Time: 1103  OT Stop Time: 1126  OT Total Time (min): 23 min    Billable Minutes:Self Care/Home Management 1.   Therapeutic Exercise 1.     OT/LINETTE: OT     Number of LINETTE visits since last OT visit: 1    8/29/2024

## 2024-08-29 NOTE — PT/OT/SLP PROGRESS
Physical Therapy Treatment    Patient Name:  Bi Whitley Jr.   MRN:  [id number]    Recommendations:     Discharge therapy intensity: High Intensity Therapy   Discharge Equipment Recommendations: walker, rolling  Barriers to discharge: Impaired mobility and Ongoing medical needs    Assessment:     Bi Whitley Jr. is a 84 y.o. male admitted with a medical diagnosis of R weakness, seizure; s/p TNK; L MCA syndrome s/p cerebral angiogram.  He presents with the following impairments/functional limitations: weakness, gait instability, impaired endurance, impaired balance, impaired functional mobility, decreased safety awareness, decreased lower extremity function.    Pt amb 50ft +50ft min A with RW. Pt with 2 bouts of impulsivity/unsteadiness req min A to maintain balance and safety. Recommending high intensity therapy at this time due to patients current deficits and to assist patient back to functional independence.     Rehab Prognosis: Good; patient would benefit from acute skilled PT services to address these deficits and reach maximum level of function.    Recent Surgery: * No procedures listed * 3 Days Post-Op    Plan:     During this hospitalization, patient would benefit from acute PT services 6 x/week to address the identified rehab impairments via gait training, therapeutic activities, therapeutic exercises, neuromuscular re-education and progress toward the following goals:    Plan of Care Expires:  09/27/24    Subjective     Chief Complaint: none stated   Patient/Family Comments/goals: PLOF  Pain/Comfort:  Pain Rating 1: 0/10      Objective:     Communicated with RN prior to session.  Patient found HOB elevated with pulse ox (continuous), telemetry, peripheral IV, blood pressure cuff upon PT entry to room.     General Precautions: Standard, fall, aspiration  Orthopedic Precautions: N/A  Braces: N/A  Respiratory Status: Room air  Blood Pressure: 134/72  Skin Integrity: Visible skin intact      Functional  Mobility:  Bed Mobility:     Supine to Sit: contact guard assistance  Transfers:     Sit to Stand:  contact guard assistance with rolling walker  Gait: Pt amb 50ft +50ft Min A with RW. Pt with 2 bouts of unsteadiness/impulsivity req Min A for safety and correction.   Pt needing multiple manual and verbal cues for R hand placement on walker due to improper hand placement    Therapeutic Activities/Exercises:    Education:  Patient and family were provided with verbal education education regarding PT role/goals/POC, fall prevention, and safety awareness.  Understanding was verbalized.     Patient left up in chair with all lines intact, call button in reach, chair alarm on, RN notified, and family present    GOALS:   Multidisciplinary Problems       Physical Therapy Goals          Problem: Physical Therapy    Goal Priority Disciplines Outcome Goal Variances Interventions   Physical Therapy Goal     PT, PT/OT Progressing     Description: Goals to be met by: 24     Patient will increase functional independence with mobility by performin. Supine to sit with Stand-by Assistance  2. Sit to supine with Stand-by Assistance  3. Sit to stand transfer with Stand-by Assistance  4. Gait  x 150 feet with Stand-by Assistance using Rolling Walker.                          Time Tracking:     PT Received On: 24  PT Start Time: 1103     PT Stop Time: 1126  PT Total Time (min): 23 min     Billable Minutes: Gait Training 23    Treatment Type: Treatment  PT/PTA: PT     Number of PTA visits since last PT visit: 2     2024

## 2024-08-29 NOTE — PROGRESS NOTES
[facility] - 7th Floor ICU  Pulmonary Critical Care Note    Patient Name: Bi Whitley Jr.  MRN: [id number]  Admission Date: 8/26/2024  Hospital Length of Stay: 3 days  Code Status: Full Code  Attending Provider: Myke aSntamaria MD  Primary Care Provider: Kevan French MD     Subjective:     HPI:   Patient is a 84 year old male w/ PMHx of Prostate cancer, History of Smoking who presents w/ progressive right sided weakness and new onset seizures on 8/26/24. His LKN was around 0900 while he was at the gym and started to note subtle RUE weakness and slurred speech. Symptoms gradually worsened and patient presented to outside facility. Patient had seizure like activity w/ rhythmic jerking of his RUE and facial twitching upon examination of telemedicine vascular neurology. Patient received 6 mg IV Ativan, Keppra, and TNK (anywhere between 1100 - 1200).  He was subsequently intubated. CT head and neck showed no acute intracranial abnormalities  specifically no early signs of ischemia and no intracranial hemorrhage. No LVO, no hemodynamically significant stenosis but limited due to motion artifact. Patient transferred straight to cath lab. Cerebral angiogram revealed no evidence of LVO or flow limiting stenosis. Patient extubated after procedure.      Hospital Course/Significant events:  8/26/2024: Admit to ICU    24 Hour Interval History:  MRI yesterday with no acute findings. He is no longer on precedex. He is much more alert and awake today. Completely back to his neuro baseline. He is hungry and awaitng MBS.     Past Medical History:   Diagnosis Date    Francois esophagus     Prostate cancer        Past Surgical History:   Procedure Laterality Date    APPENDECTOMY      RADICAL PROSTATECTOMY      TONSILLECTOMY         Social History     Socioeconomic History    Marital status:    Tobacco Use    Smoking status: Former     Social Determinants of Health     Financial Resource Strain: Low Risk   (8/27/2024)    Overall Financial Resource Strain (CARDIA)     Difficulty of Paying Living Expenses: Not hard at all   Food Insecurity: No Food Insecurity (8/27/2024)    Hunger Vital Sign     Worried About Running Out of Food in the Last Year: Never true     Ran Out of Food in the Last Year: Never true   Transportation Needs: No Transportation Needs (8/27/2024)    TRANSPORTATION NEEDS     Transportation : No   Physical Activity: Sufficiently Active (8/27/2024)    Exercise Vital Sign     Days of Exercise per Week: 5 days     Minutes of Exercise per Session: 60 min   Stress: Patient Unable To Answer (8/27/2024)    [employer] [place] [employer] - Occupational Stress Questionnaire     Feeling of Stress : Patient unable to answer   Housing Stability: Low Risk  (8/27/2024)    Housing Stability Vital Sign     Unable to Pay for Housing in the Last Year: No     Homeless in the Last Year: No           Current Outpatient Medications   Medication Instructions    esomeprazole (NEXIUM) 40 mg, Oral, 2 times daily before meals    famotidine (PEPCID) 20 mg, Oral, 2 times daily    lisdexamfetamine (VYVANSE) 20 mg, Oral, Every morning    omeprazole-sodium bicarbonate (ZEGERID) 40-1.1 mg-gram per capsule 1 capsule, Oral, Before breakfast       Current Inpatient Medications   aspirin  300 mg Rectal Daily    atorvastatin  40 mg Oral QHS    famotidine (PF)  20 mg Intravenous BID    levETIRAcetam (Keppra) IV (PEDS and ADULTS)  500 mg Intravenous Q12H    mirtazapine  15 mg Oral QHS    mupirocin   Nasal BID       Current Intravenous Infusions   0.9% NaCl   Intravenous Continuous   Paused at 08/26/24 2033    0.9% NaCl   Intravenous Continuous   Stopped at 08/28/24 0921         Review of Systems   All other systems reviewed and are negative.         Objective:       Intake/Output Summary (Last 24 hours) at 8/29/2024 0932  Last data filed at 8/29/2024 0539  Gross per 24 hour   Intake 511.5 ml   Output 900 ml   Net -388.5 ml          Vital Signs (Most Recent):  Temp: 98 °F (36.7 °C) (08/29/24 0000)  Pulse: 89 (08/29/24 0500)  Resp: 19 (08/29/24 0500)  BP: (!) 144/71 (08/29/24 0500)  SpO2: 97 % (08/29/24 0500)  Body mass index is 28.03 kg/m².  Weight: 81 kg (178 lb 9.2 oz) Vital Signs (24h Range):  Temp:  [97 °F (36.1 °C)-98 °F (36.7 °C)] 98 °F (36.7 °C)  Pulse:  [53-89] 89  Resp:  [11-34] 19  SpO2:  [96 %-100 %] 97 %  BP: (119-144)/() 144/71     Physical Exam  Vitals and nursing note reviewed.   Constitutional:       Appearance: Normal appearance.   HENT:      Head: Normocephalic and atraumatic.      Mouth/Throat:      Mouth: Mucous membranes are moist.   Eyes:      Pupils: Pupils are equal, round, and reactive to light.   Cardiovascular:      Rate and Rhythm: Normal rate and regular rhythm.      Pulses: Normal pulses.      Heart sounds: No murmur heard.  Pulmonary:      Effort: No respiratory distress.      Breath sounds: No wheezing.   Abdominal:      General: Bowel sounds are normal.      Palpations: Abdomen is soft.      Tenderness: There is no abdominal tenderness.   Musculoskeletal:      Right lower leg: No edema.      Left lower leg: No edema.   Skin:     General: Skin is warm.      Capillary Refill: Capillary refill takes less than 2 seconds.   Neurological:      General: No focal deficit present.      Mental Status: He is oriented to person, place, and time.           Lines/Drains/Airways       Drain  Duration             Male External Urinary Catheter 08/26/24 1530 Other (Comment) 2 days              Peripheral Intravenous Line  Duration                  Peripheral IV - Single Lumen 08/26/24 1545 20 G 1 1/4 in Anterior;Distal;Left Forearm 2 days         Peripheral IV - Single Lumen 08/26/24 2215 22 G Anterior;Left Shoulder 2 days         Peripheral IV - Single Lumen 08/26/24 2230 20 G Anterior;Right Upper Arm 2 days                    Significant Labs:    Lab Results   Component Value Date    WBC 7.82 08/29/2024    HGB  "13.8 (L) 08/29/2024    HCT 40.0 (L) 08/29/2024    MCV 98.5 (H) 08/29/2024     08/29/2024           BMP  Lab Results   Component Value Date     08/29/2024    K 3.8 08/29/2024    CO2 18 (L) 08/29/2024    BUN 11.8 08/29/2024    CREATININE 0.86 08/29/2024    CALCIUM 9.3 08/29/2024    AGAP 12.0 08/29/2024         ABG  No results for input(s): "PH", "PO2", "PCO2", "HCO3", "POCBASEDEF" in the last 168 hours.      Significant Imaging:  I have reviewed the pertinent imaging within the past 24 hours.        Assessment/Plan:     Assessment  Stroke vs small vessel lacunar stroke vs breakthrough seizure  S/p TNK on 8/26/2024 around (11 to 12 pm)  S/p cerebral angiogram with no LVO or flow limiting stenosis  Hyperlipidemia  ; Total 213    Plan  He is completely back to his neuro baseline -- Ok to downgrade to neuro floor  Continue keppra for seizure prophylaxis  ASA and statin ordered     DVT Prophylaxis: SCDs  GI Prophylaxis: Famotidine     32 minutes of critical care was time spent personally by me on the following activities: development of treatment plan with patient or surrogate and bedside caregivers, discussions with consultants, evaluation of patient's response to treatment, examination of patient, ordering and performing treatments and interventions, ordering and review of laboratory studies, ordering and review of radiographic studies, pulse oximetry, re-evaluation of patient's condition.  This critical care time did not overlap with that of any other provider or involve time for any procedures.     ALANNA Arnold  Pulmonary Critical Care Medicine  [facility] - 7th Floor ICU  DOS: 08/29/2024     "

## 2024-08-30 LAB
25(OH)D3+25(OH)D2 SERPL-MCNC: 29 NG/ML (ref 30–80)
ALBUMIN SERPL-MCNC: 3.2 G/DL (ref 3.4–4.8)
ALBUMIN/GLOB SERPL: 1.2 RATIO (ref 1.1–2)
ALP SERPL-CCNC: 39 UNIT/L (ref 40–150)
ALT SERPL-CCNC: 52 UNIT/L (ref 0–55)
ANION GAP SERPL CALC-SCNC: 9 MEQ/L
AST SERPL-CCNC: 74 UNIT/L (ref 5–34)
BASOPHILS # BLD AUTO: 0.06 X10(3)/MCL
BASOPHILS NFR BLD AUTO: 1 %
BILIRUB SERPL-MCNC: 0.8 MG/DL
BUN SERPL-MCNC: 11.9 MG/DL (ref 8.4–25.7)
CALCIUM SERPL-MCNC: 9.4 MG/DL (ref 8.8–10)
CHLORIDE SERPL-SCNC: 114 MMOL/L (ref 98–107)
CO2 SERPL-SCNC: 22 MMOL/L (ref 23–31)
CREAT SERPL-MCNC: 0.87 MG/DL (ref 0.73–1.18)
CREAT/UREA NIT SERPL: 14
EOSINOPHIL # BLD AUTO: 0.16 X10(3)/MCL (ref 0–0.9)
EOSINOPHIL NFR BLD AUTO: 2.7 %
ERYTHROCYTE [DISTWIDTH] IN BLOOD BY AUTOMATED COUNT: 12.7 % (ref 11.5–17)
FOLATE SERPL-MCNC: 16.8 NG/ML (ref 7–31.4)
GFR SERPLBLD CREATININE-BSD FMLA CKD-EPI: >60 ML/MIN/1.73/M2
GLOBULIN SER-MCNC: 2.7 GM/DL (ref 2.4–3.5)
GLUCOSE SERPL-MCNC: 97 MG/DL (ref 82–115)
HCT VFR BLD AUTO: 39 % (ref 42–52)
HGB BLD-MCNC: 13.4 G/DL (ref 14–18)
IMM GRANULOCYTES # BLD AUTO: 0.03 X10(3)/MCL (ref 0–0.04)
IMM GRANULOCYTES NFR BLD AUTO: 0.5 %
LEFT CCA DIST DIAS: 12 CM/S
LEFT CCA DIST SYS: 73 CM/S
LEFT CCA PROX DIAS: 12 CM/S
LEFT CCA PROX SYS: 105 CM/S
LEFT ECA DIAS: 0 CM/S
LEFT ECA SYS: 70 CM/S
LEFT ICA DIST DIAS: 6 CM/S
LEFT ICA DIST SYS: 28 CM/S
LEFT ICA MID DIAS: 7 CM/S
LEFT ICA MID SYS: 44 CM/S
LEFT ICA PROX DIAS: 8 CM/S
LEFT ICA PROX SYS: 39 CM/S
LEFT VERTEBRAL DIAS: 8 CM/S
LEFT VERTEBRAL SYS: 39 CM/S
LYMPHOCYTES # BLD AUTO: 0.93 X10(3)/MCL (ref 0.6–4.6)
LYMPHOCYTES NFR BLD AUTO: 15.7 %
MAGNESIUM SERPL-MCNC: 1.9 MG/DL (ref 1.6–2.6)
MCH RBC QN AUTO: 33.8 PG (ref 27–31)
MCHC RBC AUTO-ENTMCNC: 34.4 G/DL (ref 33–36)
MCV RBC AUTO: 98.5 FL (ref 80–94)
MONOCYTES # BLD AUTO: 0.79 X10(3)/MCL (ref 0.1–1.3)
MONOCYTES NFR BLD AUTO: 13.3 %
NEUTROPHILS # BLD AUTO: 3.95 X10(3)/MCL (ref 2.1–9.2)
NEUTROPHILS NFR BLD AUTO: 66.8 %
NRBC BLD AUTO-RTO: 0 %
OHS CV CAROTID RIGHT ICA EDV HIGHEST: 10
OHS CV CAROTID ULTRASOUND LEFT ICA/CCA RATIO: 0.6
OHS CV CAROTID ULTRASOUND RIGHT ICA/CCA RATIO: 0.73
OHS CV PV CAROTID LEFT HIGHEST CCA: 105
OHS CV PV CAROTID LEFT HIGHEST ICA: 44
OHS CV PV CAROTID RIGHT HIGHEST CCA: 74
OHS CV PV CAROTID RIGHT HIGHEST ICA: 54
OHS CV US CAROTID LEFT HIGHEST EDV: 8
OHS QRS DURATION: 84 MS
OHS QTC CALCULATION: 421 MS
PLATELET # BLD AUTO: 179 X10(3)/MCL (ref 130–400)
PMV BLD AUTO: 11.6 FL (ref 7.4–10.4)
POTASSIUM SERPL-SCNC: 3.5 MMOL/L (ref 3.5–5.1)
PROT SERPL-MCNC: 5.9 GM/DL (ref 5.8–7.6)
RBC # BLD AUTO: 3.96 X10(6)/MCL (ref 4.7–6.1)
RIGHT CCA DIST DIAS: 9 CM/S
RIGHT CCA DIST SYS: 74 CM/S
RIGHT CCA PROX DIAS: 4 CM/S
RIGHT CCA PROX SYS: 58 CM/S
RIGHT ECA DIAS: 0 CM/S
RIGHT ECA SYS: 62 CM/S
RIGHT ICA DIST DIAS: 10 CM/S
RIGHT ICA DIST SYS: 54 CM/S
RIGHT ICA MID DIAS: 8 CM/S
RIGHT ICA MID SYS: 46 CM/S
RIGHT ICA PROX DIAS: 0 CM/S
RIGHT ICA PROX SYS: 28 CM/S
RIGHT VERTEBRAL DIAS: 5 CM/S
RIGHT VERTEBRAL SYS: 48 CM/S
SODIUM SERPL-SCNC: 145 MMOL/L (ref 136–145)
VIT B12 SERPL-MCNC: >2000 PG/ML (ref 213–816)
WBC # BLD AUTO: 5.92 X10(3)/MCL (ref 4.5–11.5)

## 2024-08-30 PROCEDURE — 92523 SPEECH SOUND LANG COMPREHEN: CPT

## 2024-08-30 PROCEDURE — 36415 COLL VENOUS BLD VENIPUNCTURE: CPT | Performed by: INTERNAL MEDICINE

## 2024-08-30 PROCEDURE — 85025 COMPLETE CBC W/AUTO DIFF WBC: CPT | Performed by: INTERNAL MEDICINE

## 2024-08-30 PROCEDURE — 97530 THERAPEUTIC ACTIVITIES: CPT | Mod: CQ

## 2024-08-30 PROCEDURE — 97116 GAIT TRAINING THERAPY: CPT | Mod: CQ

## 2024-08-30 PROCEDURE — 21400001 HC TELEMETRY ROOM

## 2024-08-30 PROCEDURE — 82306 VITAMIN D 25 HYDROXY: CPT | Performed by: INTERNAL MEDICINE

## 2024-08-30 PROCEDURE — 25000003 PHARM REV CODE 250: Performed by: NURSE PRACTITIONER

## 2024-08-30 PROCEDURE — 25000003 PHARM REV CODE 250

## 2024-08-30 PROCEDURE — 83735 ASSAY OF MAGNESIUM: CPT | Performed by: INTERNAL MEDICINE

## 2024-08-30 PROCEDURE — 80053 COMPREHEN METABOLIC PANEL: CPT | Performed by: INTERNAL MEDICINE

## 2024-08-30 PROCEDURE — 82607 VITAMIN B-12: CPT | Performed by: INTERNAL MEDICINE

## 2024-08-30 PROCEDURE — 82746 ASSAY OF FOLIC ACID SERUM: CPT | Performed by: INTERNAL MEDICINE

## 2024-08-30 PROCEDURE — 25000003 PHARM REV CODE 250: Performed by: INTERNAL MEDICINE

## 2024-08-30 RX ORDER — MIRTAZAPINE 15 MG/1
15 TABLET, FILM COATED ORAL NIGHTLY
Qty: 30 TABLET | Refills: 11 | Status: ON HOLD | OUTPATIENT
Start: 2024-08-30 | End: 2024-09-06

## 2024-08-30 RX ORDER — ACETAMINOPHEN 325 MG/1
650 TABLET ORAL EVERY 6 HOURS PRN
Status: DISCONTINUED | OUTPATIENT
Start: 2024-08-30 | End: 2024-09-03 | Stop reason: HOSPADM

## 2024-08-30 RX ORDER — LEVETIRACETAM 500 MG/1
500 TABLET ORAL 2 TIMES DAILY
Qty: 14 TABLET | Refills: 0 | Status: ON HOLD | OUTPATIENT
Start: 2024-08-30 | End: 2024-09-06

## 2024-08-30 RX ORDER — ATORVASTATIN CALCIUM 40 MG/1
40 TABLET, FILM COATED ORAL NIGHTLY
Qty: 90 TABLET | Refills: 3 | Status: ON HOLD | OUTPATIENT
Start: 2024-08-30 | End: 2024-09-06

## 2024-08-30 RX ORDER — TALC
6 POWDER (GRAM) TOPICAL NIGHTLY PRN
Status: DISCONTINUED | OUTPATIENT
Start: 2024-08-30 | End: 2024-09-03 | Stop reason: HOSPADM

## 2024-08-30 RX ORDER — ASPIRIN 81 MG/1
81 TABLET ORAL DAILY
Qty: 30 TABLET | Refills: 11 | Status: ON HOLD | OUTPATIENT
Start: 2024-08-31 | End: 2024-09-06

## 2024-08-30 RX ADMIN — LEVETIRACETAM 500 MG: 500 TABLET, FILM COATED ORAL at 08:08

## 2024-08-30 RX ADMIN — ATORVASTATIN CALCIUM 40 MG: 40 TABLET, FILM COATED ORAL at 08:08

## 2024-08-30 RX ADMIN — MUPIROCIN: 20 OINTMENT TOPICAL at 08:08

## 2024-08-30 RX ADMIN — FAMOTIDINE 20 MG: 20 TABLET, FILM COATED ORAL at 08:08

## 2024-08-30 RX ADMIN — ASPIRIN 81 MG: 81 TABLET, COATED ORAL at 08:08

## 2024-08-30 RX ADMIN — MIRTAZAPINE 15 MG: 15 TABLET, FILM COATED ORAL at 08:08

## 2024-08-30 RX ADMIN — ACETAMINOPHEN 325MG 650 MG: 325 TABLET ORAL at 08:08

## 2024-08-30 RX ADMIN — Medication 6 MG: at 08:08

## 2024-08-30 NOTE — NURSING
Nurses Note -- 4 Eyes      8/30/2024   0700      Skin assessed during: Q Shift Change      [x] No Altered Skin Integrity Present    [x]Prevention Measures Documented      [] Yes- Altered Skin Integrity Present or Discovered   [] LDA Added if Not in Epic (Describe Wound)   [] New Altered Skin Integrity was Present on Admit and Documented in LDA   [] Wound Image Taken    Wound Care Consulted? No    Attending Nurse:  Giselle Quintana RN/Staff Member:  MANUELITO Ward

## 2024-08-30 NOTE — PLAN OF CARE
Spoke to patient about recommendation of high intensity therapy and choices of inpatient therapy. He would like to discuss this with his family because he may want to go home. CM to follow up.

## 2024-08-30 NOTE — PT/OT/SLP EVAL
[facility]  Speech Language Pathology Department  Cognitive-Communication Evaluation    Patient Name:  Bi Whitley Jr.   MRN:  [id number]    Recommendations     General recommendations:  dysphagia therapy and further assessment of functional cognitive tasks upon transfer to rehab  Communication strategies:  go to room if call light pushed    Discharge therapy intensity: High Intensity Therapy  Barriers to safe discharge: level of skilled assistance needed    History     Bi Whitley Jr. is a/n 84 y.o. male admitted to ICU s/p cerebral angiogram after being transferred from an outside facility s/p IV thrombolytics for interventional neurology services after presenting with slurred speech, RUE weakness.     Past Medical History:   Diagnosis Date    Francois esophagus     Prostate cancer      Past Surgical History:   Procedure Laterality Date    APPENDECTOMY      RADICAL PROSTATECTOMY      TONSILLECTOMY         Previous level of Function  Education:doctorate (orthopedic surgeon)  Occupation: retired  Lives: with spouse  Handed: Right  Glasses: yes  Hearing Aids: no  Home Responsibilities: financial management, medication/health management, and meal preparation    Imaging   Results for orders placed during the hospital encounter of 08/26/24    MRI Brain Without Contrast    Narrative  EXAMINATION:  MRI BRAIN WITHOUT CONTRAST    CLINICAL HISTORY:  Stroke, follow up;    TECHNIQUE:  Routine unenhanced brain MRI.    COMPARISON:  CT yesterday.    FINDINGS:  Diffusion imaging is negative for acute infarct.  There is moderate patchy increased T2 and FLAIR signal in the cerebral white matter which is nonspecific but most commonly associated with chronic small vessel ischemic changes. There is moderate global atrophy.  Ventricles are not significantly enlarged    Signal voids are visible in the intracranial internal carotid arteries bilaterally and in the basilar artery implying gross patency.  Mild  paranasal sinus inflammation    Impression  No acute intracranial findings.      Electronically signed by: Mane Saeed  Date:    08/28/2024  Time:    11:05    Subjective     Patient awake, alert, and cooperative.  Patient goals: to go home   Spiritual/Cultural/Restoration Beliefs/Practices that affect care: no    Pain/Comfort: Pain Rating 1: 0/10    Respiratory Status:  room air    Objective     ORAL MUSCULATURE  Dentition: own teeth  Facial Movement: general weakness  Buccal Strength & Mobility: WFL  Mandibular Strength & Mobility: WFL  Oral Labial Strength & Mobility: WFL  Lingual Strength & Mobility: WFL    SPEECH PRODUCTION  Phoneme Production: adequate  Voice Quality: adequate  Voice Production: adequate  Speech Rate: appropriate  Loudness: acceptable  Respiration: WFL for speech  Resonance: adequate  Prosody: adequate  Speech Intelligibility  Known Context: Greater that 90%  Unknown Context: Greater that 90%    AUDITORY COMPREHENSION  Following Directions:  1-Step: 100%  2-Step: 100%  Yes/No Questions:  Biographical: 100%  Environmental: 100%  Simple: 100%  Complex: 100%    VERBAL EXPRESSION  Automatic Speech:  Functional needs: Within Functional Limits  Confrontation Naming  Objects: 100%  Wh- Questions:  Object name: 100%  Object function: 100%    COGNITION  Orientation:  Person: yes  Place: yes  Time: yes  Situation: inconsistent   Attention:  Focused: Within Functional Limits  Sustained: Within Functional Limits  Pragmatics:  Within Functional Limits  Memory:  Immediate: Within Functional Limits  Delayed: Within Functional Limits (12/12 Four Word Memory Task)  Long Term: Within Functional Limits  Problem Solving  Functional simple: Within Functional Limits  Executive Function:  Impaired    Assessment     Pt presents with functional speech/language and basic cognitive abilities.  Given previously high level of independent functioning, further assessment of functional cognitive abilities warranted upon  transfer to rehab.    Goals     Multidisciplinary Problems       SLP Goals          Problem: SLP    Goal Priority Disciplines Outcome   SLP Goal     SLP Progressing   Description:   LTG: Tolerate least restrictive PO diet with no clinical signs/sx aspiration  STGs:  1.  Complete base of tongue and laryngeal strengthening exercises with minimal cues  2.  Tolerate thermal stimulation to the anterior faucial pillars with 100% effortful swallow responses and delay less than 2 seconds.  3. Pt will tolerate 2 oz of ice chips by spoon with no clinical signs/sx aspiration.                        Patient Education     Patient provided with verbal education regarding SLP POC.  Understanding was verbalized.    Plan     SLP Follow-Up:  Yes   Patient to be seen:  5 x/week   Plan of Care expires:  08/30/24  Plan of Care reviewed with:  patient      Time Tracking     SLP Treatment Date:   08/30/24  Speech Start Time:  1500  Speech Stop Time:  1515     Speech Total Time (min):  15 min    Billable minutes:  Evaluation of Speech Sound Production with Comprehension and Expression, 15 minutes     08/30/2024

## 2024-08-30 NOTE — PLAN OF CARE
Spoke to patient's daughter and son. They are both wanting the patient to go to rehab. They would like [facility] rehab. Referral sent to [facility] and via [facility]. Waiting for medical clearance from MD and acceptance from rehab for possible transfer today.

## 2024-08-30 NOTE — PLAN OF CARE
Problem: Adult Inpatient Plan of Care  Goal: Plan of Care Review  Outcome: Progressing  Goal: Patient-Specific Goal (Individualized)  Outcome: Progressing  Goal: Absence of Hospital-Acquired Illness or Injury  Outcome: Progressing  Goal: Optimal Comfort and Wellbeing  Outcome: Progressing  Goal: Readiness for Transition of Care  Outcome: Progressing     Problem: Wound  Goal: Optimal Coping  Outcome: Progressing  Goal: Optimal Functional Ability  Outcome: Progressing  Goal: Absence of Infection Signs and Symptoms  Outcome: Progressing  Goal: Improved Oral Intake  Outcome: Progressing  Goal: Optimal Pain Control and Function  Outcome: Progressing  Goal: Skin Health and Integrity  Outcome: Progressing     Problem: Stroke, Ischemic (Includes Transient Ischemic Attack)  Goal: Optimal Coping  Outcome: Progressing  Goal: Safe and Effective Swallow  Outcome: Progressing     Problem: Fall Injury Risk  Goal: Absence of Fall and Fall-Related Injury  Outcome: Progressing

## 2024-08-30 NOTE — PT/OT/SLP PROGRESS
Physical Therapy Treatment    Patient Name:  Bi Whitley Jr.   MRN:  [id number]    Recommendations:     Discharge therapy intensity: High Intensity Therapy   Discharge Equipment Recommendations: none  Barriers to discharge: Impaired mobility and Ongoing medical needs    Assessment:     Bi Whitley Jr. is a 84 y.o. male admitted with a medical diagnosis of R weakness, seizure; s/p TNK; L MCA syndrome s/p cerebral angiogram.  He presents with the following impairments/functional limitations: weakness, gait instability, impaired endurance, impaired balance, impaired self care skills, impaired functional mobility, decreased coordination, decreased safety awareness, decreased lower extremity function.    Pt remains impulsive; nurse reporting he frequent attempts to get up. Pt currently on a chair alarm. Pt eager to mobilize and motivated to participate. Pt states he does not want to go to rehab and wishes to go home, son present requesting pt speak with family and they collectively decide. Tolerated session well however does demonstrate difficulty navigating around obstacles and balance deficits. Pt remains appropriate for high intensity therapy at d/c to decrease risk of falls and return to PLOF.     Rehab Prognosis: Good; patient would benefit from acute skilled PT services to address these deficits and reach maximum level of function.    Recent Surgery: * No procedures listed * 4 Days Post-Op    Plan:     During this hospitalization, patient would benefit from acute PT services 6 x/week to address the identified rehab impairments via gait training, therapeutic activities, therapeutic exercises, neuromuscular re-education and progress toward the following goals:    Plan of Care Expires:  09/27/24    Subjective     Chief Complaint: tired to sitting/laying down so much  Patient/Family Comments/goals: to go home  Pain/Comfort:  Pain Rating 1: 0/10      Objective:     Communicated with RN prior to session.  Patient found  up in chair with telemetry, chair check, Other (comments) (1:1) upon PT entry to room.     General Precautions: Standard, fall, aspiration  Orthopedic Precautions: N/A  Braces: N/A  Respiratory Status: Room air  Blood Pressure: 118/63  Skin Integrity: Visible skin intact      Functional Mobility:  Transfers:     Sit to Stand; CGA. Cues for hand placement to improve safety when returning to the chair  Gait: Pt ambulated x 130' with RW, min assist, intermittent cues to avoid obstacles with pt running into 3 obstacles, no major LOB but mildly unsteady.   Balance:   Alt marching x 15 reps with RW progressing to HHA x 1; min to mod assist  Alt cone taps x 15 reps with MEG UE support, pt reported inability to progress to single UE support due to poor balance    Therapeutic Activities/Exercises:    Education:  Patient and family were provided with verbal education education regarding PT role/goals/POC, fall prevention, and safety awareness.  Understanding was verbalized.     Patient left up in chair with all lines intact, call button in reach, chair alarm on, RN notified, and family present    GOALS:   Multidisciplinary Problems       Physical Therapy Goals          Problem: Physical Therapy    Goal Priority Disciplines Outcome Goal Variances Interventions   Physical Therapy Goal     PT, PT/OT Progressing     Description: Goals to be met by: 24     Patient will increase functional independence with mobility by performin. Supine to sit with Stand-by Assistance  2. Sit to supine with Stand-by Assistance  3. Sit to stand transfer with Stand-by Assistance  4. Gait  x 150 feet with Stand-by Assistance using Rolling Walker.                          Time Tracking:     PT Received On: 24  PT Start Time: 842     PT Stop Time: 905  PT Total Time (min): 23 min     Billable Minutes: Gait Training 1 unit and Therapeutic Activity 1 unit    Treatment Type: Treatment  PT/PTA: PTA     Number of PTA visits since last PT  visit: 3     08/30/2024

## 2024-08-31 LAB
GLUCOSE SERPL-MCNC: 101 MG/DL (ref 70–110)
POCT GLUCOSE: 106 MG/DL (ref 70–110)

## 2024-08-31 PROCEDURE — 92526 ORAL FUNCTION THERAPY: CPT

## 2024-08-31 PROCEDURE — 21400001 HC TELEMETRY ROOM

## 2024-08-31 PROCEDURE — 25000003 PHARM REV CODE 250: Performed by: NURSE PRACTITIONER

## 2024-08-31 PROCEDURE — 63600175 PHARM REV CODE 636 W HCPCS: Performed by: INTERNAL MEDICINE

## 2024-08-31 PROCEDURE — 25000003 PHARM REV CODE 250: Performed by: INTERNAL MEDICINE

## 2024-08-31 PROCEDURE — 99231 SBSQ HOSP IP/OBS SF/LOW 25: CPT | Mod: ,,, | Performed by: SPECIALIST

## 2024-08-31 PROCEDURE — 25000003 PHARM REV CODE 250

## 2024-08-31 RX ORDER — ENOXAPARIN SODIUM 100 MG/ML
40 INJECTION SUBCUTANEOUS EVERY 24 HOURS
Status: DISCONTINUED | OUTPATIENT
Start: 2024-08-31 | End: 2024-09-03 | Stop reason: HOSPADM

## 2024-08-31 RX ADMIN — FAMOTIDINE 20 MG: 20 TABLET, FILM COATED ORAL at 08:08

## 2024-08-31 RX ADMIN — ACETAMINOPHEN 325MG 650 MG: 325 TABLET ORAL at 08:08

## 2024-08-31 RX ADMIN — LEVETIRACETAM 500 MG: 500 TABLET, FILM COATED ORAL at 08:08

## 2024-08-31 RX ADMIN — ACETAMINOPHEN 325MG 650 MG: 325 TABLET ORAL at 09:08

## 2024-08-31 RX ADMIN — MIRTAZAPINE 15 MG: 15 TABLET, FILM COATED ORAL at 08:08

## 2024-08-31 RX ADMIN — MUPIROCIN: 20 OINTMENT TOPICAL at 09:08

## 2024-08-31 RX ADMIN — Medication 6 MG: at 08:08

## 2024-08-31 RX ADMIN — ASPIRIN 81 MG: 81 TABLET, COATED ORAL at 08:08

## 2024-08-31 RX ADMIN — ATORVASTATIN CALCIUM 40 MG: 40 TABLET, FILM COATED ORAL at 08:08

## 2024-08-31 RX ADMIN — ENOXAPARIN SODIUM 40 MG: 40 INJECTION SUBCUTANEOUS at 04:08

## 2024-08-31 NOTE — PROGRESS NOTES
Inpatient Nutrition Evaluation    Admit Date: 8/26/2024   Total duration of encounter: 5 days   Patient Age: 84 y.o.    Nutrition Recommendation/Prescription     Continue soft and bite sized diet, mildly thick liquids per SLP  Monitor labs, intake and weight    Nutrition Assessment     Chart Review    Reason Seen: length of stay    Malnutrition Screening Tool Results              Diagnosis:  Right-sided weakness status post TNK, resolved  ? Seizure activity- currently on keppra   Macrocytic anemia, stable    Relevant Medical History: Francois's esophagus, prostate cancer s/p prostatectomy    Scheduled Medications:  aspirin, 81 mg, Daily  atorvastatin, 40 mg, QHS  enoxparin, 40 mg, Q24H (prophylaxis, 1700)  famotidine, 20 mg, BID  levETIRAcetam, 500 mg, BID  mirtazapine, 15 mg, QHS  mupirocin, , BID    Continuous Infusions:   PRN Medications:   Current Facility-Administered Medications:     acetaminophen, 650 mg, Oral, Q6H PRN    bisacodyL, 10 mg, Rectal, Daily PRN    dextrose 10%, 12.5 g, Intravenous, PRN    dextrose 10%, 25 g, Intravenous, PRN    glucagon (human recombinant), 1 mg, Intramuscular, PRN    hydrALAZINE, 10 mg, Intravenous, Q6H PRN    labetalol, 10 mg, Intravenous, Q6H PRN    meclizine, 12.5 mg, Oral, TID PRN    melatonin, 6 mg, Oral, Nightly PRN    ondansetron, 4 mg, Intravenous, Q8H PRN    sodium chloride 0.9%, 10 mL, Intravenous, PRN    Recent Labs   Lab 08/26/24 2022 08/27/24  0229 08/28/24  0407 08/29/24  0419 08/30/24  0307   NA  --  143 144 144 145   K  --  3.8 3.8 3.8 3.5   CALCIUM  --  9.0 9.2 9.3 9.4   PHOS  --  1.5* 2.2* 2.2*  --    MG  --  1.80 2.00 1.80 1.90   CL  --  111* 114* 114* 114*   CO2  --  20* 21* 18* 22*   BUN  --  11.9 9.5 11.8 11.9   CREATININE  --  0.88 0.80 0.86 0.87   EGFRNORACEVR  --  >60 >60 >60 >60   GLUCOSE  --  101 93 78* 97   BILITOT  --  1.0 1.0 0.8 0.8   ALKPHOS  --  43 37* 42 39*   ALT  --  18 46 54 52   AST  --  35* 154* 117* 74*   ALBUMIN  --  3.8 3.3* 3.4 3.2*  "  TRIG  --  70  --   --   --    HGBA1C 5.1  --   --   --   --    WBC  --  11.35 6.97 7.82 5.92   HGB  --  14.0 13.3* 13.8* 13.4*   HCT  --  40.9* 37.5* 40.0* 39.0*     Nutrition Orders:  Diet Soft & Bite Sized (IDDSI Level 6) No Straws; Mildly Thick Liquids (IDDSI Level 2)      Appetite/Oral Intake: good/% of meals  Factors Affecting Nutritional Intake: difficulty/impaired swallowing  Food/Temple/Cultural Preferences: none reported  Food Allergies: no known food allergies  Last Bowel Movement: 24  Wound(s):      Comments    24: Pt reports good intake since and PTA, denies n/v/d/c. Last BM  noted. Denies unintentional weight loss. Weight appears stable per EMR weight history.    Anthropometrics    Height: 5' 6.93" (170 cm),    Last Weight: 81 kg (178 lb 9.2 oz) (24 0800), Weight Method: Stated  BMI (Calculated): 28  BMI Classification: overweight (BMI 25-29.9)        Ideal Body Weight (IBW), Male: 147.58 lb     % Ideal Body Weight, Male (lb): 121 %                 Usual Body Weight (UBW), k.5 kg  % Usual Body Weight: 102.1     Usual Weight Provided By: EMR weight history and patient denies unintentional weight loss    Wt Readings from Last 5 Encounters:   24 81 kg (178 lb 9.2 oz)   06/15/22 (P) 81 kg (178 lb 9.6 oz)     Weight Change(s) Since Admission:   Wt Readings from Last 1 Encounters:   24 0800 81 kg (178 lb 9.2 oz)   24 1348 81 kg (178 lb 9.2 oz)   Admit Weight: 81 kg (178 lb 9.2 oz) (24 1348), Weight Method: Stated    Patient Education     Not applicable.    Nutrition Goals & Monitoring     Dietitian will monitor: food and beverage intake, weight, electrolyte/renal panel, glucose/endocrine profile, and gastrointestinal profile    Nutrition Risk/Follow-Up: low (follow-up in 5-7 days)  Patients assigned 'low nutrition risk' status do not qualify for a full nutritional assessment but will be monitored and re-evaluated in a 5-7 day time period. Please " consult if re-evaluation needed sooner.

## 2024-08-31 NOTE — PROGRESS NOTES
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  Patient had no recollection of the events to hospital admission.  Patient was witnessed to have rhythmic jerking activity of upper extremity associated with facial twitching requiring Ativan.  He was given tPA and admitted to ICU for neuro checks.  He was also intubated for airway protection.  EEG was ordered and Keppra was continued due to suspicion for seizure activity.  Urgent Cerebral angiogram showed no evidence of large vessel occlusion or flow-limiting stenosis.  He was later extubated with no further issues.  Stroke workup was continued.  Bubble study was negative.  Carotid Doppler showed no significant stenosis. MRI brain showed no acute intracranial changes.  Psychiatry was consulted due to episodic confusion and they recommended adding Remeron bedtime.  Aspirin statin and Keppra were continued and patient is being downgraded to hospital medicine service for continuity of care.    His mental status returned to baseline but patient has no recollection of the events leading to hospital admission.  When seen at bedside he was alert, oriented, answers most of questions appropriately.  Physical exam was unremarkable and he has good strength with intact speech and no facial deficits.  Son was at bedside.  Just cleared by SLP for p.o. intake.        Interval Hx:   Patient is laying in bed, friend in his wife are at bedside.  No major complaints.  Reports his symptoms has improved a lot.  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  I  answered all their questions to the best of my knowledge and satisfaction  Case was discussed with patient's nurse in ICU    Objective/physical exam:  General: In no acute distress, afebrile, elderly male, pleasant  Chest: Clear to auscultation bilaterally anteriorly, on room air  Heart: RRR, +S1, S2, no appreciable murmur  Abdomen: Soft, nontender, BS +  MSK: Warm, no lower extremity edema, no clubbing or cyanosis  Neurologic: Alert and oriented x4, answered all my questions appropriately, able to move all 4 extremities    VITAL SIGNS: 24 HRS MIN & MAX LAST   Temp  Min: 98 °F (36.7 °C)  Max: 98.5 °F (36.9 °C) 98 °F (36.7 °C)   BP  Min: 109/72  Max: 125/72 109/72   Pulse  Min: 67  Max: 85  85   Resp  Min: 18  Max: 20 19   SpO2  Min: 95 %  Max: 98 % 98 %     I reviewed the labs below:  Recent Labs   Lab 08/28/24 0407 08/29/24 0419 08/30/24  0307   WBC 6.97 7.82 5.92   RBC 3.88* 4.06* 3.96*   HGB 13.3* 13.8* 13.4*   HCT 37.5* 40.0* 39.0*   MCV 96.6* 98.5* 98.5*   MCH 34.3* 34.0* 33.8*   MCHC 35.5 34.5 34.4   RDW 12.4 12.7 12.7    178 179   MPV 11.7* 11.7* 11.6*     Recent Labs   Lab 08/28/24 0407 08/29/24 0419 08/30/24  0307    144 145   K 3.8 3.8 3.5   * 114* 114*   CO2 21* 18* 22*   BUN 9.5 11.8 11.9   CREATININE 0.80 0.86 0.87   CALCIUM 9.2 9.3 9.4   MG 2.00 1.80 1.90   ALBUMIN 3.3* 3.4 3.2*   ALKPHOS 37* 42 39*   ALT 46 54 52   * 117* 74*   BILITOT 1.0 0.8 0.8     Microbiology Results (last 7 days)       ** No results found for the last 168 hours. **           See below for Radiology    Intake and Output:    Intake/Output Summary (Last 24 hours) at 8/31/2024 1206  Last data filed at 8/30/2024 1818  Gross per 24 hour   Intake 240 ml   Output --   Net 240 ml       Assessment/Plan:  Right-sided weakness S/P TNK 8/26 - resolved  ? Seizure activity- currently on keppra   Macrocytic anemia, stable  Francois's esophagus  Hospital delirium, Depressive disorder   Hx of prostate  cancer      Admitted to ICU on 8/26, downgraded to hospital medicine team on 8/29 8/26- TNK  8/28- MRI brain without contrast- negative   Neurology on board, recommended Keppra lifelong, driving not recommended  IV fluid discontinued, patient is consuming orally adequately  Continue ASA 81 mg daily and atorvastatin 40 mg HS  Continue soft and bite sized diet with mildly thickened liquid  Psych also evaluated the patient, continue Remeron 15 mg PO HS for insomnia/depression.  Recommended delirium precautions.  Psych has signed off  PT OT on board, recommended rehab  Case management on board, patient and family [facility].  Request sent.  Expected transfer Tuesday    VTE prophylaxis:  Lovenox            Patient condition:  Stable    Anticipated discharge and Disposition:  [facility] rehab Tuesday       All diagnosis and differential diagnosis have been reviewed; assessment and plan has been documented; I have personally reviewed the labs and test results that are presently available; I have reviewed the patients medication list; I have reviewed the consulting providers response and recommendations. I have reviewed or attempted to review medical records based upon their availability    All of the patient's questions have been  addressed and answered. Patient's is agreeable to the above stated plan. I will continue to monitor closely and make adjustments to medical management as needed.    Portions of this note dictated using EMR integrated voice recognition software, and may be subject to voice recognition errors not corrected at proofreading. Please contact writer for clarification if needed.   _____________________________________________________________________    Nutrition Status:  No RD assessment  A minimum of two characteristics is recommended for diagnosis of either severe or non-severe malnutrition.     Current Med:   aspirin  81 mg Oral Daily    atorvastatin  40 mg Oral QHS    enoxparin  40 mg  Subcutaneous Q24H (prophylaxis, 1700)    famotidine  20 mg Oral BID    levETIRAcetam  500 mg Oral BID    mirtazapine  15 mg Oral QHS    mupirocin   Nasal BID      PRN meds:  Current Facility-Administered Medications:     acetaminophen, 650 mg, Oral, Q6H PRN    bisacodyL, 10 mg, Rectal, Daily PRN    dextrose 10%, 12.5 g, Intravenous, PRN    dextrose 10%, 25 g, Intravenous, PRN    glucagon (human recombinant), 1 mg, Intramuscular, PRN    hydrALAZINE, 10 mg, Intravenous, Q6H PRN    labetalol, 10 mg, Intravenous, Q6H PRN    meclizine, 12.5 mg, Oral, TID PRN    melatonin, 6 mg, Oral, Nightly PRN    ondansetron, 4 mg, Intravenous, Q8H PRN    sodium chloride 0.9%, 10 mL, Intravenous, PRN    Continuous infusion:       Radiology:   I have personally reviewed the images and agree with radiologist report  CV Ultrasound Bilateral Doppler Carotid  The right internal carotid artery was patent with less than 50% stenosis.   The left internal carotid artery was patent with less than 50% stenosis.   Bilateral vertebral arteries were patent with antegrade flow.        Krishna Childs MD  Department of Hospital Medicine   [facility]   08/31/2024

## 2024-08-31 NOTE — PROGRESS NOTES
"Stable no recurr seiz and no behavioral issues reported   ...  Exam:   /69   Pulse 76   Temp 97.9 °F (36.6 °C)   Resp (!) 24   Ht 5' 6.93" (1.7 m)   Wt 81 kg (178 lb 9.2 oz)   SpO2 95%   BMI 28.03 kg/m²   Sitting in bed in no distress    CN's ok     Prob list:   New onset seizure     Plan/Rec's  I rec lifelong anticonvulsant and no driving   Signing off     "

## 2024-08-31 NOTE — NURSING
Nurses Note -- 4 Eyes      8/31/2024   7:46 AM      Skin assessed during: Daily Assessment      [x] No Altered Skin Integrity Present    [x]Prevention Measures Documented      [] Yes- Altered Skin Integrity Present or Discovered   [] LDA Added if Not in Epic (Describe Wound)   [] New Altered Skin Integrity was Present on Admit and Documented in LDA   [] Wound Image Taken    Wound Care Consulted? No    Attending Nurse:  Mya Quintana RN/Staff Member:  MANUELITO Phan

## 2024-08-31 NOTE — PROGRESS NOTES
[facility] Hospital Medicine Progress Note        Chief Complaint: seizure      Patient information was obtained from patient, patient's family, past medical records and ER records.      HISTORY OF PRESENT ILLNESS:   Bi Whitley Jr. is a 84 y.o. male with a past medical history of Francois's esophagus, prostate cancer status post radical prostatectomy, presented with acute onset progressively worsening right-sided weakness and possible seizure activity associated with slurred speech, not associated with bladder or bowel incontinence.  Patient had no recollection of the events to hospital admission.  Patient was witnessed to have rhythmic jerking activity of upper extremity associated with facial twitching requiring Ativan.  He was given tPA and admitted to ICU for neuro checks.  He was also intubated for airway protection.  EEG was ordered and Keppra was continued due to suspicion for seizure activity.  Urgent Cerebral angiogram showed no evidence of large vessel occlusion or flow-limiting stenosis.  He was later extubated with no further issues.  Stroke workup was continued.  Bubble study was negative.  Carotid Doppler showed no significant stenosis. MRI brain showed no acute intracranial changes.  Psychiatry was consulted due to episodic confusion and they recommended adding Remeron bedtime.  Aspirin statin and Keppra were continued and patient is being downgraded to hospital medicine service for continuity of care.       His mental status returned to baseline but patient has no recollection of the events leading to hospital admission.  When seen at bedside he was alert, oriented, answers most of questions appropriately.  Physical exam was unremarkable and he has good strength with intact speech and no facial deficits.  Son was at bedside.  Just cleared by SLP for p.o. intake.        Todays information  Patient seen and examined at bedside, family at bedside  Has no new complaints  remains impulsive  Asking to go home but PT rec is for rehab  Family wants rehab as well    Vitals reviewed and stable   No focal deficits   Mri brain negative for acute cva       Exam  General appearance: Well-developed, well-nourished male in no apparent distress.   HEENT: Atraumatic head. Moist mucous membranes of oral cavity.  Lungs: Clear to auscultation bilaterally.   Heart: Regular rate and rhythm.   Abdomen: Soft, non-distended, non-tender. Bowel sounds are normal.   Extremities: No cyanosis, clubbing. No deformities.  Skin: No Rash. Warm and dry.  Neuro: Awake, alert and oriented. 5/5 strength to all extremities. Normal speech.  Psych/mental status: Appropriate mood and affect. Cooperative. Responds appropriately to questions.      ASSESSMENT & PLAN:   Assessment:  Right-sided weakness status post TNK, resolved  ? Seizure activity- currently on keppra   Macrocytic anemia, stable  Francois's esophagus, prostate cancer     Plan:  D/c iv fluids  Continue asa , statin   Continue with physical, occupational and speech therapy  Continue with psych recommendations  check with neuro at [place] [facility] continuing keppra       VTE Prophylaxis: will be placed on SCD for DVT prophylaxis and will be advised to be as mobile as possible and sit in a chair as tolerated          VTE prophylaxis: sc lovenox     Patient condition:  Stable/Fair/Guarded/ Serious/ Critical    Anticipated discharge and Disposition:   rehab on tuesday      All diagnosis and differential diagnosis have been reviewed; assessment and plan has been documented; I have personally reviewed the labs and test results that are presently available; I have reviewed the patients medication list; I have reviewed the consulting providers response and recommendations. I have reviewed or attempted to review medical records based upon their availability    All of the patient's questions have been  addressed and answered. Patient's is agreeable to the above stated plan. I  will continue to monitor closely and make adjustments to medical management as needed.    Portions of this note dictated using EMR integrated voice recognition software, and may be subject to voice recognition errors not corrected at proofreading. Please contact writer for clarification if needed.     VITAL SIGNS: 24 HRS MIN & MAX LAST   Temp  Min: 97.7 °F (36.5 °C)  Max: 98.4 °F (36.9 °C) 98.4 °F (36.9 °C)   BP  Min: 102/63  Max: 116/68 110/71   Pulse  Min: 56  Max: 69  69   Resp  Min: 18  Max: 18 18   SpO2  Min: 93 %  Max: 97 % (!) 93 %     I have reviewed the following labs:  Recent Labs   Lab 08/12/24  0441 08/13/24  0433 08/14/24  0500   WBC 7.19 5.66 5.53   RBC 4.95 4.46* 4.39*   HGB 14.8 13.6* 13.4*   HCT 43.5 39.0* 38.4*   MCV 87.9 87.4 87.5   MCH 29.9 30.5 30.5   MCHC 34.0 34.9 34.9   RDW 12.8 12.7 12.7    206 200   MPV 9.0 9.0 9.4     Recent Labs   Lab 08/12/24 0441 08/13/24  0433 08/14/24  0500 08/15/24  0508   * 133* 132* 135*   K 3.5 3.4* 3.3* 4.1   CL 97* 101 98 101   CO2 24 22* 23 22*   BUN 20.0 19.6 16.7 15.4   CREATININE 1.05 1.00 0.99 0.92   CALCIUM 9.3 9.0 8.9 9.2   MG 2.60 2.10 1.80 2.00   ALBUMIN 3.6 3.4 3.3*  --    ALKPHOS 112 103 97  --    ALT 22 19 20  --    AST 24 20 25  --    BILITOT 0.8 0.7 0.7  --      Microbiology Results (last 7 days)       ** No results found for the last 168 hours. **          _____________________________________________________________________    Malnutrition Status:    Scheduled Med:   apixaban  5 mg Oral BID    aspirin  81 mg Oral Daily    atorvastatin  40 mg Oral QHS    diltiaZEM  360 mg Oral Daily    docusate sodium  50 mg Oral BID    ergocalciferol  50,000 Units Oral Q7 Days    guaiFENesin  600 mg Oral BID    insulin glargine U-100  15 Units Subcutaneous QHS    QUEtiapine  150 mg Oral Nightly      Continuous Infusions:     PRN Meds:    Current Facility-Administered Medications:     acetaminophen, 1,000 mg, Oral, Q6H PRN    aluminum-magnesium  hydroxide-simethicone, 30 mL, Oral, QID PRN    bisacodyL, 10 mg, Rectal, Daily PRN    dextrose 10%, 12.5 g, Intravenous, PRN    dextrose 10%, 25 g, Intravenous, PRN    glucagon (human recombinant), 1 mg, Intramuscular, PRN    glucose, 16 g, Oral, PRN    glucose, 24 g, Oral, PRN    guaiFENesin 100 mg/5 ml, 200 mg, Oral, Q4H PRN    HYDROcodone-acetaminophen, 1 tablet, Oral, Q4H PRN    insulin aspart U-100, 1-10 Units, Subcutaneous, QID (AC + HS) PRN    melatonin, 6 mg, Oral, Nightly PRN    naloxone, 0.02 mg, Intravenous, PRN    ondansetron, 4 mg, Intravenous, Q4H PRN    prochlorperazine, 5 mg, Intravenous, Q6H PRN    senna-docusate 8.6-50 mg, 1 tablet, Oral, BID PRN    sodium chloride 0.9%, 10 mL, Intravenous, PRN     Radiology:  I have personally reviewed the following imaging and agree with the radiologist.     Cardiac catheterization  Procedure performed in the Invasive Lab    - See Procedure Log link below for nursing documentation    - See OpNote on Surgeries Tab for physician findings    - See Imaging Tab for radiologist dictation      Kade Saldivar MD  Department of Hospital Medicine   [facility]   08/18/2024

## 2024-08-31 NOTE — PT/OT/SLP PROGRESS
[facility]  Speech Language Pathology Department  Dysphagia Therapy Progress Note    Patient Name:  Bi Whitley Jr.   MRN:  [id number]  Admitting Diagnosis: right sided weakness    Recommendations     General recommendations:  dysphagia therapy  Solid texture recommendation:  Soft & Bite Sized Diet - IDDSI Level 6  Liquid consistency recommendation: Mildly thick liquids - IDDSI Level 2   Medications: crushed in puree  Aspiration precautions: small bites/sips, slow rate, and alternate solids/liquids    Discharge therapy intensity: High Intensity Therapy     Subjective     Patient awake and alert.  Spiritual/Cultural/Voodoo Beliefs/Practices that affect care: no      Objective       Therapeutic Activities:  Pt completed base of tongue and laryngeal exercises x50 with minimal cues.  Pt tolerated thermal stimulation to the anterior faucial pillars x10 with 100 swallow responses.      Therapeutic PO Trials:  Consistency Amount Fed By Oral Symptoms Pharyngeal Symptoms   Ice chips 2oz SLP None None     Assessment     Pt continues to present with oropharyngeal dysphagia requiring diet modification to reduce the risk of aspiration.    Goals     Multidisciplinary Problems       SLP Goals          Problem: SLP    Goal Priority Disciplines Outcome   SLP Goal     SLP Progressing   Description:   LTG: Tolerate least restrictive PO diet with no clinical signs/sx aspiration  STGs:  1.  Complete base of tongue and laryngeal strengthening exercises with minimal cues  2.  Tolerate thermal stimulation to the anterior faucial pillars with 100% effortful swallow responses and delay less than 2 seconds.  3. Pt will tolerate 2 oz of ice chips by spoon with no clinical signs/sx aspiration.                        Patient Education     Patient provided with verbal education regarding POC.  Understanding was verbalized.    Plan     Will continue to follow and tx as appropriate.    SLP Follow-Up:  Yes   Patient  to be seen:  5 x/week   Plan of Care expires:  08/30/24  Plan of Care reviewed with:  patient       Time Tracking     SLP Treatment Date:   08/31/24  Speech Start Time:  1050  Speech Stop Time:  1100     Speech Total Time (min):  10 min    Billable minutes:  Treatment of Swallow Dysfunction, 10 minutes       08/31/2024

## 2024-09-01 PROCEDURE — 63600175 PHARM REV CODE 636 W HCPCS: Performed by: INTERNAL MEDICINE

## 2024-09-01 PROCEDURE — 97110 THERAPEUTIC EXERCISES: CPT | Mod: CQ

## 2024-09-01 PROCEDURE — 11000001 HC ACUTE MED/SURG PRIVATE ROOM

## 2024-09-01 PROCEDURE — 25000003 PHARM REV CODE 250: Performed by: INTERNAL MEDICINE

## 2024-09-01 PROCEDURE — 97112 NEUROMUSCULAR REEDUCATION: CPT | Mod: CO

## 2024-09-01 PROCEDURE — 97116 GAIT TRAINING THERAPY: CPT | Mod: CQ

## 2024-09-01 PROCEDURE — 25000003 PHARM REV CODE 250: Performed by: NURSE PRACTITIONER

## 2024-09-01 PROCEDURE — 25000003 PHARM REV CODE 250

## 2024-09-01 PROCEDURE — 92526 ORAL FUNCTION THERAPY: CPT

## 2024-09-01 RX ORDER — SENNOSIDES 8.6 MG/1
8.6 TABLET ORAL DAILY PRN
Status: DISCONTINUED | OUTPATIENT
Start: 2024-09-01 | End: 2024-09-03 | Stop reason: HOSPADM

## 2024-09-01 RX ORDER — SYRING-NEEDL,DISP,INSUL,0.3 ML 29 G X1/2"
296 SYRINGE, EMPTY DISPOSABLE MISCELLANEOUS ONCE
Status: COMPLETED | OUTPATIENT
Start: 2024-09-01 | End: 2024-09-01

## 2024-09-01 RX ADMIN — BISACODYL 10 MG: 10 SUPPOSITORY RECTAL at 01:09

## 2024-09-01 RX ADMIN — ASPIRIN 81 MG: 81 TABLET, COATED ORAL at 08:09

## 2024-09-01 RX ADMIN — ATORVASTATIN CALCIUM 40 MG: 40 TABLET, FILM COATED ORAL at 09:09

## 2024-09-01 RX ADMIN — MUPIROCIN: 20 OINTMENT TOPICAL at 08:09

## 2024-09-01 RX ADMIN — ENOXAPARIN SODIUM 40 MG: 40 INJECTION SUBCUTANEOUS at 04:09

## 2024-09-01 RX ADMIN — MAGNESIUM CITRATE 296 ML: 1.75 LIQUID ORAL at 03:09

## 2024-09-01 RX ADMIN — FAMOTIDINE 20 MG: 20 TABLET, FILM COATED ORAL at 09:09

## 2024-09-01 RX ADMIN — MUPIROCIN: 20 OINTMENT TOPICAL at 09:09

## 2024-09-01 RX ADMIN — FAMOTIDINE 20 MG: 20 TABLET, FILM COATED ORAL at 08:09

## 2024-09-01 RX ADMIN — LEVETIRACETAM 500 MG: 500 TABLET, FILM COATED ORAL at 08:09

## 2024-09-01 RX ADMIN — LEVETIRACETAM 500 MG: 500 TABLET, FILM COATED ORAL at 09:09

## 2024-09-01 RX ADMIN — MIRTAZAPINE 15 MG: 15 TABLET, FILM COATED ORAL at 09:09

## 2024-09-01 RX ADMIN — SENNOSIDES 8.6 MG: 8.6 TABLET, FILM COATED ORAL at 01:09

## 2024-09-01 NOTE — PT/OT/SLP PROGRESS
Occupational Therapy   Treatment    Name: Bi Whitley Jr.  MRN: [id number]  Admitting Diagnosis:  Right sided weakness  6 Days Post-Op    Recommendations:     Recommended therapy intensity at discharge: High Intensity Therapy (pending progress)   Discharge Equipment Recommendations:  walker, rolling  Barriers to discharge:       Assessment:     Bi Whitley Jr. is a 84 y.o. male with a medical diagnosis of Right sided weakness.  He presents with willingness to participate in skilled OT; mild impulsivity and mixed safety. Performance deficits affecting function are weakness, impaired endurance, impaired self care skills, impaired functional mobility, gait instability, impaired balance, decreased safety awareness, decreased lower extremity function, decreased upper extremity function.     Rehab Prognosis:  Good; patient would benefit from acute skilled OT services to address these deficits and reach maximum level of function.       Plan:     Patient to be seen 6 x/week to address the above listed problems via self-care/home management, therapeutic activities, therapeutic exercises, neuromuscular re-education  Plan of Care Expires: 09/27/24  Plan of Care Reviewed with: patient, family    Subjective     Pain/Comfort:  Pain Rating 1: 0/10    Objective:     Communicated with: Nurse prior to session.  Patient found ambulatory in room/marsh with  PCA, upon OT entry to room.    General Precautions: Standard, aspiration (SBP<180)    Orthopedic Precautions:N/A  Braces: N/A  Respiratory Status: Room air  Vital Signs: Blood Pressure: 119/79     Occupational Performance:     Bed Mobility:    Patient completed Scooting/Bridging with supervision  Patient completed Sit to Supine with supervision, due to speed    Functional Mobility/Transfers:  Patient completed Sit <> Stand Transfer with supervision  with  no assistive device and rolling walker  (2 trials)  Functional Mobility: Patient uses RW with supervision- CGA to avoid obstacles  "while ambulating within room and down halls    Activities of Daily Living:  Upon entry, patient is exiting bathroom with PCA, after toileting, allwith CGA. PCA reports that patient "likes to walk fast".  Patient demo ability to cross legs to perform LB dsg and footwear don/doff    Neuromuscular Re-education:    - Patient reports minimal physical deficits, but minor issues "with the proprioception of my (R) arm. I have to focus when I'm really reaching for things."   - He performs several tasks to display his UE coord:   - unilateral finger-to-nose taps with ease and good precision   - (B) GM ROM via symmetrical and asymmetrical hand clap patterns    - (B) simultaneous digit-thumb opposition  - Patient ambulates down marsh while using multiple skills, including dynamic balance with RW, visual scanning, obstacle navigation, etc. while playing modified version of "I Spy". 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   -Upon return to room, he requests to ambulate with no AD; he does so with more of a shuffled gait and uses nearby furniture for occasional support. He chooses to return to bed; supervision only.  -Patient reports he is supposed to transfer to rehab on Tuesday.         Patient Education:  Patient provided with verbal education and demonstrations education regarding OT role/goals/POC, safety awareness, and call bell use .  Understanding was verbalized.      Patient left HOB elevated with all lines intact, call button in reach, and PCA present.    GOALS:   Multidisciplinary Problems       Occupational Therapy Goals          Problem: Occupational Therapy    Goal Priority Disciplines Outcome Interventions   Occupational Therapy Goal     OT, PT/OT Progressing    Description: LTG: Pt will perform basic ADLs and ADL transfers with Modified independence using LRAD by discharge.    STG: to be met by 9/27/24    Pt will complete grooming standing at " sink with LRAD with SBA.  Pt will complete UB dressing with SBA.  Pt will complete LB dressing with SBA using LRAD.  Pt will complete toileting with SBA using LRAD.  Pt will complete functional mobility to/from toilet and toilet transfer with SBA using LRAD.                        Time Tracking:     OT Date of Treatment: 09/01/24  OT Start Time: 1010  OT Stop Time: 1029  OT Total Time (min): 19 min    Billable Minutes:Neuromuscular Re-education 19    OT/LINETTE: LINETTE     Number of LINETTE visits since last OT visit: 2    9/1/2024

## 2024-09-01 NOTE — PROGRESS NOTES
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  Patient had no recollection of the events to hospital admission.  Patient was witnessed to have rhythmic jerking activity of upper extremity associated with facial twitching requiring Ativan.  He was given tPA and admitted to ICU for neuro checks.  He was also intubated for airway protection.  EEG was ordered and Keppra was continued due to suspicion for seizure activity.  Urgent Cerebral angiogram showed no evidence of large vessel occlusion or flow-limiting stenosis.  He was later extubated with no further issues.  Stroke workup was continued.  Bubble study was negative.  Carotid Doppler showed no significant stenosis. MRI brain showed no acute intracranial changes.  Psychiatry was consulted due to episodic confusion and they recommended adding Remeron bedtime.  Aspirin statin and Keppra were continued and patient is being downgraded to hospital medicine service for continuity of care.    His mental status returned to baseline but patient has no recollection of the events leading to hospital admission.  When seen at bedside he was alert, oriented, answers most of questions appropriately.  Physical exam was unremarkable and he has good strength with intact speech and no facial deficits.  Son was at bedside.  Just cleared by SLP for p.o. intake.        Interval Hx:   Patient is sleeping comfortably but easily awakened.  Reports had a rough night given 1 of the patient was aggressive last night in the same hallway.  Worked with  therapy this morning now taking his morning nap reports pain on the left area below the ribs  Friend is at bedside  Case was discussed with patient's nurse in ICU, informed me of overnight events    Objective/physical exam:  General: In no acute distress, afebrile, elderly male, pleasant  Chest: Clear to auscultation bilaterally anteriorly, on room air  Heart: RRR, +S1, S2, no appreciable murmur  Abdomen: Soft, nontender, BS +  MSK: Warm, no lower extremity edema, no clubbing or cyanosis  Neurologic: Alert and oriented x4, answered all my questions appropriately, able to move all 4 extremities    VITAL SIGNS: 24 HRS MIN & MAX LAST   Temp  Min: 97.6 °F (36.4 °C)  Max: 98.2 °F (36.8 °C) 97.6 °F (36.4 °C)   BP  Min: 105/66  Max: 134/63 105/66   Pulse  Min: 70  Max: 95  93   Resp  Min: 16  Max: 24 (!) 24   SpO2  Min: 95 %  Max: 96 % 95 %     I reviewed the labs below:  Recent Labs   Lab 08/28/24 0407 08/29/24 0419 08/30/24  0307   WBC 6.97 7.82 5.92   RBC 3.88* 4.06* 3.96*   HGB 13.3* 13.8* 13.4*   HCT 37.5* 40.0* 39.0*   MCV 96.6* 98.5* 98.5*   MCH 34.3* 34.0* 33.8*   MCHC 35.5 34.5 34.4   RDW 12.4 12.7 12.7    178 179   MPV 11.7* 11.7* 11.6*     Recent Labs   Lab 08/28/24 0407 08/29/24 0419 08/30/24  0307    144 145   K 3.8 3.8 3.5   * 114* 114*   CO2 21* 18* 22*   BUN 9.5 11.8 11.9   CREATININE 0.80 0.86 0.87   CALCIUM 9.2 9.3 9.4   MG 2.00 1.80 1.90   ALBUMIN 3.3* 3.4 3.2*   ALKPHOS 37* 42 39*   ALT 46 54 52   * 117* 74*   BILITOT 1.0 0.8 0.8     Microbiology Results (last 7 days)       ** No results found for the last 168 hours. **           See below for Radiology    Intake and Output:  No intake or output data in the 24 hours ending 09/01/24 1009      Assessment/Plan:  Right-sided weakness S/P TNK 8/26 - resolved  ? Seizure activity- continue keppra   Macrocytic anemia, stable  Francois's esophagus  Hospital delirium, Depressive disorder   Hx of prostate cancer      Admitted to ICU on  8/26, downgraded to hospital medicine team on 8/29 8/26- s/p TNK  8/28- MRI brain without contrast- negative   Neurology on board, recommended Keppra lifelong, driving not recommended  Continue ASA 81 mg daily and atorvastatin 40 mg HS  Continue soft and bite sized diet with mildly thickened liquid  IV fluid discontinued, patient is consuming orally adequately  Psych also evaluated the patient, continue Remeron 15 mg PO HS for insomnia/depression.  Recommended delirium precautions.  Psych has signed off  PT OT on board, recommended rehab  Case management on board, patient and [facility]  Request sent.  Expected transfer Tuesday  Morning CBC, CMP, Mag ordered    VTE prophylaxis:  Lovenox            Patient condition:  Stable    Anticipated discharge and Disposition:  LGO rehab hopefully Tuesday       All diagnosis and differential diagnosis have been reviewed; assessment and plan has been documented; I have personally reviewed the labs and test results that are presently available; I have reviewed the patients medication list; I have reviewed the consulting providers response and recommendations. I have reviewed or attempted to review medical records based upon their availability    All of the patient's questions have been  addressed and answered. Patient's is agreeable to the above stated plan. I will continue to monitor closely and make adjustments to medical management as needed.    Portions of this note dictated using EMR integrated voice recognition software, and may be subject to voice recognition errors not corrected at proofreading. Please contact writer for clarification if needed.   _____________________________________________________________________    Nutrition Status:  No RD assessment  A minimum of two characteristics is recommended for diagnosis of either severe or non-severe malnutrition.     Current Med:   aspirin  81 mg Oral Daily    atorvastatin  40 mg Oral QHS     enoxparin  40 mg Subcutaneous Q24H (prophylaxis, 1700)    famotidine  20 mg Oral BID    levETIRAcetam  500 mg Oral BID    mirtazapine  15 mg Oral QHS    mupirocin   Nasal BID      PRN meds:  Current Facility-Administered Medications:     acetaminophen, 650 mg, Oral, Q6H PRN    bisacodyL, 10 mg, Rectal, Daily PRN    dextrose 10%, 12.5 g, Intravenous, PRN    dextrose 10%, 25 g, Intravenous, PRN    glucagon (human recombinant), 1 mg, Intramuscular, PRN    hydrALAZINE, 10 mg, Intravenous, Q6H PRN    labetalol, 10 mg, Intravenous, Q6H PRN    meclizine, 12.5 mg, Oral, TID PRN    melatonin, 6 mg, Oral, Nightly PRN    ondansetron, 4 mg, Intravenous, Q8H PRN    sodium chloride 0.9%, 10 mL, Intravenous, PRN    Continuous infusion:       Radiology:   I have personally reviewed the images and agree with radiologist report  CV Ultrasound Bilateral Doppler Carotid  The right internal carotid artery was patent with less than 50% stenosis.   The left internal carotid artery was patent with less than 50% stenosis.   Bilateral vertebral arteries were patent with antegrade flow.        Krishna Childs MD  Department of Hospital Medicine   [facility]   09/01/2024

## 2024-09-01 NOTE — PT/OT/SLP PROGRESS
[facility]  Spe[facility] Pathology Department  Dysphagia Therapy Progress Note    Patient Name:  Bi Whitley Jr.   MRN:  [id number]  Admitting Diagnosis: right sided weakness     Recommendations     General recommendations:  dysphagia therapy  Solid texture recommendation:  Soft & Bite Sized Diet - IDDSI Level 6  Liquid consistency recommendation: Mildly thick liquids - IDDSI Level 2   Medications: crushed in puree  Aspiration precautions: small bites/sips, slow rate, and alternate solids/liquids     Discharge therapy intensity: High Intensity Therapy     Subjective     Patient awake, alert, and cooperative.  Spiritual/Cultural/Spiritism Beliefs/Practices that affect care: no    Pain/Comfort: Pain Rating 1: 0/10    Respiratory Status:  room air    Objective       Therapeutic Activities:  Pt completed laryngeal exercises x30 with minimal cues.  Completed effortful swallow with ice chips x10; good toleration.    Therapeutic PO Trials:  Consistency Amount Fed By Oral Symptoms Pharyngeal Symptoms   Ice chips 1/2 cup Self None None     Assessment     Pt continues to present with oropharyngeal dysphagia requiring diet modification to reduce the risk of aspiration.    Goals     Multidisciplinary Problems       SLP Goals          Problem: SLP    Goal Priority Disciplines Outcome   SLP Goal     SLP Progressing   Description:   LTG: Tolerate least restrictive PO diet with no clinical signs/sx aspiration  STGs:  1.  Complete base of tongue and laryngeal strengthening exercises with minimal cues  2.  Tolerate thermal stimulation to the anterior faucial pillars with 100% effortful swallow responses and delay less than 2 seconds.  3. Pt will tolerate 2 oz of ice chips by spoon with no clinical signs/sx aspiration.                        Patient Education     Patient provided with verbal education regarding swallow function.  Understanding was verbalized.    Plan     Will continue to follow and tx  as appropriate.  Per pt, MBS to be repeated Tuesday.    SLP Follow-Up:  Yes   Patient to be seen:  5 x/week   Plan of Care expires:  08/30/24  Plan of Care reviewed with:  patient       Time Tracking     SLP Treatment Date:   09/01/24  Speech Start Time:  1105  Speech Stop Time:  1120     Speech Total Time (min):  15 min    Billable minutes:  Treatment of Swallow Dysfunction, 15 minutes       09/01/2024

## 2024-09-01 NOTE — PT/OT/SLP PROGRESS
Physical Therapy Treatment    Patient Name:  Bi Whitley Jr.   MRN:  [id number]    Recommendations:     Discharge therapy intensity: High Intensity Therapy   Discharge Equipment Recommendations: none  Barriers to discharge: Decreased caregiver support, Impaired mobility, and Ongoing medical needs    Assessment:     Bi Whitley Jr. is a 84 y.o. male.  He presents with the following impairments/functional limitations: weakness, gait instability, impaired endurance, impaired balance, impaired self care skills, impaired functional mobility, decreased coordination, decreased safety awareness, decreased lower extremity function.    Rehab Prognosis: Good; patient would benefit from acute skilled PT services to address these deficits and reach maximum level of function.    Recent Surgery: * No procedures listed * 6 Days Post-Op    Plan:     During this hospitalization, patient would benefit from acute PT services 6 x/week to address the identified rehab impairments via gait training, therapeutic activities, therapeutic exercises, neuromuscular re-education and progress toward the following goals:    Plan of Care Expires:  09/27/24    Subjective     Chief Complaint: I had a bad night.     Objective:     Communicated with nurse prior to session.  Patient found  bathroom with CNA  with telemetry, chair check, Other (comments) (1:1) upon PT entry to room.     General Precautions: Standard, fall, aspiration  Orthopedic Precautions: N/A  Braces: N/A  Respiratory Status: Room air  Blood Pressure:   Skin Integrity: Visible skin intact    Functional Mobility:  Min assist with transfers and SBA STS  Gait 125 ft RW and 125 ft without AD min assist.   Pt performed dynamic balance activities to improve righting reactions  to prevent LOB/falls.   Pt performed LE PRE's to increase strenth, ROM, and endurance to improve overall independence.     Education Provided:  Role and goals of PT, transfer training, bed mobility, gait training,  balance training, safety awareness, assistive device, strengthening exercises, and importance of participating in PT to return to PLOF.      Patient left up in chair with all lines intact and call button in reach    GOALS:   Multidisciplinary Problems       Physical Therapy Goals          Problem: Physical Therapy    Goal Priority Disciplines Outcome Goal Variances Interventions   Physical Therapy Goal     PT, PT/OT Progressing     Description: Goals to be met by: 24     Patient will increase functional independence with mobility by performin. Supine to sit with Stand-by Assistance  2. Sit to supine with Stand-by Assistance  3. Sit to stand transfer with Stand-by Assistance  4. Gait  x 150 feet with Stand-by Assistance using Rolling Walker.                          Time Tracking:       Billable Minutes: Gait Training 12 and Therapeutic Exercise 12    Treatment Type: Treatment  PT/PTA: PTA     Number of PTA visits since last PT visit: 4     2024

## 2024-09-01 NOTE — NURSING
Nurses Note -- 4 Eyes      9/1/2024   9:56 AM      Skin assessed during: Daily Assessment      [x] No Altered Skin Integrity Present    [x]Prevention Measures Documented      [] Yes- Altered Skin Integrity Present or Discovered   [] LDA Added if Not in Epic (Describe Wound)   [] New Altered Skin Integrity was Present on Admit and Documented in LDA   [] Wound Image Taken    Wound Care Consulted? No    Attending Nurse:  May Quintana RN/Staff Member:  MANUELITO Ashley

## 2024-09-01 NOTE — NURSING
Nurses Note -- 4 Eyes      9/1/2024   3:39 PM      Skin assessed during: Transfer      [x] No Altered Skin Integrity Present    []Prevention Measures Documented      [] Yes- Altered Skin Integrity Present or Discovered   [] LDA Added if Not in Epic (Describe Wound)   [] New Altered Skin Integrity was Present on Admit and Documented in LDA   [] Wound Image Taken    Wound Care Consulted? No    Attending Nurse:  Lula Quintana RN/Staff Member:  MANUELITO Olvera

## 2024-09-02 LAB
ALBUMIN SERPL-MCNC: 3.4 G/DL (ref 3.4–4.8)
ALBUMIN/GLOB SERPL: 1.3 RATIO (ref 1.1–2)
ALP SERPL-CCNC: 41 UNIT/L (ref 40–150)
ALT SERPL-CCNC: 37 UNIT/L (ref 0–55)
ANION GAP SERPL CALC-SCNC: 9 MEQ/L
AST SERPL-CCNC: 26 UNIT/L (ref 5–34)
BILIRUB SERPL-MCNC: 0.8 MG/DL
BUN SERPL-MCNC: 14 MG/DL (ref 8.4–25.7)
CALCIUM SERPL-MCNC: 9.8 MG/DL (ref 8.8–10)
CHLORIDE SERPL-SCNC: 108 MMOL/L (ref 98–107)
CO2 SERPL-SCNC: 29 MMOL/L (ref 23–31)
CREAT SERPL-MCNC: 1.08 MG/DL (ref 0.73–1.18)
CREAT/UREA NIT SERPL: 13
ERYTHROCYTE [DISTWIDTH] IN BLOOD BY AUTOMATED COUNT: 12.8 % (ref 11.5–17)
GFR SERPLBLD CREATININE-BSD FMLA CKD-EPI: >60 ML/MIN/1.73/M2
GLOBULIN SER-MCNC: 2.7 GM/DL (ref 2.4–3.5)
GLUCOSE SERPL-MCNC: 108 MG/DL (ref 82–115)
HCT VFR BLD AUTO: 41.9 % (ref 42–52)
HGB BLD-MCNC: 13.8 G/DL (ref 14–18)
MAGNESIUM SERPL-MCNC: 2.4 MG/DL (ref 1.6–2.6)
MCH RBC QN AUTO: 33.5 PG (ref 27–31)
MCHC RBC AUTO-ENTMCNC: 32.9 G/DL (ref 33–36)
MCV RBC AUTO: 101.7 FL (ref 80–94)
NRBC BLD AUTO-RTO: 0 %
PLATELET # BLD AUTO: 231 X10(3)/MCL (ref 130–400)
PMV BLD AUTO: 11.7 FL (ref 7.4–10.4)
POCT GLUCOSE: 101 MG/DL (ref 70–110)
POCT GLUCOSE: 101 MG/DL (ref 70–110)
POCT GLUCOSE: 106 MG/DL (ref 70–110)
POCT GLUCOSE: 130 MG/DL (ref 70–110)
POTASSIUM SERPL-SCNC: 4.6 MMOL/L (ref 3.5–5.1)
PROT SERPL-MCNC: 6.1 GM/DL (ref 5.8–7.6)
RBC # BLD AUTO: 4.12 X10(6)/MCL (ref 4.7–6.1)
SODIUM SERPL-SCNC: 146 MMOL/L (ref 136–145)
WBC # BLD AUTO: 8.33 X10(3)/MCL (ref 4.5–11.5)

## 2024-09-02 PROCEDURE — 36415 COLL VENOUS BLD VENIPUNCTURE: CPT | Performed by: INTERNAL MEDICINE

## 2024-09-02 PROCEDURE — 11000001 HC ACUTE MED/SURG PRIVATE ROOM

## 2024-09-02 PROCEDURE — 80053 COMPREHEN METABOLIC PANEL: CPT | Performed by: INTERNAL MEDICINE

## 2024-09-02 PROCEDURE — 63600175 PHARM REV CODE 636 W HCPCS: Performed by: INTERNAL MEDICINE

## 2024-09-02 PROCEDURE — 25000003 PHARM REV CODE 250

## 2024-09-02 PROCEDURE — 25000003 PHARM REV CODE 250: Performed by: NURSE PRACTITIONER

## 2024-09-02 PROCEDURE — 83735 ASSAY OF MAGNESIUM: CPT | Performed by: INTERNAL MEDICINE

## 2024-09-02 PROCEDURE — 85027 COMPLETE CBC AUTOMATED: CPT | Performed by: INTERNAL MEDICINE

## 2024-09-02 PROCEDURE — 25000003 PHARM REV CODE 250: Performed by: INTERNAL MEDICINE

## 2024-09-02 RX ADMIN — ACETAMINOPHEN 325MG 650 MG: 325 TABLET ORAL at 07:09

## 2024-09-02 RX ADMIN — FAMOTIDINE 20 MG: 20 TABLET, FILM COATED ORAL at 08:09

## 2024-09-02 RX ADMIN — ENOXAPARIN SODIUM 40 MG: 40 INJECTION SUBCUTANEOUS at 05:09

## 2024-09-02 RX ADMIN — ASPIRIN 81 MG: 81 TABLET, COATED ORAL at 08:09

## 2024-09-02 RX ADMIN — LEVETIRACETAM 500 MG: 500 TABLET, FILM COATED ORAL at 08:09

## 2024-09-02 RX ADMIN — ACETAMINOPHEN 325MG 650 MG: 325 TABLET ORAL at 08:09

## 2024-09-02 RX ADMIN — ATORVASTATIN CALCIUM 40 MG: 40 TABLET, FILM COATED ORAL at 08:09

## 2024-09-02 RX ADMIN — Medication 6 MG: at 08:09

## 2024-09-02 RX ADMIN — MIRTAZAPINE 15 MG: 15 TABLET, FILM COATED ORAL at 08:09

## 2024-09-02 NOTE — PT/OT/SLP PROGRESS
Physical Therapy      Patient Name:  Bi Whitley .   MRN:  [id number]    Patient not seen today secondary to pt declining 2/2 pt did not get a good nights rest last night and requested to rest. Will follow-up as schedule allows.

## 2024-09-02 NOTE — NURSING
Nurses Note -- 4 Eyes      9/1/2024   8:22 PM      Skin assessed during: Q Shift Change      [x] No Altered Skin Integrity Present    []Prevention Measures Documented      [] Yes- Altered Skin Integrity Present or Discovered   [] LDA Added if Not in Epic (Describe Wound)   [] New Altered Skin Integrity was Present on Admit and Documented in LDA   [] Wound Image Taken    Wound Care Consulted? No    Attending Nurse:  Jordan Quintana RN/Staff Member:  Martina

## 2024-09-02 NOTE — PROGRESS NOTES
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  Patient had no recollection of the events to hospital admission.  Patient was witnessed to have rhythmic jerking activity of upper extremity associated with facial twitching requiring Ativan.  He was given tPA and admitted to ICU for neuro checks.  He was also intubated for airway protection.  EEG was ordered and Keppra was continued due to suspicion for seizure activity.  Urgent Cerebral angiogram showed no evidence of large vessel occlusion or flow-limiting stenosis.  He was later extubated with no further issues.  Stroke workup was continued.  Bubble study was negative.  Carotid Doppler showed no significant stenosis. MRI brain showed no acute intracranial changes.  Psychiatry was consulted due to episodic confusion and they recommended adding Remeron bedtime.  Aspirin statin and Keppra were continued and patient is being downgraded to hospital medicine service for continuity of care.    His mental status returned to baseline but patient has no recollection of the events leading to hospital admission.  When seen at bedside he was alert, oriented, answers most of questions appropriately.  Physical exam was unremarkable and he has good strength with intact speech and no facial deficits.  Son was at bedside.  Just cleared by SLP for p.o. intake.        Interval Hx:   Patient resting with eyes closed though awake.  No major complaints.  Sitter at bedside reported had good bowel movements overnight so he feels much better.     Discussed will hear something tomorrow from rehab   Case was discussed with patient's nurse on the floor    Objective/physical exam:  General: In no acute distress, afebrile, elderly male, pleasant  Chest:  Unlabored breathing, on room air  Heart:  Regular rate and rhythm   Abdomen: Soft, nontender, BS +  MSK: Warm, no lower extremity edema, no clubbing or cyanosis  Neurologic: Alert and oriented x4, answered all my questions appropriately, able to move all 4 extremities    VITAL SIGNS: 24 HRS MIN & MAX LAST   Temp  Min: 97.3 °F (36.3 °C)  Max: 99.2 °F (37.3 °C) 98.1 °F (36.7 °C)   BP  Min: 123/79  Max: 124/76 124/76   Pulse  Min: 73  Max: 89  84   Resp  Min: 16  Max: 17 17   SpO2  Min: 91 %  Max: 97 % 96 %     I reviewed the labs below:  Recent Labs   Lab 08/29/24 0419 08/30/24  0307 09/02/24  0404   WBC 7.82 5.92 8.33   RBC 4.06* 3.96* 4.12*   HGB 13.8* 13.4* 13.8*   HCT 40.0* 39.0* 41.9*   MCV 98.5* 98.5* 101.7*   MCH 34.0* 33.8* 33.5*   MCHC 34.5 34.4 32.9*   RDW 12.7 12.7 12.8    179 231   MPV 11.7* 11.6* 11.7*     Recent Labs   Lab 08/29/24 0419 08/30/24  0307 09/02/24  0404    145 146*   K 3.8 3.5 4.6   * 114* 108*   CO2 18* 22* 29   BUN 11.8 11.9 14.0   CREATININE 0.86 0.87 1.08   CALCIUM 9.3 9.4 9.8   MG 1.80 1.90 2.40   ALBUMIN 3.4 3.2* 3.4   ALKPHOS 42 39* 41   ALT 54 52 37   * 74* 26   BILITOT 0.8 0.8 0.8     Microbiology Results (last 7 days)       ** No results found for the last 168 hours. **           See below for Radiology    Intake and Output:  No intake or output data in the 24 hours ending 09/02/24 1501      Assessment/Plan:  Right-sided weakness S/P TNK 8/26 - resolved  ? Seizure activity- continue keppra   Macrocytic anemia, stable  Francois's esophagus  Hospital delirium, Depressive disorder   Hx of prostate cancer  Hypernatremia/Hyperchloremia       Admitted to ICU on 8/26, downgraded to hospital medicine team on 8/29 8/26- s/p TNK  8/28- MRI brain without  contrast- negative   Neurology on board, recommended Keppra lifelong, driving not recommended  Continue ASA 81 mg daily and atorvastatin 40 mg HS  Continue soft and bite sized diet with mildly thickened liquid  IV fluid discontinued, patient is consuming orally adequately  Psych also evaluated the patient, continue Remeron 15 mg PO HS for insomnia/depression.  Recommended delirium precautions.  Psych has signed off  Developed constipation--> moderate stool burden--> ordered Colace 200 mg x 1, patient requested Mag citrate, ordered--> 2 large movements overnight with resolution of constipation  Noted hypernatremia, hyperchloremia, encourage patient to have increase fluid intake, otherwise to start on IV fluids D5  PT OT on board, recommended rehab  Case management on board, patient and family requested [facility].  Request sent.  Expected transfer Tuesday  Morning bmp ordered    VTE prophylaxis:  Lovenox            Patient condition:  Stable    Anticipated discharge and Disposition:  [facility] rehab hopefully Tomorrow      All diagnosis and differential diagnosis have been reviewed; assessment and plan has been documented; I have personally reviewed the labs and test results that are presently available; I have reviewed the patients medication list; I have reviewed the consulting providers response and recommendations. I have reviewed or attempted to review medical records based upon their availability    All of the patient's questions have been  addressed and answered. Patient's is agreeable to the above stated plan. I will continue to monitor closely and make adjustments to medical management as needed.    Portions of this note dictated using EMR integrated voice recognition software, and may be subject to voice recognition errors not corrected at proofreading. Please contact writer for clarification if needed.   _____________________________________________________________________    Nutrition Status:  No RD  assessment  A minimum of two characteristics is recommended for diagnosis of either severe or non-severe malnutrition.     Current Med:   aspirin  81 mg Oral Daily    atorvastatin  40 mg Oral QHS    enoxparin  40 mg Subcutaneous Q24H (prophylaxis, 1700)    famotidine  20 mg Oral BID    levETIRAcetam  500 mg Oral BID    mirtazapine  15 mg Oral QHS      PRN meds:  Current Facility-Administered Medications:     acetaminophen, 650 mg, Oral, Q6H PRN    bisacodyL, 10 mg, Rectal, Daily PRN    dextrose 10%, 12.5 g, Intravenous, PRN    dextrose 10%, 25 g, Intravenous, PRN    glucagon (human recombinant), 1 mg, Intramuscular, PRN    hydrALAZINE, 10 mg, Intravenous, Q6H PRN    labetalol, 10 mg, Intravenous, Q6H PRN    meclizine, 12.5 mg, Oral, TID PRN    melatonin, 6 mg, Oral, Nightly PRN    ondansetron, 4 mg, Intravenous, Q8H PRN    senna, 8.6 mg, Oral, Daily PRN    sodium chloride 0.9%, 10 mL, Intravenous, PRN    Continuous infusion:       Radiology:   I have personally reviewed the images and agree with radiologist report  X-Ray Abdomen AP 1 View  Narrative: EXAMINATION:  XR ABDOMEN AP 1 VIEW    CLINICAL HISTORY:  abdominal pain;    COMPARISON:  None    FINDINGS:  Two frontal images of the abdomen.  There is a nonspecific, nonobstructive bowel gas pattern.  Residual contrast and moderate stool in the colon.  No large pneumoperitoneum.  Impression: No acute radiographic findings.    Electronically signed by: Mane Saeed  Date:    09/01/2024  Time:    14:36        Krishna Childs MD  Department of Hospital Medicine   [facility]   09/02/2024

## 2024-09-02 NOTE — NURSING
At beginning of shift during getting vitals, the pt and his personal sitter stated no more vital checks until 6 am tomorrow morning. Pt also refuses vital during the night

## 2024-09-02 NOTE — PT/OT/SLP PROGRESS
Pt and family refused stating pt did not get a good nights sleep last night and requesting to rest.

## 2024-09-03 ENCOUNTER — HOSPITAL ENCOUNTER (INPATIENT)
Facility: HOSPITAL | Age: 84
LOS: 3 days | Discharge: HOME | DRG: 948 | End: 2024-09-06
Attending: INTERNAL MEDICINE | Admitting: INTERNAL MEDICINE
Payer: MEDICARE

## 2024-09-03 VITALS
BODY MASS INDEX: 26.26 KG/M2 | TEMPERATURE: 98 F | OXYGEN SATURATION: 96 % | DIASTOLIC BLOOD PRESSURE: 73 MMHG | RESPIRATION RATE: 16 BRPM | HEIGHT: 67 IN | SYSTOLIC BLOOD PRESSURE: 113 MMHG | HEART RATE: 85 BPM | WEIGHT: 167.31 LBS

## 2024-09-03 DIAGNOSIS — R53.1 RIGHT SIDED WEAKNESS: ICD-10-CM

## 2024-09-03 DIAGNOSIS — R56.9 NEW ONSET SEIZURE: Primary | ICD-10-CM

## 2024-09-03 LAB
ANION GAP SERPL CALC-SCNC: 11 MEQ/L
BUN SERPL-MCNC: 16.7 MG/DL (ref 8.4–25.7)
CALCIUM SERPL-MCNC: 9.2 MG/DL (ref 8.8–10)
CHLORIDE SERPL-SCNC: 107 MMOL/L (ref 98–107)
CO2 SERPL-SCNC: 28 MMOL/L (ref 23–31)
CREAT SERPL-MCNC: 1.18 MG/DL (ref 0.73–1.18)
CREAT/UREA NIT SERPL: 14
GFR SERPLBLD CREATININE-BSD FMLA CKD-EPI: 60 ML/MIN/1.73/M2
GLUCOSE SERPL-MCNC: 105 MG/DL (ref 82–115)
POCT GLUCOSE: 102 MG/DL (ref 70–110)
POTASSIUM SERPL-SCNC: 4.4 MMOL/L (ref 3.5–5.1)
SODIUM SERPL-SCNC: 146 MMOL/L (ref 136–145)

## 2024-09-03 PROCEDURE — 97110 THERAPEUTIC EXERCISES: CPT | Mod: CQ

## 2024-09-03 PROCEDURE — 11800000 HC REHAB PRIVATE ROOM

## 2024-09-03 PROCEDURE — 94799 UNLISTED PULMONARY SVC/PX: CPT

## 2024-09-03 PROCEDURE — 97116 GAIT TRAINING THERAPY: CPT | Mod: CQ

## 2024-09-03 PROCEDURE — 99223 1ST HOSP IP/OBS HIGH 75: CPT | Mod: ,,, | Performed by: NURSE PRACTITIONER

## 2024-09-03 PROCEDURE — 25000003 PHARM REV CODE 250: Performed by: INTERNAL MEDICINE

## 2024-09-03 PROCEDURE — 99900031 HC PATIENT EDUCATION (STAT)

## 2024-09-03 PROCEDURE — 36415 COLL VENOUS BLD VENIPUNCTURE: CPT | Performed by: INTERNAL MEDICINE

## 2024-09-03 PROCEDURE — 25000003 PHARM REV CODE 250: Performed by: NURSE PRACTITIONER

## 2024-09-03 PROCEDURE — 63600175 PHARM REV CODE 636 W HCPCS: Performed by: NURSE PRACTITIONER

## 2024-09-03 PROCEDURE — 94761 N-INVAS EAR/PLS OXIMETRY MLT: CPT

## 2024-09-03 PROCEDURE — 80048 BASIC METABOLIC PNL TOTAL CA: CPT | Performed by: INTERNAL MEDICINE

## 2024-09-03 PROCEDURE — 99900035 HC TECH TIME PER 15 MIN (STAT)

## 2024-09-03 RX ORDER — ATORVASTATIN CALCIUM 40 MG/1
40 TABLET, FILM COATED ORAL NIGHTLY
Status: DISCONTINUED | OUTPATIENT
Start: 2024-09-03 | End: 2024-09-03 | Stop reason: SDUPTHER

## 2024-09-03 RX ORDER — ENOXAPARIN SODIUM 100 MG/ML
40 INJECTION SUBCUTANEOUS EVERY 24 HOURS
Status: DISCONTINUED | OUTPATIENT
Start: 2024-09-03 | End: 2024-09-06 | Stop reason: HOSPADM

## 2024-09-03 RX ORDER — ONDANSETRON HYDROCHLORIDE 2 MG/ML
4 INJECTION, SOLUTION INTRAVENOUS EVERY 8 HOURS PRN
Status: CANCELLED | OUTPATIENT
Start: 2024-09-03

## 2024-09-03 RX ORDER — SODIUM CHLORIDE 0.9 % (FLUSH) 0.9 %
10 SYRINGE (ML) INJECTION
Status: CANCELLED | OUTPATIENT
Start: 2024-09-03

## 2024-09-03 RX ORDER — DOCUSATE SODIUM 100 MG/1
100 CAPSULE, LIQUID FILLED ORAL 2 TIMES DAILY
Status: DISCONTINUED | OUTPATIENT
Start: 2024-09-03 | End: 2024-09-06 | Stop reason: HOSPADM

## 2024-09-03 RX ORDER — HYDRALAZINE HYDROCHLORIDE 20 MG/ML
10 INJECTION INTRAMUSCULAR; INTRAVENOUS EVERY 6 HOURS PRN
Status: DISCONTINUED | OUTPATIENT
Start: 2024-09-03 | End: 2024-09-03 | Stop reason: SDUPTHER

## 2024-09-03 RX ORDER — SODIUM CHLORIDE 0.9 % (FLUSH) 0.9 %
10 SYRINGE (ML) INJECTION
Status: DISCONTINUED | OUTPATIENT
Start: 2024-09-03 | End: 2024-09-06 | Stop reason: HOSPADM

## 2024-09-03 RX ORDER — BENZONATATE 100 MG/1
100 CAPSULE ORAL 3 TIMES DAILY PRN
Status: DISCONTINUED | OUTPATIENT
Start: 2024-09-03 | End: 2024-09-06 | Stop reason: HOSPADM

## 2024-09-03 RX ORDER — LABETALOL HCL 20 MG/4 ML
10 SYRINGE (ML) INTRAVENOUS EVERY 6 HOURS PRN
Status: DISCONTINUED | OUTPATIENT
Start: 2024-09-03 | End: 2024-09-03

## 2024-09-03 RX ORDER — MIRTAZAPINE 15 MG/1
15 TABLET, FILM COATED ORAL NIGHTLY
Status: DISCONTINUED | OUTPATIENT
Start: 2024-09-03 | End: 2024-09-03 | Stop reason: SDUPTHER

## 2024-09-03 RX ORDER — FAMOTIDINE 20 MG/1
20 TABLET, FILM COATED ORAL 2 TIMES DAILY
Status: DISCONTINUED | OUTPATIENT
Start: 2024-09-03 | End: 2024-09-06 | Stop reason: HOSPADM

## 2024-09-03 RX ORDER — METOPROLOL TARTRATE 1 MG/ML
10 INJECTION, SOLUTION INTRAVENOUS
Status: DISCONTINUED | OUTPATIENT
Start: 2024-09-03 | End: 2024-09-06 | Stop reason: HOSPADM

## 2024-09-03 RX ORDER — HYDRALAZINE HYDROCHLORIDE 20 MG/ML
10 INJECTION INTRAMUSCULAR; INTRAVENOUS EVERY 6 HOURS PRN
Status: CANCELLED | OUTPATIENT
Start: 2024-09-03

## 2024-09-03 RX ORDER — ENOXAPARIN SODIUM 100 MG/ML
40 INJECTION SUBCUTANEOUS EVERY 24 HOURS
Status: DISCONTINUED | OUTPATIENT
Start: 2024-09-03 | End: 2024-09-03

## 2024-09-03 RX ORDER — HYDROXYZINE PAMOATE 50 MG/1
50 CAPSULE ORAL NIGHTLY PRN
Status: DISCONTINUED | OUTPATIENT
Start: 2024-09-03 | End: 2024-09-05

## 2024-09-03 RX ORDER — GLUCAGON 1 MG
1 KIT INJECTION
Status: DISCONTINUED | OUTPATIENT
Start: 2024-09-03 | End: 2024-09-06 | Stop reason: HOSPADM

## 2024-09-03 RX ORDER — POLYETHYLENE GLYCOL 3350 17 G/17G
17 POWDER, FOR SOLUTION ORAL EVERY 12 HOURS PRN
Status: DISCONTINUED | OUTPATIENT
Start: 2024-09-03 | End: 2024-09-05

## 2024-09-03 RX ORDER — HYDROCODONE BITARTRATE AND ACETAMINOPHEN 5; 325 MG/1; MG/1
1 TABLET ORAL DAILY PRN
Status: DISCONTINUED | OUTPATIENT
Start: 2024-09-03 | End: 2024-09-06 | Stop reason: HOSPADM

## 2024-09-03 RX ORDER — ACETAMINOPHEN 325 MG/1
650 TABLET ORAL EVERY 6 HOURS PRN
Status: DISCONTINUED | OUTPATIENT
Start: 2024-09-03 | End: 2024-09-03 | Stop reason: SDUPTHER

## 2024-09-03 RX ORDER — LABETALOL HCL 20 MG/4 ML
10 SYRINGE (ML) INTRAVENOUS EVERY 4 HOURS PRN
Status: DISCONTINUED | OUTPATIENT
Start: 2024-09-03 | End: 2024-09-06 | Stop reason: HOSPADM

## 2024-09-03 RX ORDER — SENNOSIDES 8.6 MG/1
8.6 TABLET ORAL DAILY PRN
Status: CANCELLED | OUTPATIENT
Start: 2024-09-03

## 2024-09-03 RX ORDER — ONDANSETRON HYDROCHLORIDE 2 MG/ML
4 INJECTION, SOLUTION INTRAVENOUS EVERY 8 HOURS PRN
Status: DISCONTINUED | OUTPATIENT
Start: 2024-09-03 | End: 2024-09-03 | Stop reason: SDUPTHER

## 2024-09-03 RX ORDER — ONDANSETRON HYDROCHLORIDE 2 MG/ML
4 INJECTION, SOLUTION INTRAVENOUS EVERY 8 HOURS PRN
Status: DISCONTINUED | OUTPATIENT
Start: 2024-09-03 | End: 2024-09-06 | Stop reason: HOSPADM

## 2024-09-03 RX ORDER — LEVETIRACETAM 500 MG/1
500 TABLET ORAL 2 TIMES DAILY
Status: CANCELLED | OUTPATIENT
Start: 2024-09-03

## 2024-09-03 RX ORDER — TALC
6 POWDER (GRAM) TOPICAL NIGHTLY PRN
Status: DISCONTINUED | OUTPATIENT
Start: 2024-09-03 | End: 2024-09-05

## 2024-09-03 RX ORDER — ONDANSETRON 4 MG/1
8 TABLET, ORALLY DISINTEGRATING ORAL EVERY 8 HOURS PRN
Status: DISCONTINUED | OUTPATIENT
Start: 2024-09-03 | End: 2024-09-06 | Stop reason: HOSPADM

## 2024-09-03 RX ORDER — LEVETIRACETAM 500 MG/1
500 TABLET ORAL 2 TIMES DAILY
Status: DISCONTINUED | OUTPATIENT
Start: 2024-09-03 | End: 2024-09-03 | Stop reason: SDUPTHER

## 2024-09-03 RX ORDER — ATORVASTATIN CALCIUM 40 MG/1
40 TABLET, FILM COATED ORAL NIGHTLY
Status: CANCELLED | OUTPATIENT
Start: 2024-09-03

## 2024-09-03 RX ORDER — ASPIRIN 81 MG/1
81 TABLET ORAL DAILY
Status: DISCONTINUED | OUTPATIENT
Start: 2024-09-04 | End: 2024-09-03 | Stop reason: SDUPTHER

## 2024-09-03 RX ORDER — NITROGLYCERIN 0.4 MG/1
0.4 TABLET SUBLINGUAL EVERY 5 MIN PRN
Status: DISCONTINUED | OUTPATIENT
Start: 2024-09-03 | End: 2024-09-06 | Stop reason: HOSPADM

## 2024-09-03 RX ORDER — MIRTAZAPINE 15 MG/1
15 TABLET, FILM COATED ORAL NIGHTLY
Status: DISCONTINUED | OUTPATIENT
Start: 2024-09-03 | End: 2024-09-04

## 2024-09-03 RX ORDER — ENOXAPARIN SODIUM 100 MG/ML
40 INJECTION SUBCUTANEOUS EVERY 24 HOURS
Status: CANCELLED | OUTPATIENT
Start: 2024-09-03

## 2024-09-03 RX ORDER — ACETAMINOPHEN 325 MG/1
650 TABLET ORAL EVERY 6 HOURS PRN
Status: CANCELLED | OUTPATIENT
Start: 2024-09-03

## 2024-09-03 RX ORDER — MIRTAZAPINE 15 MG/1
15 TABLET, FILM COATED ORAL NIGHTLY
Status: CANCELLED | OUTPATIENT
Start: 2024-09-03

## 2024-09-03 RX ORDER — ASPIRIN 81 MG/1
81 TABLET ORAL DAILY
Status: CANCELLED | OUTPATIENT
Start: 2024-09-04

## 2024-09-03 RX ORDER — FAMOTIDINE 20 MG/1
20 TABLET, FILM COATED ORAL 2 TIMES DAILY
Status: CANCELLED | OUTPATIENT
Start: 2024-09-03

## 2024-09-03 RX ORDER — DOCUSATE SODIUM 100 MG/1
100 CAPSULE, LIQUID FILLED ORAL 2 TIMES DAILY
Status: DISCONTINUED | OUTPATIENT
Start: 2024-09-03 | End: 2024-09-03 | Stop reason: SDUPTHER

## 2024-09-03 RX ORDER — MECLIZINE HCL 12.5 MG 12.5 MG/1
12.5 TABLET ORAL 3 TIMES DAILY PRN
Status: DISCONTINUED | OUTPATIENT
Start: 2024-09-03 | End: 2024-09-06 | Stop reason: HOSPADM

## 2024-09-03 RX ORDER — ACETAMINOPHEN 325 MG/1
650 TABLET ORAL EVERY 6 HOURS PRN
Status: DISCONTINUED | OUTPATIENT
Start: 2024-09-03 | End: 2024-09-06 | Stop reason: HOSPADM

## 2024-09-03 RX ORDER — SENNOSIDES 8.6 MG/1
8.6 TABLET ORAL DAILY PRN
Status: DISCONTINUED | OUTPATIENT
Start: 2024-09-03 | End: 2024-09-06 | Stop reason: HOSPADM

## 2024-09-03 RX ORDER — TALC
6 POWDER (GRAM) TOPICAL NIGHTLY PRN
Status: CANCELLED | OUTPATIENT
Start: 2024-09-03

## 2024-09-03 RX ORDER — HYDRALAZINE HYDROCHLORIDE 20 MG/ML
10 INJECTION INTRAMUSCULAR; INTRAVENOUS EVERY 4 HOURS PRN
Status: DISCONTINUED | OUTPATIENT
Start: 2024-09-03 | End: 2024-09-06 | Stop reason: HOSPADM

## 2024-09-03 RX ORDER — LEVETIRACETAM 500 MG/1
500 TABLET ORAL 2 TIMES DAILY
Status: DISCONTINUED | OUTPATIENT
Start: 2024-09-03 | End: 2024-09-06 | Stop reason: HOSPADM

## 2024-09-03 RX ORDER — ATORVASTATIN CALCIUM 40 MG/1
40 TABLET, FILM COATED ORAL NIGHTLY
Status: DISCONTINUED | OUTPATIENT
Start: 2024-09-03 | End: 2024-09-06 | Stop reason: HOSPADM

## 2024-09-03 RX ORDER — GLUCAGON 1 MG
1 KIT INJECTION
Status: CANCELLED | OUTPATIENT
Start: 2024-09-03

## 2024-09-03 RX ORDER — LABETALOL HYDROCHLORIDE 5 MG/ML
10 INJECTION, SOLUTION INTRAVENOUS EVERY 6 HOURS PRN
Status: CANCELLED | OUTPATIENT
Start: 2024-09-03

## 2024-09-03 RX ORDER — ASPIRIN 81 MG/1
81 TABLET ORAL DAILY
Status: DISCONTINUED | OUTPATIENT
Start: 2024-09-03 | End: 2024-09-06 | Stop reason: HOSPADM

## 2024-09-03 RX ORDER — FAMOTIDINE 20 MG/1
20 TABLET, FILM COATED ORAL 2 TIMES DAILY
Status: DISCONTINUED | OUTPATIENT
Start: 2024-09-03 | End: 2024-09-03 | Stop reason: SDUPTHER

## 2024-09-03 RX ORDER — BISACODYL 10 MG/1
10 SUPPOSITORY RECTAL DAILY PRN
Status: DISCONTINUED | OUTPATIENT
Start: 2024-09-03 | End: 2024-09-06 | Stop reason: HOSPADM

## 2024-09-03 RX ORDER — ONDANSETRON 4 MG/1
4 TABLET, ORALLY DISINTEGRATING ORAL EVERY 6 HOURS PRN
Status: DISCONTINUED | OUTPATIENT
Start: 2024-09-03 | End: 2024-09-06 | Stop reason: HOSPADM

## 2024-09-03 RX ORDER — MECLIZINE HCL 12.5 MG 12.5 MG/1
12.5 TABLET ORAL 3 TIMES DAILY PRN
Status: CANCELLED | OUTPATIENT
Start: 2024-09-03

## 2024-09-03 RX ORDER — BISACODYL 10 MG/1
10 SUPPOSITORY RECTAL DAILY PRN
Status: CANCELLED | OUTPATIENT
Start: 2024-09-03

## 2024-09-03 RX ADMIN — LEVETIRACETAM 500 MG: 500 TABLET, FILM COATED ORAL at 08:09

## 2024-09-03 RX ADMIN — ENOXAPARIN SODIUM 40 MG: 40 INJECTION SUBCUTANEOUS at 05:09

## 2024-09-03 RX ADMIN — DOCUSATE SODIUM 100 MG: 100 CAPSULE, LIQUID FILLED ORAL at 08:09

## 2024-09-03 RX ADMIN — ASPIRIN 81 MG: 81 TABLET, COATED ORAL at 08:09

## 2024-09-03 RX ADMIN — ATORVASTATIN CALCIUM 40 MG: 40 TABLET, FILM COATED ORAL at 08:09

## 2024-09-03 RX ADMIN — FAMOTIDINE 20 MG: 20 TABLET, FILM COATED ORAL at 08:09

## 2024-09-03 RX ADMIN — ACETAMINOPHEN 650 MG: 325 TABLET ORAL at 08:09

## 2024-09-03 RX ADMIN — MIRTAZAPINE 15 MG: 15 TABLET, FILM COATED ORAL at 08:09

## 2024-09-03 NOTE — H&P
[facility] ([facility])  Rehab Admission H&P    Patient Name: Bi Whitley Jr.  MRN: [id number]  Age: 84 y.o. Sex: male  : 1940  Hospital Length of Stay: 0 days  Date of Service: 9/3/2024   Chief Complaint: Right-sided weakness s/p TNK on  s/p cerebral angiogram which did not demonstrate any LVO or flow-limiting stenosis on     Subjective:   History of Present Illness   84-year-old male presented to [facility] in [place] on  with complaints of right upper extremity weakness and slurred speech.  PMH significant for Francois esophagus and prostate cancer s/p radical prostatectomy.  Upon admit evaluation reported seizing with rhythmic jerking of right upper extremity and facial twitching.  Intubated for airway protection and initiated IV thrombolytic then transferred to [facility] for intervention.  On  tolerated cerebral angiogram which did not demonstrate any LVO or flow-limiting stenosis.  Tolerated extubation.  Psychiatry initiated Remeron 15 mg at bedtime / insomnia/major depressive disorder.  MRI brain without contrast negative.  Neurology initiated aspirin, statin, and Keppra. Tolerated transfer to [facility] inpatient rehab unit on 9/3 without incident.    Tolerated transfer.  Sitting in chair comfortably.  Reports good sleep and appetite.  Last BM .  Vital signs at goal with no recent recorded fevers.  Most recent labs and imaging reviewed and documented below.    Past Medical History: Right-sided weakness s/p TNK on  s/p cerebral angiogram which did not demonstrate any LVO or flow-limiting stenosis on , Francois esophagus and prostate cancer  Procedure history:  Appendectomy, tonsillectomy, radical prostatectomy  Family History: Mother:  at unknown age + heart disease, father:  at unknown age +heart disease  Social History:  Previously went to medical school.  Was in the [employer].  Is a retired orthopedic surgeon.    with 3 children.  Does not have medical alert.  (-) TOB.  Former smoker    (-) ETOH.   (-) Illicit drug use.   Prior level of function:  Independent in ADLs, drives, manages finances, manages medications, enjoys fishing, exercises.  Residence:  Lives with spouse in [place] in a two-story home however can stay on the 1st level.  Has a walk-in shower with grab bars, HHS, bench.  Has an elevated toilet no grab bars.  DME:  HHS and grab bars  Anticipated discharge destination:  Home with home health    Review of patient's allergies indicates:  No Known Allergies     Current Facility-Administered Medications:     acetaminophen tablet 650 mg, 650 mg, Oral, Q6H PRN, Krishna Childs MD, 650 mg at 09/03/24 2031    aspirin EC tablet 81 mg, 81 mg, Oral, Daily, Lori Linares FNP, 81 mg at 09/04/24 0740    atorvastatin tablet 40 mg, 40 mg, Oral, QHS, Lori Linares FNP, 40 mg at 09/03/24 2032    benzonatate capsule 100 mg, 100 mg, Oral, TID PRN, Lori Linares FNP    bisacodyL suppository 10 mg, 10 mg, Rectal, Daily PRN, Krishna Childs MD    dextrose 10% bolus 125 mL 125 mL, 12.5 g, Intravenous, PRN, Krishna Childs MD    dextrose 10% bolus 250 mL 250 mL, 25 g, Intravenous, PRN, Krishna Childs MD    docusate sodium capsule 100 mg, 100 mg, Oral, BID, Lori Linares FNP, 100 mg at 09/04/24 0740    enoxaparin injection 40 mg, 40 mg, Subcutaneous, Daily, Lori Linares FNP, 40 mg at 09/03/24 1700    famotidine tablet 20 mg, 20 mg, Oral, BID, Lori Linares FNP, 20 mg at 09/04/24 0740    glucagon (human recombinant) injection 1 mg, 1 mg, Intramuscular, PRN, Krishna Childs MD    hydrALAZINE injection 10 mg, 10 mg, Intravenous, Q4H PRN, Lori Linares FNP    HYDROcodone-acetaminophen 5-325 mg per tablet 1 tablet, 1 tablet, Oral, Daily PRN, Lori Linares, TYESHAP    hydrOXYzine pamoate capsule 50 mg, 50 mg, Oral, Nightly PRN, Lori Linares FNP    labetalol 20 mg/4 mL (5 mg/mL) IV syring, 10 mg, Intravenous,  "Q4H PRN, Lori Linares FNP    levETIRAcetam tablet 500 mg, 500 mg, Oral, BID, Lori Linares FNP, 500 mg at 09/04/24 0740    meclizine tablet 12.5 mg, 12.5 mg, Oral, TID PRN, Krishna Childs MD    melatonin tablet 6 mg, 6 mg, Oral, Nightly PRN, Krishna Childs MD    metoprolol injection 10 mg, 10 mg, Intravenous, Q2H PRN, Lori Linares FNP    mirtazapine tablet 15 mg, 15 mg, Oral, QHS, Lori Linares FNP, 15 mg at 09/03/24 2030    nitroGLYCERIN SL tablet 0.4 mg, 0.4 mg, Sublingual, Q5 Min PRN, Lori Linares FNP    ondansetron disintegrating tablet 4 mg, 4 mg, Oral, Q6H PRN, Lori Linares FNP    ondansetron disintegrating tablet 8 mg, 8 mg, Oral, Q8H PRN, Lori Linares FNP    ondansetron injection 4 mg, 4 mg, Intravenous, Q8H PRN, Lori Linares FNP    polyethylene glycol packet 17 g, 17 g, Oral, Q12H PRN, Lori Linares FNP    senna tablet 8.6 mg, 8.6 mg, Oral, Daily PRN, Krishna Childs MD    sodium chloride 0.9% flush 10 mL, 10 mL, Intravenous, PRN, Krishna Childs MD     Review of Systems   Complete 12-point review of symptoms negative except for what's mentioned in HPI     Objective:     /72   Pulse 69   Temp 97.4 °F (36.3 °C) (Oral)   Resp 18   Ht 5' 6" (1.676 m)   Wt 75.5 kg (166 lb 8 oz)   SpO2 96%   BMI 26.87 kg/m²     Physical Exam  Vitals reviewed.   Eyes:      Pupils: Pupils are equal, round, and reactive to light.   Cardiovascular:      Rate and Rhythm: Normal rate and regular rhythm.      Heart sounds: Normal heart sounds.   Pulmonary:      Effort: Pulmonary effort is normal.      Breath sounds: Normal breath sounds.   Abdominal:      General: Bowel sounds are normal.   Musculoskeletal:         General: Normal range of motion.   Skin:     General: Skin is warm.   Neurological:      General: No focal deficit present.      Mental Status: He is alert and oriented to person, place, and time.      Motor: Weakness present.   Psychiatric:         Mood and Affect: Mood " normal.     *MD performed and documented physical examination       Lines/Drains/Airways       Peripheral Intravenous Line  Duration                  Peripheral IV - Single Lumen 08/26/24 2215 22 G Anterior;Left Shoulder 7 days                    Labs  Recent Results (from the past 24 hour(s))   POCT glucose    Collection Time: 09/02/24  8:59 PM   Result Value Ref Range    POCT Glucose 101 70 - 110 mg/dL   Basic Metabolic Panel    Collection Time: 09/03/24  4:41 AM   Result Value Ref Range    Sodium 146 (H) 136 - 145 mmol/L    Potassium 4.4 3.5 - 5.1 mmol/L    Chloride 107 98 - 107 mmol/L    CO2 28 23 - 31 mmol/L    Glucose 105 82 - 115 mg/dL    Blood Urea Nitrogen 16.7 8.4 - 25.7 mg/dL    Creatinine 1.18 0.73 - 1.18 mg/dL    BUN/Creatinine Ratio 14     Calcium 9.2 8.8 - 10.0 mg/dL    Anion Gap 11.0 mEq/L    eGFR 60 mL/min/1.73/m2   POCT glucose    Collection Time: 09/03/24 11:52 AM   Result Value Ref Range    POCT Glucose 102 70 - 110 mg/dL     Radiology  MRI brain 08/28/2024: Impression: No acute intracranial findings.  Assessment/Plan:     84 y.o. WM admitted on (Not on file)    Right-sided weakness  - s/p TNK on 08/26  - s/p cerebral angiogram which did not demonstrate any LVO or flow-limiting stenosis on 08/26  - continue   Aspirin 81 mg daily   Atorvastatin 40 mg at night   - follow-up with neurology outpatient    Seizures   - no reported seizures  - seizure precautions in place  - continue     Keppra 500 mg b.i.d.  - follow-up with neurology outpatient    Anemia  - asymptomatic  - H/H stable   - no evidence of active bleeds  - will closely monitor and transfuse if needed     HLD  - FLP not at goal  - continue   Atorvastatin 40 mg at night     Barretts esophagus   - stable  - continue   Pepcid 20 mg b.i.d.     History of prostate cancer   - s/p radical prostatectomy  - no current urinary complaints    Oropharyngeal dysphagia   - Continue dysphagia diet with mildly thickened liquids   - Speech therapy  following    Constipation  - stable  - continue  Colace 100 mg b.i.d.     GERD  - Avoid spicy foods, and nothing to eat or drink within x2 hours of bedtime or laying flat (water is ok)   - Avoid NSAIDs (Advil, ibuprofen, naproxen...) and tillman-2 inhibitors (Mobic, Celebrex)    - continue   Pepcid 20 mg b.i.d.     Insomnia  - stable  - continue  Remeron 15 mg at night    Major depressive disorder  - mood stable  - continue  Remeron 15 mg at night    ADHD  - stable  - home medication: Vyvanse 20 mg daily    MEDICAL PLAN:  - Admit to [facility]  - Medical reconciliation completed and included in the electronic health record  - Draw admit labs including CBC, CMP, and Prealbumin  - Maintain weightbearing status as ordered  - Monitor postoperative surgical incision changing dressing daily  - Teach skin protection and wound prevention techniques  - Initiate fall precaution  - Initiate bowel training program  - Initiate bladder training as indicated  - Pain management  - IS q2 hours while awake    THERAPEUTIC PLAN:  - Physical therapy 60-90 minutes 5 to week to increase functional mobility, maintain weightbearing precautions, increase lower extremity restrengthening, increase overall activity tolerance, teach balance and safety measures as well as fall precautions, make equipment and orthotic recommendations, be involved in family training and discharge planning, monitor pain levels and vital signs during therapy adjusting treatment accordingly  - Occupational therapy 60-90 minutes 5 times a week to increase functional independence with activities of daily living, maintain weightbearing precautions, teach use of adaptive equipment, increase upper extremity restrengthening, increase overall activity tolerance, teach balance and safety measures as well as fall precautions, make equipment and orthotic recommendations, be involved in family training and discharge planning, monitor  pain levels and vital signs during therapy adjusting treatment accordingly  - Speech and language pathology will do an in-depth evaluation of patient's cognition, phonation, and swallowing. They will initiate therapy 30- 60 minutes 5 times a week as indicated  - Recreational therapy will incorporate all patient's functional abilities  into recreational activities that will require visual, spatial, and perceptual abilities; gross and fine motor coordination skills; the cognition to follow multistep commands; dynamic and static balance and reaching abilities. They will also incorporate animal assisted therapy into their weekly program  - Rehabilitation nursing will act as patient family physician liaison. They will integrate patient's functional abilities, after discussions with therapist, into everyday activities with the CNA's,  LPN's and RNs that will include but are not limited to dressing, bathing, feeding, grooming, toileting, transfers, hygiene etc. They will administer medications watching for wanted as well as unwanted effects. They will initiate DVT/VTE prophylaxis as ordered. Will monitor vital signs, lab values, and pain levels, making appropriate physician notification. They will monitor skin integrity including postop incision, making daily dressing changes. They will teach skin protection and wound prevention techniques. They will initiate bowel training They will initiate bladder training as indicated. They will initiate fall precautions  - Rehabilitation counseling/case management will act as patient and family liaison. They will set up family conferences, family training, aid in discharge planning, and aid in the procurement of assistive devices  - Patient will have 24 hour availability to physiatric care  - Patient will have nutritional services involved, monitoring oral input and visceral protein stores. They will make dietary and supplement recommendations and follow-up as necessary  - Patient  will have weekly interdisciplinary team conferences  - Patient will have initial family conference and follow up conferences as indicated  - Patient will have family training prior to discharge     Estimated length of stay - 14-17 days  Anticipated discharge destination - Home with   Medical status - stabilizing postoperatively; will need to monitor pain levels, postoperative skin integrity, watch for evidence of deep vein thrombosis, monitor postoperative H&H, monitor vital signs closely.  Medical prognosis - Good for post-op healing and functional improvement  Previous functional Status: Independent in ADLs, drives, manages finances, manages medications, enjoys fishing, exercises.  Present Functional Status:  Min to mod assist    VTE Prophylaxis: Lovenox 40 mg daily    POA: yes- Bi (son)  Living will:  Yes  Contacts: Daphne Whitley (spouse) [phone]     Bi Whitley (son) [phone]     Sania Whitley (daughter) [phone]    CODE STATUS: Full  Internal Medicine (attending): Bi Masterson MD  Physiatry (consulting):  Antonio Cee MD    OUTPATIENT PROVIDERS  PCP: Ba Page MD  Neurology: Moo Trejo MD    DISPOSITION: Condition stable. Tolerated transfer.  Recent labs and imaging reviewed.  Rehab admission orders initiated.  Med reconciliation completed.  Consult physiatry for rehab and pain management.  PT/OT/RT/ST to eval and treat.  Vital signs at goal.  Bowel management, sleep hygiene, and appetite at goal.    PHYSICIAN ATTESTATION: I have been involved in the patient's rehabilitation prescreening process and I have now completed the post admission physician evaluation. Patient is in need of, appropriate for, and should tolerate the above outlined inpatient rehabilitation plan. I believe this is necessary to reach maximal postoperative healing and maximal functional abilities. I do not believe this would be possible in a timely fashion in a less intensive setting      Lori Linares NP  conducted independent physical examination and assisted with medical documentation.    Total time spent on this encounter including chart review and direct MD + NP 1-on-1 patient interaction: 99 minutes   Over 50% of this time was spent in counseling and coordination of care

## 2024-09-03 NOTE — PLAN OF CARE
09/03/24 1304   Medicare Message   Important Message from Medicare regarding Discharge Appeal Rights Given to patient/caregiver;Explained to patient/caregiver     Pt voiced understanding of IMM

## 2024-09-03 NOTE — NURSING
Nurses Note -- 4 Eyes      9/3/2024   4:36 AM      Skin assessed during: Daily Assessment      [x] No Altered Skin Integrity Present    []Prevention Measures Documented      [] Yes- Altered Skin Integrity Present or Discovered   [] LDA Added if Not in Epic (Describe Wound)   [] New Altered Skin Integrity was Present on Admit and Documented in LDA   [] Wound Image Taken    Wound Care Consulted? No    Attending Nurse:  Reynold Bay LPN    Second RN/Staff Member:  Kimberly Wick RN

## 2024-09-03 NOTE — PLAN OF CARE
Salma with [facility] messaged she will check with Dr Cee but asking for OT. Cristopher with OT messaged to see pt .

## 2024-09-03 NOTE — NURSING
Nurses Note -- 4 Eyes      9/3/2024   10:50 AM      Skin assessed during: Daily Assessment      [x] No Altered Skin Integrity Present    []Prevention Measures Documented      [] Yes- Altered Skin Integrity Present or Discovered   [] LDA Added if Not in Epic (Describe Wound)   [] New Altered Skin Integrity was Present on Admit and Documented in LDA   [] Wound Image Taken    Wound Care Consulted? No    Attending Nurse:  Kimberly Quintana RN/Staff Member:  Reynold

## 2024-09-03 NOTE — PROGRESS NOTES
[facility] - Rehab Inpatient Services  Wound Care    Patient Name:  Bi Whitley Jr.   MRN:  [id number]  Date: 9/3/2024  Diagnosis: Right sided weakness    History:     Past Medical History:   Diagnosis Date    Francois esophagus     Prostate cancer        Social History     Socioeconomic History    Marital status:    Tobacco Use    Smoking status: Former     Social Determinants of Health     Financial Resource Strain: Low Risk  (8/27/2024)    Overall Financial Resource Strain (CARDIA)     Difficulty of Paying Living Expenses: Not hard at all   Food Insecurity: No Food Insecurity (8/27/2024)    Hunger Vital Sign     Worried About Running Out of Food in the Last Year: Never true     Ran Out of Food in the Last Year: Never true   Transportation Needs: No Transportation Needs (9/3/2024)    PRAPARE - Transportation     Lack of Transportation (Medical): No     Lack of Transportation (Non-Medical): No   Physical Activity: Sufficiently Active (8/27/2024)    Exercise Vital Sign     Days of Exercise per Week: 5 days     Minutes of Exercise per Session: 60 min   Stress: Patient Unable To Answer (8/27/2024)    [employer] - Occupational Stress Questionnaire     Feeling of Stress : Patient unable to answer   Housing Stability: Low Risk  (8/27/2024)    Housing Stability Vital Sign     Unable to Pay for Housing in the Last Year: No     Homeless in the Last Year: No       Precautions:     Allergies as of 09/03/2024    (No Known Allergies)       [facility] Assessment Details/Treatment      09/03/24 1455        Wound 08/26/24 1404 Incision Right Groin angiogram puncture   Date First Assessed/Time First Assessed: 08/26/24 1404   Primary Wound Type: Incision  Side: Right  Location: Groin  Incision Type: angiogram puncture  Additional Comments: MYNX   Dressing Appearance Open to air   Appearance Epithelialization  (healed)     Initial visit.   Moving well in bed - able to lift heels and  turn to side independently.  Left leg: reveals bruising that is fading.  Rt groin puncture is healed.  Denies rash or itching.  Will see prn new need.  09/03/2024

## 2024-09-03 NOTE — DISCHARGE SUMMARY
[facility]  Hospital Medicine Discharge Summary    Admit Date: 8/26/2024  Discharge Date and Time: 9/3/251568:23 AM  Admitting Physician:  Team  Discharging Physician: Krishna Childs MD.  Primary Care Physician: Kevan French MD  Consults: Neurology, Psychiatry, and neuro intervention    Discharge Diagnoses:  Right-sided weakness S/P TNK 8/26 - resolved  ? 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  Patient had no recollection of the events to hospital admission.  Patient was witnessed to have rhythmic jerking activity of upper extremity associated with facial twitching requiring Ativan.  He was given tPA and admitted to ICU for neuro checks.  He was also intubated for airway protection.  EEG was ordered and Keppra was continued due to suspicion for seizure activity.  Urgent Cerebral angiogram showed no evidence of large vessel occlusion or flow-limiting stenosis.  He was later extubated with no further issues.  Stroke workup was continued.  Bubble study was negative.  Carotid Doppler showed no significant stenosis. MRI brain showed no acute intracranial changes.  Psychiatry was consulted due to episodic confusion and they recommended adding Remeron bedtime.  Aspirin statin and Keppra were continued and patient is being downgraded to hospital medicine service for continuity of care.    His mental status returned to baseline but patient has no recollection of the events leading to  hospital admission.  When seen at bedside he was alert, oriented, answers most of questions appropriately.  Physical exam was unremarkable and he has good strength with intact speech and no facial deficits.  Son was at bedside.  Just cleared by SLP for p.o. intake.   Admitted to ICU on 8/26, downgraded to hospital medicine team on 8/29 8/26- s/p TNK  8/28- MRI brain without contrast- negative   Neurology on board, recommended Keppra lifelong, driving not recommended  Continue ASA 81 mg daily and atorvastatin 40 mg HS  Continue soft and bite sized diet with mildly thickened liquid  Psych also evaluated the patient, continue Remeron 15 mg PO HS for insomnia/depression.  Recommended delirium precautions.  Psych has signed off  Developed constipation--> moderate stool burden--> ordered Colace 200 mg x 1, patient requested Mag citrate, ordered--> 2 large movements overnight with resolution of constipation  Noted hypernatremia, hyperchloremia, encourage patient to have increase fluid intake,   PT OT on board, recommended rehab  Case management around 10:00 a.m. informed that patient has been accepted to  ortho rehab and requested discharge orders.  Patient is medically stable for discharge     Pt was seen and examined on the day of discharge  Vitals:  VITAL SIGNS: 24 HRS MIN & MAX LAST   Temp  Min: 97.6 °F (36.4 °C)  Max: 98.1 °F (36.7 °C) 98.1 °F (36.7 °C)   BP  Min: 102/66  Max: 124/76 113/73   Pulse  Min: 75  Max: 85  85   Resp  Min: 16  Max: 16 16   SpO2  Min: 95 %  Max: 96 % 96 %       Physical Exam:  General: In no acute distress, afebrile, elderly male, pleasant  Chest:  Unlabored breathing, on room air  Heart:  Regular rate and rhythm   Abdomen: Soft, nontender, BS +  MSK: Warm, no lower extremity edema, no clubbing or cyanosis  Neurologic: Alert and oriented x4, answered all my questions appropriately, able to move all 4 extremities    Recent Labs   Lab 08/29/24  0419 08/30/24  0307 09/02/24  0404   WBC 7.82  5.92 8.33   RBC 4.06* 3.96* 4.12*   HGB 13.8* 13.4* 13.8*   HCT 40.0* 39.0* 41.9*   MCV 98.5* 98.5* 101.7*   MCH 34.0* 33.8* 33.5*   MCHC 34.5 34.4 32.9*   RDW 12.7 12.7 12.8    179 231   MPV 11.7* 11.6* 11.7*       Recent Labs   Lab 08/29/24  0419 08/30/24  0307 09/02/24  0404 09/03/24  0441    145 146* 146*   K 3.8 3.5 4.6 4.4   * 114* 108* 107   CO2 18* 22* 29 28   BUN 11.8 11.9 14.0 16.7   CREATININE 0.86 0.87 1.08 1.18   CALCIUM 9.3 9.4 9.8 9.2   MG 1.80 1.90 2.40  --    ALBUMIN 3.4 3.2* 3.4  --    ALKPHOS 42 39* 41  --    ALT 54 52 37  --    * 74* 26  --    BILITOT 0.8 0.8 0.8  --         Microbiology Results (last 7 days)       ** No results found for the last 168 hours. **             X-Ray Abdomen AP 1 View  Narrative: EXAMINATION:  XR ABDOMEN AP 1 VIEW    CLINICAL HISTORY:  abdominal pain;    COMPARISON:  None    FINDINGS:  Two frontal images of the abdomen.  There is a nonspecific, nonobstructive bowel gas pattern.  Residual contrast and moderate stool in the colon.  No large pneumoperitoneum.  Impression: No acute radiographic findings.    Electronically signed by: Mane Saeed  Date:    09/01/2024  Time:    14:36         Medication List        START taking these medications      aspirin 81 MG EC tablet  Commonly known as: ECOTRIN  Take 1 tablet (81 mg total) by mouth once daily.     atorvastatin 40 MG tablet  Commonly known as: LIPITOR  Take 1 tablet (40 mg total) by mouth every evening.     levETIRAcetam 500 MG Tab  Commonly known as: KEPPRA  Take 1 tablet (500 mg total) by mouth 2 (two) times daily. for 7 days     mirtazapine 15 MG tablet  Commonly known as: REMERON  Take 1 tablet (15 mg total) by mouth every evening.            CONTINUE taking these medications      famotidine 20 MG tablet  Commonly known as: PEPCID     lisdexamfetamine 20 MG capsule  Commonly known as: VYVANSE            STOP taking these medications      esomeprazole 40 MG capsule  Commonly known as:  NEXIUM     omeprazole-sodium bicarbonate 40-1.1 mg-gram per capsule  Commonly known as: ZEGERID               Where to Get Your Medications        These medications were sent to [facility] - [place] - [address], [address]  [address], [address], [place] [address]      Phone: [phone]   aspirin 81 MG EC tablet  atorvastatin 40 MG tablet  levETIRAcetam 500 MG Tab  mirtazapine 15 MG tablet          Explained in detail to the patient about the discharge plan, medications, and follow-up visits. Pt understands and agrees with the treatment plan  Discharge Disposition:   [facility]  Discharged Condition: stable  Diet-   Dietary Orders (From admission, onward)       Start     Ordered    08/29/24 1204  Diet Soft & Bite Sized (IDDSI Level 6) No Straws; Mildly Thick Liquids (IDDSI Level 2)  Diet effective now        Question Answer Comment   Diet Modifier: No Straws    Consistency: Mildly Thick Liquids (IDDSI Level 2)        08/29/24 1203                   Medications Per DC med rec  Activities as tolerated   Follow-up Information       Moo Trejo MD. Schedule an appointment as soon as possible for a visit in 1 month(s).    Specialties: Neurology, Sleep Medicine  Contact information:  [address] [place] [address]  [address]  [place] [address] [phone]               Kevan French MD. Call.    Specialty: Family Medicine  Why: after rehab  Contact information:  [address]  [place] [address]  [phone]                           For further questions contact hospitalist office    Discharge time 34 minutes    For worsening symptoms, chest pain, shortness of breath, increased abdominal pain, high grade fever, stroke or stroke like symptoms, immediately go to the nearest Emergency Room or call 911 as soon as possible.    Portions of this note dictated using EMR integrated voice recognition software, and may be subject to voice recognition errors not corrected at  proofreading. Please contact writer for clarification if needed    Krishna Childs MD  Department of Hospital Medicine   [facility]   09/03/2024 10:23 AM

## 2024-09-03 NOTE — CONSULTS
Chief Complaint  new onset seizure; right sided weakness; dysphagia    Reason for Consultation  Physiatry    History of Present Illness  Admit MD: Bi Masterson MD  Consult Physiatry: Antonio Cee MD  HPI: 84 y.o. WM with a PMH of Francois's esophagus and prostate cancer status post radical prostatectomy presented  to [facility] on 08/26/24 with acute onset progressively worsening right-sided weakness and possible seizure activity associated with slurred speech, not associated with bladder or bowel incontinence.  Patient had no recollection of the events to hospital admission.  He was witnessed to have rhythmic jerking activity of upper extremity associated with facial twitching requiring Ativan.  He was given tPA on 8/26, and admitted to ICU for neuro checks.  He was also intubated for airway protection.  EEG was ordered and Keppra was continued due to suspicion for seizure activity.  Urgent Cerebral angiogram on 8/26 showed no evidence of large vessel occlusion or flow-limiting stenosis.  He was later extubated with no further issues.  Stroke workup was continued.  Bubble study was negative.  Carotid Doppler showed no significant stenosis. On 8/28, MRI brain showed no acute intracranial changes.  Psychiatry was consulted due to episodic confusion and they recommended adding Remeron at bedtime.  Aspirin, statin, and Keppra were continued and patient was downgraded to hospital medicine service for continuity of care.  His mental status returned to baseline, but patient has no recollection of the events leading to hospital admission.  On 8/27, PT/OT evals completed with deficits noted. On 8/28, speech swallow eval completed with recommendation for MBS due to signs and symptoms of aspiration. On 8/29, MBS showed moderate oropharyngeal dysphagia with SILENT aspiration of thin liquids so patient was placed on a soft & bite sized diet with mildly thick liquids with medications crushed in puree and  aspiration precautions in place. On 8/30, a cognitive eval was completed with functional speech/language and basic cognitive abilities with further assessment of functional cognitive abilities warranted upon rehab transfer due to high level of independent functioning prior. On 8/31, Dr. Trejo, neurology recommended lifelong anticonvulsant due to new onset seizure and no driving, and signed off of case. Patient continued to present with oropharyngeal dysphagia requiring diet modification to reduce risk of aspiration. On 9/2, labs showed low H&H of 13.8 & 41.9, elevated Na of 146, elevated CO2 of 108. Patient had a large BM. On 9/3, labs showed elevated Na of 146. Vital signs stable. Patient is AAOx4.  Participating with therapy. Functional status includes setup/supervision needed for eating and grooming, stand by assist for transfers with no AD, walked 100ft x2 with SBA with RW with CGA to SBA for standing balance, contact guard assist for toileting, supervision bed mobility, and patient was able to cross legs to perform lower body dressing to don/doff footwear. Patient was evaluated, accepted, and admitted to inpatient rehab to improve functional status. Transferred to [facility] on 9/3 without incident.      9/4: Seen in patient room, seated in recliner following ADL evaluation with OT. Reports sleep decreased and attributes to his frustration with what brought this all on. States that he saw a weight loss specialist and was prescribed Phentermine, which he now knows has potential side effects of seizures and stroke like symptoms. He is mad that the prescriber did not review things to watch for when starting this new medication. Reports appetite is less now than when he was taking the medication which was for about 2 weeks prior to hospitalization. Having a hard time drinking enough fluids due to the thickner. Will request for fresh fruit to be sent with meals. Relays that he was impacted and enemas and Mag Citrate  were unsuccessful. His bowels are now moving, but he feels as if he is dealing with some residual constipation. Denies pain. Therapy evaluating. VSSAF.           Review of Systems  Barriers to Discharge:  Social:  Medical school. Pt. Was in the [employer]. He is a retired orthopedic surgeon.  Has good family support, including wife.  Children: (3)    Medical:  Right-sided weakness s/p TNK on 08/26 s/p cerebral angiogram which did not demonstrate any LVO or flow-limiting stenosis on 08/26, moderate oropharyngeal dysphagia, Francois esophagus and prostate cancer     Functional:   Prior Level of Fx: Independent ADLs, drives, manages finances, manages medications, and enjoys fishing and exercising. He does not have a medic alert.   Residence: Pt. Lives with his spouse in [place] in a 2-story home however they stay on the first level. Has a walk-in shower with grab bars, HHS, and a bench.  He has an elevated toilet with grab bars.   DME: HHS and grab bars.   Psychiatric:  Denies mental health or substance abuse history.      Depression/Anxiety: frustration with situation     mirtazapine tablet 15 mg qHS. Increase 30mg  Pain: denies     acetaminophen tablet 650 mg q6h PRN mild pain  HYDROcodone-acetaminophen 5-325 mg 1 tablet qd PRN therapy  Bowels/Bladder: last BM 9/3     Appetite: fair     Sleep: decreased       mirtazapine tablet 15 mg qHS. Increase 30mg  hydrOXYzine pamoate capsule 50 mg qHS PRN Insomnia        Physical Exam  General: well-developed, well-nourished, in no acute distress  Eye: EOMI, clear conjunctiva, eyelids normal  HENT: normocephalic, oropharynx and nasal mucosal surfaces moist  Neck: full range of motion, supple  Respiratory: equal chest rise, no SOB, no audible wheeze  Cardiovascular: regular rate and rhythm, no edema  Gastrointestinal: soft, non-tender, non-distended   Musculoskeletal: right sided weakness-resolved   Integumentary: no rashes or skin lesions present  Neurologic: cranial  nerves intact, no signs of peripheral neurological deficit, motor/sensory function intact  *MD performed and documented physical examination         Labs:   Latest Reference Range & Units 09/04/24 05:16   WBC 4.50 - 11.50 x10(3)/mcL 4.18 (L)   RBC 4.70 - 6.10 x10(6)/mcL 3.95 (L)   Hemoglobin 14.0 - 18.0 g/dL 13.1 (L)   Hematocrit 42.0 - 52.0 % 40.3 (L)   MCV 80.0 - 94.0 fL 102.0 (H)   MCH 27.0 - 31.0 pg 33.2 (H)   MCHC 33.0 - 36.0 g/dL 32.5 (L)   RDW 11.5 - 17.0 % 12.6   Platelet Count 130 - 400 x10(3)/mcL 240   MPV 7.4 - 10.4 fL 11.7 (H)   Neut % % 51.1   LYMPH % % 25.6   Mono % % 13.4   Eos % % 7.2   Basophil % % 1.7   Immature Granulocytes % 1.0   Neut # 2.1 - 9.2 x10(3)/mcL 2.14   Lymph # 0.6 - 4.6 x10(3)/mcL 1.07   Mono # 0.1 - 1.3 x10(3)/mcL 0.56   Eos # 0 - 0.9 x10(3)/mcL 0.30   Baso # <=0.2 x10(3)/mcL 0.07   Immature Grans (Abs) 0 - 0.04 x10(3)/mcL 0.04   nRBC % 0.0   Sodium 136 - 145 mmol/L 146 (H)   Potassium 3.5 - 5.1 mmol/L 4.4   Chloride 98 - 107 mmol/L 107   CO2 23 - 31 mmol/L 30   Anion Gap mEq/L 9.0   BUN 8.4 - 25.7 mg/dL 14.9   Creatinine 0.73 - 1.18 mg/dL 1.01   BUN/CREAT RATIO  15   eGFR mL/min/1.73/m2 >60   Glucose 82 - 115 mg/dL 93   Calcium 8.8 - 10.0 mg/dL 9.4   ALP 40 - 150 unit/L 41   PROTEIN TOTAL 5.8 - 7.6 gm/dL 5.6 (L)   Albumin 3.4 - 4.8 g/dL 3.1 (L)   Albumin/Globulin Ratio 1.1 - 2.0 ratio 1.2   Prealbumin 16.0 - 42.0 mg/dL 16.7   BILIRUBIN TOTAL <=1.5 mg/dL 0.5   AST 5 - 34 unit/L 29   ALT 0 - 55 unit/L 39   Globulin, Total 2.4 - 3.5 gm/dL 2.5   (L): Data is abnormally low  (H): Data is abnormally high                Diagnostics:  XR CHEST 1 VIEW  Impression:  Faint increased markings in the left lung base most consistent with atelectasis.  Date:                                            08/26/2024  Time:                                           17:20       CT HEAD WITHOUT CONTRAST   Impression:  1. No acute intracranial abnormality.  2. Chronic microvascular ischemic changes.  Date:                                             08/27/2024  Time:                                           13:07      MRI BRAIN WITHOUT CONTRAST   FINDINGS:  Diffusion imaging is negative for acute infarct.  There is moderate patchy increased T2 and FLAIR signal in the cerebral white matter which is nonspecific but most commonly associated with chronic small vessel ischemic changes. There is moderate global atrophy.  Ventricles are not significantly enlarged  Signal voids are visible in the intracranial internal carotid arteries bilaterally and in the basilar artery implying gross patency.  Mild paranasal sinus inflammation  Impression:  No acute intracranial findings.  Date:                                            08/28/2024  Time:                                           11:05      CV Ultrasound Bilateral Doppler Carotid   The right internal carotid artery was patent with less than 50% stenosis.   The left internal carotid artery was patent with less than 50% stenosis.   Bilateral vertebral arteries were patent with antegrade flow.  Date of Study: 8/28/24       Transthoracic echo (TTE) complete     Left Ventricle: The left ventricle is normal in size. Normal wall thickness. There is normal systolic function with a visually estimated ejection fraction of 55 - 60%. Grade I diastolic dysfunction.    Right Ventricle: Systolic function is normal. TAPSE is 2.33 cm.    Left Atrium: Agitated saline study of the atrial septum is negative, suggesting absence of intracardiac shunt at the atrial level.    Aortic Valve: There is aortic valve sclerosis. There is trace aortic regurgitation.    Tricuspid Valve: The tricuspid valve is structurally normal. There is trace regurgitation.    Pulmonic Valve: The pulmonic valve is structurally normal. There is trace regurgitation.    Aorta: Aortic root is normal in size measuring 3.6 cm.    Pulmonary Artery: The estimated pulmonary artery systolic pressure is 22 mmHg.    IVC/SVC: Normal  venous pressure at 3 mmHg.    Pericardium: There is no pericardial effusion.  Date of Study: 8/28/24       EKG 12 LEAD  Test Reason : Z13.9,     Vent. Rate : 068 BPM     Atrial Rate : 068 BPM      P-R Int : 146 ms          QRS Dur : 084 ms       QT Int : 396 ms       P-R-T Axes : -04 071 128 degrees      QTc Int : 421 ms     Normal sinus rhythm   Nonspecific T wave abnormality   Abnormal ECG   When compared with ECG of 27-AUG-2024 14:29,   Vent. rate has decreased BY  43 BPM   Criteria for Septal infarct are no longer Present   ST no longer depressed in Inferior leads   ST less depressed in Lateral leads   T wave inversion no longer evident in Inferior leads   T wave inversion more evident in Lateral leads   Confirmed by Jose Alfredo Lea MD ([id number]) on 8/30/2024 8:35:16 AM         XR ABDOMEN AP 1 VIEW   Impression:  No acute radiographic findings.  Date:                                            09/01/2024  Time:                                           14:36        Assessment/Plan  Hospital   Right sided weakness s/p TNK   New onset seizure   Dysphagia   Delirium     Non-Hospital   Francois esophagus   Prostate cancer   H/O radical prostatectomy       Wounds: none noted  Precautions: aspiration, seizure  Bracing: TBD by therapy  Swallowing: Dysphagia soft & bite sized diet with mildly thick liquids with medications crushed in puree   Function: Tolerating therapy. Continue PT/OT  VTE Prophylaxis:   enoxaparin injection 40 mg SubQ q24hr  Code Status: FULL CODE   Discharge:  Lives with his spouse in [place] in a 2-story home however they stay on the first level. 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  Has good family support, including wife.  Children: (3). Date pending.     Dos 9/3/24  I have evaluated the patient on initial IRF admit. I have been involved in rehab prescreen. I have reviewed records.I have reviewed admit orders.  I find the patient appropriate for, in need of  and should tolerate an aggressive IRF program with good potential for functional improvement.  I am in agreement with initial Plan of Care  I have been involved in the creation of this initial PM&R consult with Vesta Gupta NP, conducted additional independent physical examination and assisted with medical documentation.

## 2024-09-03 NOTE — CONSULTS
[facility] - Rehab Inpatient Services  Inpatient Rehab Prescreen    PATIENT INFORMATION     Assessment date/time: 9/3/24 1114    Name: Bi Whitley Jr. Phone: [phone]   Address:   [address] [place], [place] [address]  [place] [address] SSN:    YOB: 1940 Age: 84 y.o. Gender: male   Race: White   Marital Status:    Advance Directives: Full code     COVERAGE INFORMATION     Patient Medicare #:  [id number]     Primary Insurance Type: MEDICARE/MEDICARE PART A & B / Part A effective 8/1/03, part B effective 1/1/04 Secondary Insurance Type: BLUE CROSS BLUE SHIELD/MEDICARE SUPPLEMENT BCBS OF [place]  /    Policy #:   Policy #:  [id number]    Insurance contact name/number:  Insurance contact name/number:    Authorization #:  Authorization #:    Verified Coverage: Insurance Carrier   Pending Coverage:    Prescription Coverage:  Insurance details/comments:      PHYSICIAN/REFERRAL INFORMATION     Primary Care Physician: Kevan French MD Attending Physician: Bi Masterson MD   Consulting physician/specialist:  Referring Physician:    Referring facility:  Referring contact name/phone:    Physician details/comments:      CONTACT INFORMATION   Extended Emergency Contact Information  Primary Emergency Contact: Daphne Whitley  Address: [address] [place] [address] [address]           [place] [address] [facility]  Home Phone: [phone]  Mobile Phone: [phone]  Relation: Spouse  Preferred language: English   needed? No  Secondary Emergency Contact: Bi Whitley  Mobile Phone: [phone]  Relation: Son  Preferred language: English    PRIOR LIVING SITUATION    Lives with wife in a 2 story home, but lives on 1st floor.      Bedroom location/setup: 1st floor    Bathroom location/setup: walk-in shower with hand-hled shower with railings and bench, and elevated commode with railing   Equipment at home: none    Prior device use:  none      PRIOR LEVEL OF FUNCTION     Did a helper need to  assist with the following activities prior to the current illness, exacerbation, or injury?   Self-care:  No, independent    Indoor mobility:  No, independent    Stairs: No, independent    Functional Cognition: No, independent    Comments: Patient was independent for mobility and all ADLs   Caregivers providing assistance: Bi Gutierrez- son with PO[employer]- [phone]; Sania- daughter- [phone]; Daphne- wife- [phone]   Pre-hospital home care service: none  Name of Agency:    Home/personal responsibilities: personal ADLs, drives, manages finances, manages medications, enjoys fishing and exercising   Pre-hospital vocational category: Retired for age   Pre-hospital vocational effort: Retired for age   Occupation/profession:  Return to work/school plan:    Educational history:    Hobbies/leisure activities:  Resources used prior to admission:    Available resources:  Resource information comments:      REHABILITATION DIAGNOSIS     History of present illness: 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  Patient had no recollection of the events to hospital admission.  Patient was witnessed to have rhythmic jerking activity of upper extremity associated with facial twitching requiring Ativan.  He was given tPA on 8/26, and admitted to ICU for neuro checks.  He was also intubated for airway protection.  EEG was ordered and Keppra was continued due to suspicion for seizure activity.  Urgent Cerebral angiogram on 8/26 showed no evidence of large vessel occlusion or flow-limiting stenosis.  He was later extubated with no further issues.  Stroke workup was continued.  Bubble study was negative.  Carotid Doppler showed no significant stenosis. On  8/28, MRI brain showed no acute intracranial changes.  Psychiatry was consulted due to episodic confusion and they recommended adding Remeron bedtime.  Aspirin, statin, and Keppra were continued and patient was downgraded to hospital medicine service for continuity of care.  His mental status returned to baseline, but patient has no recollection of the events leading to hospital admission.  On 8/27, PT/OT evals completed with deficits noted. On 8/28, speech swallow eval completed with recommendation for MBS due to signs and symptoms of aspiration. On 8/29, MBS showed moderate oropharyngeal dysphagia with SILENT aspiration of thin liquids so patient was placed on a soft & bite sized diet with mildly thick liquids with medications crushed in puree and aspiration precautions in place. On 8/30, a cognitive eval was completed with functional speech/language and basic cognitive abilities with further assessment of functional cognitive abilities warranted upon rehab transfer due to high level of independent functioning prior. On 8/31, Dr. Trejo, neurology recommended lifelong anticonvulsant due to new onset seizure and no driving, and signed off of case. Patient continued to present with oropharyngeal dysphagia requiring diet modification to reduce risk of aspiration. On 9/2, labs showed low H&H of 13.8 & 41.9, elevated Na of 146, elevated CO2 of 108. Patient had a large BM. On 9/3, labs showed elevated Na of 146. Vital signs stable. Prior to hospitalization, patient was living with wife in a 2 story home, but stayed on 1st floor. Had walk-in shower with hand-held shower head, railings, and bench; and an elevated commode with railing. Patient was completely independent for mobility and all Adls without use of equipment, driving, managing medications, managing finances, fishing, and exercising daily. Currently, patient is AAOx4; setup/supervision for eating and grooming, stand by assist for transfers with no AD, walked  100ft x2 with SBA with RW with CGA to SBA for standing balance, contact guard assist for toileting, supervision bed mobility, and patient was able to cross legs to perform lower body dressing to don/doff footwear. Pt. Requires acute inpatient rehab admission with 24-hour nursing and active physician oversight to monitor and manage acute medical comorbid conditions, labs, pain, and functional deficits.  Patient/family will also require teaching and integration of improving functional skills into daily living.  He will also require an individualized, interdisciplinary approach to his care, receiving PT, OT, ST services 3 hours per day, 5 days per week.    Required care cannot be provided at a lower level of care. Patient is anticipated to require approximately 5-7 days LOS with expected discharge home with spouse and son with HH   Impairment group (IGC):   Other Disabling Impairments 13 Etiologic diagnosis/description: new onset seizure   Date of onset:  8/26/24 Date of surgery: 8/26/24 cerebral angiogram   Allergies: Patient has no known allergies.   Comorbid condition: Anemia and Depression, hypernatremia, Francois's esophagus, hospital delirium   Medical/functional conditions requiring inpatient rehabilitation: This patient requires medical management/24-hour nursing of complex   comorbidities (new onset seizure, Anemia and Depression, hypernatremia, Francois's esophagus, hospital delirium ), labs, medications (see medications list), pain, sleep   hygiene, anticoagulation, nutrition, hydration, neurological,   pulmonary, cardiac status, and preventive healthcare.      This patient requires intense therapy and an integrated,   interdisciplinary approach to address safety, impaired mobility,   impaired ADL's (dressing, toileting, grooming, showering), dysphagia,   impaired cognition, judgment, and memory, communication,   pulmonary insufficiency, bowel/bladder problems,   preventive healthcare, medication management,  "integration of   improving functional skills into daily living, and home caregiver   support and training.   Risk for medical/clinical complications: This patient is at risk for the following complications: DVT/PE, pneumonia,   malnutrition, neurological decline, respiratory insufficiency,   worsening activity intolerance, complications from anticoagulation, skin breakdown, inadequate sleep, stroke, recurring seizure, and constipation.       SPECIAL REHABILITATION NEEDS     IV: 22 G left shoulder Preferred Language: english         Peripheral IV - Single Lumen 08/26/24 2215 22 G Anterior;Left Shoulder (Active)   Site Assessment Clean;Dry;Intact 09/03/24 0800   Extremity Assessment Distal to IV No abnormal discoloration 09/03/24 0800   Line Status Capped;Saline locked 09/03/24 0800   Dressing Status Clean;Dry;Intact 09/03/24 0800   Dressing Intervention Integrity maintained 09/03/24 0800          PRECAUTIONS     Aspiration precautions: dysphagia, Safety/fall precautions: High fall risk, and Seizure precautions: on Keppra     PAST MEDICAL, SOCIAL, FAMILY HISTORY     Pertinent past medical history:   Past Medical History:   Diagnosis Date    Francois esophagus     Prostate cancer       Has the patient had two or more falls in the past year or any fall with injury in the past year: No    Has the patient had major surgery during the 100 days prior to admission: Yes    Family Medical History:   Family History   Problem Relation Name Age of Onset    Heart disease Mother      Heart disease Father        Substance use history:   Social History     Substance and Sexual Activity   Alcohol Use Not on file     Social History     Tobacco Use   Smoking Status Former   Smokeless Tobacco Not on file     Social History     Substance and Sexual Activity   Drug Use Not on file      VITALS   BP:  113/73     Temp: 98.1 °F (36.7 °C)     HR: 85     Resp: 16     SpO2: 96 %     Height: 5' 6.93" (1.7 m)     Weight: 75.9 kg (167 lb 4.8 oz)   "   BMI: 26.3          MED/LABS/DIAGNOSITICS   No current facility-administered medications for this encounter.     Current Outpatient Medications   Medication Sig Dispense Refill    aspirin (ECOTRIN) 81 MG EC tablet Take 1 tablet (81 mg total) by mouth once daily. 30 tablet 11    atorvastatin (LIPITOR) 40 MG tablet Take 1 tablet (40 mg total) by mouth every evening. 90 tablet 3    levETIRAcetam (KEPPRA) 500 MG Tab Take 1 tablet (500 mg total) by mouth 2 (two) times daily. for 7 days 14 tablet 0    mirtazapine (REMERON) 15 MG tablet Take 1 tablet (15 mg total) by mouth every evening. 30 tablet 11     Facility-Administered Medications Ordered in Other Encounters   Medication Dose Route Frequency Provider Last Rate Last Admin    acetaminophen tablet 650 mg  650 mg Oral Q6H PRN Geno Glynn AGACNP-BC   650 mg at 09/02/24 2037    aspirin EC tablet 81 mg  81 mg Oral Daily Gonzalo Grewal MD   81 mg at 09/03/24 0844    atorvastatin tablet 40 mg  40 mg Oral QHS Kiana Gant, NP   40 mg at 09/02/24 2030    bisacodyL suppository 10 mg  10 mg Rectal Daily PRN Kiana Gant, NP   10 mg at 09/01/24 1303    dextrose 10% bolus 125 mL 125 mL  12.5 g Intravenous PRN Magdaleno Anthony DO   Stopped at 08/29/24 0644    dextrose 10% bolus 250 mL 250 mL  25 g Intravenous PRN Magdaleno Anthony DO        enoxaparin injection 40 mg  40 mg Subcutaneous Q24H (prophylaxis, 1700) Kade Saldivar MD   40 mg at 09/02/24 1731    famotidine tablet 20 mg  20 mg Oral BID Gonzalo Grewal MD   20 mg at 09/03/24 0844    glucagon (human recombinant) injection 1 mg  1 mg Intramuscular PRN Magdaleno Anthony DO        hydrALAZINE injection 10 mg  10 mg Intravenous Q6H PRN Sedrick Shah MD        labetaloL injection 10 mg  10 mg Intravenous Q6H PRN Sedrick Shah MD        levETIRAcetam tablet 500 mg  500 mg Oral BID Gonzalo Grewal MD   500 mg at 09/03/24 0844    meclizine tablet 12.5 mg  12.5 mg Oral TID PRN Gonzalo Grewal MD     "    melatonin tablet 6 mg  6 mg Oral Nightly PRN Geno Glynn AGACNP-BC   6 mg at 09/02/24 2029    mirtazapine tablet 15 mg  15 mg Oral QHS Thad Magallon NP   15 mg at 09/02/24 2029    ondansetron injection 4 mg  4 mg Intravenous Q8H PRN Kiana Gant NP        senna tablet 8.6 mg  8.6 mg Oral Daily PRN Krishna Childs MD   8.6 mg at 09/01/24 1315    sodium chloride 0.9% flush 10 mL  10 mL Intravenous PRN Andi Murphy MD          Pertinent lab results:   Lab Results   Component Value Date    WBC 8.33 09/02/2024    HGB 13.8 (L) 09/02/2024    HCT 41.9 (L) 09/02/2024     09/02/2024     (H) 09/03/2024    K 4.4 09/03/2024    BUN 16.7 09/03/2024    CO2 28 09/03/2024     09/03/2024      Pertinent diagnostic studies:    Additional labs and diagnostic studies required prior to admission:      QI: GG Care Tool     QI: GG Care Tool  Therapy Evaluation   Swallowing/  Nutritional Status   Diet/Feeding/  Swallowing Setup/ supervision    Eating       Oral Hygiene   Grooming Setup/ supervision    Toileting Hygiene       Shower/Bathe   Bathing    Upper Body Dressing   Dressing Upper    Lower Body Dressing   Dressing Lower Patient demo ability to cross legs to perform LB dsg and footwear don/doff    Putting On/Taking Off Footwear       Toilet Transfer   Toileting Upon entry, patient is exiting bathroom with PCA, after toileting, allwith CGA. PCA reports that patient "likes to walk fast".    Bladder Continence Status   Bladder     Bowel Continence Status   Bowel     Roll Left and Right   Bed Mobility Patient completed Scooting/Bridging with supervision   Sit to Lying       Lying to Sitting on Side of Bed       Sit to Stand       Chair/Bed-to- Chair   Transfers Sit to Stand:  stand by assistance with no AD     Car Transfer       Walk 10 Feet   Equipment      Walk 50 Feet with Two Turns   Balance CGA/SBA dynamic standing balance.    Walk 150 Feet   Endurance    Walk 10 Feet on Uneven Surfaces   Gait Pt " amb ~100' x 2 SBA w/RW. Forward trunk flexion and quick stepping eric noted    Picking up an Object       Wheel 50 Feet with Two Turns   Wheelchair    Wheel 150 Feet       1 Step (Curb)   Stairs    4 Steps       12 Steps       Expression of Ideas and Wants   Communication Independent   Understanding  Verbal and Non-Verbal Content       Cognitive Patterns   Cognition Independent      Safety Precautions       Lower Extremity      Strength RLE Strength: 5/5  LLE Strength: 5/5      ROM       Upper Extremity      Strength Right Upper Extremity:   WFL     Left Upper Extremity:  WFL      ROM Right Upper Extremity:   WFL     Left Upper Extremity:  WFL     Care Score Value Definitions  6: Independent. [place] provides no assistance with tasks. A device may or may not have been used.  5: Set-up or clean-up assistance. [place] sets up or cleans up, but does not assist with tasks. [place] may have assisted prior to or following the activity.  4: Supervision or touching assistance. [place] provides verbal cues or touching/steadying or contact guard assistance. Assistance may be provided throughout the activity or intermittently.  3: Partial/moderate assistance. [place] does less than half the effort. [place] lifts, holds, or supports trunk or limbs, but provides less than half the effort.  2: Substantial/maximal assistance. [place] does more than half the effort. [place] lifts or holds trunk or limbs, and provides more than half the effort.  1: Dependent. [place] does all of the effort, or the assistance of two or more helpers is required for the patient to complete the activity.  Care Score Activity Not Attempted Value Definitions  7: Patient refused.  9: Not applicable. Not attempted and the patient did not perform this activity prior to the current illness, exacerbation, or injury.  10: Not attempted due to environmental limitations (e.g., lack of equipment, weather constraints).  88: Not attempted due to medical condition or  safety concerns.    DISCHARGE GOALS/ANTICPATED INTERVENTIONS/SERVICES     Expected Level of Improvement for Safe Discharge: Patient/caregivers anticipate discharge home at or near baseline level of functioning and/or decreased burden of care.     Patient/Family/Caregiver Goals: Patient desires to increase current level of functioning to modified independence for mobility and all Adls so that he is able to discharge home safely with wife    Required Treatments/Services: Rehabilitation Nursing, Dietitian/nutrition, Social , and Case Management   Required Therapy Therapy Type Min/Day Days/Week Duration of Therapy   Physical Therapy 60 5 5-7 days   Occupational Therapy 60 5 5-7 days    Speech and Language Pathology 60 5 5-7 days    Prosthetic/Orthotics      Recreational Therapy      Anticipated Services upon Discharge:  home health with therapy vs outpatient therapy    Additional rehabilitation needs:  none    Expected Discharge Destination:  home with wife and son    Barriers to Discharge: None   Discharge Support: Patient has a caregiver available, Discharge plan has been verified with patient's caregiver, and Caregiver is in agreement with the discharge plan   Patient/Family/Caregiver Orientation: Patient/family/caregiver oriented and agreeable to inpatient rehabilitation plan   Estimated Length of Stay:7-10 days    Projected Admission Date: 9/3/24   Medical Prognosis: good   Physicians Review and Admission Determination: I have discussed patient with Nurse Liaison. I have reviewed the prescreen. Patient is in need of, appropriate for and should tolerate and benefit from an aggressive IRF stay.

## 2024-09-03 NOTE — ACP (ADVANCE CARE PLANNING)
Advance Directives:   Living Will: Yes        Oral Declaration: Yes  [place]POST: No    Do Not Resuscitate Status: No    Medical Power of : Yes        Oral Declaration: Yes  Agent's Name:  Bi Whitley (son) [phone]    Decision Making:  Patient answered questions  Goals of Care: The patient endorses that what is most important right now is to focus on extending life as long as possible, even it it means sacrificing quality    Accordingly, we have decided that the best plan to meet the patient's goals includes continuing with treatment    Witnesses present:  Patient and provider  ACP 2/2 new patient   ACP discussed voluntarily    Total time spent: 16 minutes

## 2024-09-03 NOTE — NURSING
Report called into Shazia MORALEZ RN at [facility].  She verbally stated understanding of report

## 2024-09-03 NOTE — PLAN OF CARE
Problem: Adult Inpatient Plan of Care  Goal: Optimal Comfort and Wellbeing  Outcome: Progressing     Problem: Adult Inpatient Plan of Care  Goal: Readiness for Transition of Care  Outcome: Progressing     Problem: Wound  Goal: Optimal Coping  Outcome: Progressing     Problem: Wound  Goal: Optimal Functional Ability  Outcome: Progressing     Problem: Wound  Goal: Optimal Pain Control and Function  Outcome: Progressing     Problem: Wound  Goal: Skin Health and Integrity  Outcome: Progressing

## 2024-09-03 NOTE — NURSING
Nurses Note -- 4 Eyes      9/3/2024   5:46 PM      Skin assessed during: Admit      [x] No Altered Skin Integrity Present    [x]Prevention Measures Documented      [] Yes- Altered Skin Integrity Present or Discovered   [] LDA Added if Not in Epic (Describe Wound)   [] New Altered Skin Integrity was Present on Admit and Documented in LDA   [] Wound Image Taken    Wound Care Consulted? No    Attending Nurse:  Shazia Quintana RN/Staff Member:  selene sepulveda

## 2024-09-03 NOTE — PLAN OF CARE
Problem: Adult Inpatient Plan of Care  Goal: Plan of Care Review  Outcome: Met  Goal: Patient-Specific Goal (Individualized)  Outcome: Met  Goal: Absence of Hospital-Acquired Illness or Injury  Outcome: Met  Goal: Optimal Comfort and Wellbeing  Outcome: Met  Goal: Readiness for Transition of Care  Outcome: Met     Problem: Wound  Goal: Optimal Coping  Outcome: Met  Goal: Optimal Functional Ability  Outcome: Met  Goal: Absence of Infection Signs and Symptoms  Outcome: Met  Goal: Improved Oral Intake  Outcome: Met  Goal: Optimal Pain Control and Function  Outcome: Met  Goal: Skin Health and Integrity  Outcome: Met  Goal: Optimal Wound Healing  Outcome: Met     Problem: Stroke, Ischemic (Includes Transient Ischemic Attack)  Goal: Optimal Coping  Outcome: Met  Goal: Effective Bowel Elimination  Outcome: Met  Goal: Optimal Cerebral Tissue Perfusion  Outcome: Met  Goal: Optimal Cognitive Function  Outcome: Met  Goal: Improved Communication Skills  Outcome: Met  Goal: Optimal Functional Ability  Outcome: Met  Goal: Optimal Nutrition Intake  Outcome: Met  Goal: Effective Oxygenation and Ventilation  Outcome: Met  Goal: Improved Sensorimotor Function  Outcome: Met  Goal: Safe and Effective Swallow  Outcome: Met  Goal: Effective Urinary Elimination  Outcome: Met     Problem: Fall Injury Risk  Goal: Absence of Fall and Fall-Related Injury  Outcome: Met     Problem: Restraint, Nonviolent  Goal: Absence of Harm or Injury  Outcome: Met     Problem: Skin Injury Risk Increased  Goal: Skin Health and Integrity  Outcome: Met

## 2024-09-03 NOTE — PLAN OF CARE
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 Presented with right sided weakness, slurred speech. New onset seizure.  Pt. Has a past medical history of Francoiss esophagus and prostate cancer s/p radical prostatectomy.  *See chart for full and complete medical history*          Hx mental health/substance abuse: Denies mental health or substance abuse history.        Insurance:  Medicare Part A&B/BCBS Medicare Supplement        Is there evidence of an acute change in mental status from the patients baseline?  yes, some delayed responses.       Education//Work Hx: Medical school. Pt. Was in the [employer]. Pt. Is a retired orthopedic surgeon.     Pt. Is  to Daphne Whitley ([phone]) Pt. Has good family support.       Children: (3) /Friends/Family: 1) Daphne Whitley, spouse ([phone]) 2) Bi Gutierrez, son ([phone]) 3) Sania Gutierrez, daughter ([phone])       Power of  (yes-Bi) Living Will (yes)        Prior Level of Fx: Independent ADLs, drives, manages finances, manages medications, and enjoys fishing and exercising. He does not have a medic alert.       Residence: Pt. Lives with his spouse in [place] in a 2-story home however they stay on the first level.     Pt. Has a walk-in shower with grab bars, HHS, and a bench.  He has an elevated toilet with grab bars.      DME: HHS and grab bars. Pt. States that he will not need any equipment.       HH/OP tx: Denies HH. Pt. Wants to review choices for HH.       Pharmacy: [facility] in [place] / (has prescription drug coverage)          MD(s): PCP:  Dr. Ba Page ([phone]); Neurologist: Dr. Moo Trejo ([phone]) Appointment 10/14/2024 2:15 PM

## 2024-09-03 NOTE — PT/OT/SLP PROGRESS
Physical Therapy Treatment    Patient Name:  Bi Whitley Jr.   MRN:  [id number]    Recommendations:     Discharge therapy intensity: High Intensity Therapy   Discharge Equipment Recommendations: none  Barriers to discharge: Ongoing medical needs    Assessment:     Bi Whitley Jr. is a 84 y.o. male admitted with a medical diagnosis of R weakness, seizure; s/p TNK; L MCA syndrome s/p cerebral angiogram.  He presents with the following impairments/functional limitations: weakness, gait instability, impaired endurance, impaired balance, impaired self care skills, impaired functional mobility, decreased coordination, decreased safety awareness, decreased lower extremity function.    Rehab Prognosis: Good; patient would benefit from acute skilled PT services to address these deficits and reach maximum level of function.    Recent Surgery: * No procedures listed * 8 Days Post-Op    Plan:     During this hospitalization, patient would benefit from acute PT services 6 x/week to address the identified rehab impairments via gait training, therapeutic activities, therapeutic exercises, neuromuscular re-education and progress toward the following goals:    Plan of Care Expires:  09/27/24    Subjective     Chief Complaint: Impaired balance and BLE weakness  Patient/Family Comments/goals: Get stronger  Pain/Comfort:         Objective:     Communicated with RN prior to session.  Patient found ambulatory in room/marsh with telemetry, chair check upon PT entry to room.     General Precautions: Standard, fall, aspiration  Orthopedic Precautions: N/A  Braces: N/A  Respiratory Status: Room air  Skin Integrity: Visible skin intact      Functional Mobility:  Transfers:     Sit to Stand:  stand by assistance with no AD  Gait: Pt amb ~100' x 2 SBA w/RW. Forward trunk flexion and quick stepping eric noted.  Balance: CGA/SBA dynamic standing balance.    Therapeutic Exercises:  STS x 20 from bschair  Standing @ window w/unilateral UE use;  Hip drop noted during RLE stance w/VC to correct.  Standing marches x 20  Standing calf raises x 20  Standing lunges w/3sec hold x 20  Standing hip abd x 20     Education:  Patient provided with verbal education education regarding PT role/goals/POC, fall prevention, safety awareness, and home exercise program.  Understanding was verbalized.     Patient left up in chair with all lines intact and call button in reach    GOALS:   Multidisciplinary Problems       Physical Therapy Goals          Problem: Physical Therapy    Goal Priority Disciplines Outcome Goal Variances Interventions   Physical Therapy Goal     PT, PT/OT Progressing     Description: Goals to be met by: 24     Patient will increase functional independence with mobility by performin. Supine to sit with Stand-by Assistance  2. Sit to supine with Stand-by Assistance  3. Sit to stand transfer with Stand-by Assistance  4. Gait  x 150 feet with Stand-by Assistance using Rolling Walker.                          Time Tracking:     PT Received On: 24  PT Start Time: 909     PT Stop Time: 932  PT Total Time (min): 23 min     Billable Minutes: Gait Training 15 and Therapeutic Exercise 8    Treatment Type: Treatment  PT/PTA: PTA     Number of PTA visits since last PT visit: 5     2024

## 2024-09-04 PROBLEM — R41.0 DELIRIUM: Status: ACTIVE | Noted: 2024-09-04

## 2024-09-04 PROBLEM — R13.10 DYSPHAGIA: Status: ACTIVE | Noted: 2024-09-04

## 2024-09-04 PROBLEM — R56.9 NEW ONSET SEIZURE: Status: ACTIVE | Noted: 2024-09-04

## 2024-09-04 PROBLEM — Z90.79 H/O RADICAL PROSTATECTOMY: Status: ACTIVE | Noted: 2024-09-04

## 2024-09-04 LAB
ALBUMIN SERPL-MCNC: 3.1 G/DL (ref 3.4–4.8)
ALBUMIN/GLOB SERPL: 1.2 RATIO (ref 1.1–2)
ALP SERPL-CCNC: 41 UNIT/L (ref 40–150)
ALT SERPL-CCNC: 39 UNIT/L (ref 0–55)
ANION GAP SERPL CALC-SCNC: 9 MEQ/L
AST SERPL-CCNC: 29 UNIT/L (ref 5–34)
BASOPHILS # BLD AUTO: 0.07 X10(3)/MCL
BASOPHILS NFR BLD AUTO: 1.7 %
BILIRUB SERPL-MCNC: 0.5 MG/DL
BUN SERPL-MCNC: 14.9 MG/DL (ref 8.4–25.7)
CALCIUM SERPL-MCNC: 9.4 MG/DL (ref 8.8–10)
CHLORIDE SERPL-SCNC: 107 MMOL/L (ref 98–107)
CO2 SERPL-SCNC: 30 MMOL/L (ref 23–31)
CREAT SERPL-MCNC: 1.01 MG/DL (ref 0.73–1.18)
CREAT/UREA NIT SERPL: 15
EOSINOPHIL # BLD AUTO: 0.3 X10(3)/MCL (ref 0–0.9)
EOSINOPHIL NFR BLD AUTO: 7.2 %
ERYTHROCYTE [DISTWIDTH] IN BLOOD BY AUTOMATED COUNT: 12.6 % (ref 11.5–17)
FERRITIN SERPL-MCNC: 223.31 NG/ML (ref 21.81–274.66)
GFR SERPLBLD CREATININE-BSD FMLA CKD-EPI: >60 ML/MIN/1.73/M2
GLOBULIN SER-MCNC: 2.5 GM/DL (ref 2.4–3.5)
GLUCOSE SERPL-MCNC: 93 MG/DL (ref 82–115)
HCT VFR BLD AUTO: 40.3 % (ref 42–52)
HGB BLD-MCNC: 13.1 G/DL (ref 14–18)
IMM GRANULOCYTES # BLD AUTO: 0.04 X10(3)/MCL (ref 0–0.04)
IMM GRANULOCYTES NFR BLD AUTO: 1 %
IRON SATN MFR SERPL: 45 % (ref 20–50)
IRON SERPL-MCNC: 79 UG/DL (ref 65–175)
LYMPHOCYTES # BLD AUTO: 1.07 X10(3)/MCL (ref 0.6–4.6)
LYMPHOCYTES NFR BLD AUTO: 25.6 %
MCH RBC QN AUTO: 33.2 PG (ref 27–31)
MCHC RBC AUTO-ENTMCNC: 32.5 G/DL (ref 33–36)
MCV RBC AUTO: 102 FL (ref 80–94)
MONOCYTES # BLD AUTO: 0.56 X10(3)/MCL (ref 0.1–1.3)
MONOCYTES NFR BLD AUTO: 13.4 %
NEUTROPHILS # BLD AUTO: 2.14 X10(3)/MCL (ref 2.1–9.2)
NEUTROPHILS NFR BLD AUTO: 51.1 %
NRBC BLD AUTO-RTO: 0 %
PLATELET # BLD AUTO: 240 X10(3)/MCL (ref 130–400)
PMV BLD AUTO: 11.7 FL (ref 7.4–10.4)
POTASSIUM SERPL-SCNC: 4.4 MMOL/L (ref 3.5–5.1)
PREALB SERPL-MCNC: 16.7 MG/DL (ref 16–42)
PROT SERPL-MCNC: 5.6 GM/DL (ref 5.8–7.6)
RBC # BLD AUTO: 3.95 X10(6)/MCL (ref 4.7–6.1)
SODIUM SERPL-SCNC: 146 MMOL/L (ref 136–145)
TIBC SERPL-MCNC: 176 UG/DL (ref 250–450)
TIBC SERPL-MCNC: 97 UG/DL (ref 69–240)
TRANSFERRIN SERPL-MCNC: 159 MG/DL
TSH SERPL-ACNC: 3.19 UIU/ML (ref 0.35–4.94)
WBC # BLD AUTO: 4.18 X10(3)/MCL (ref 4.5–11.5)

## 2024-09-04 PROCEDURE — 97110 THERAPEUTIC EXERCISES: CPT

## 2024-09-04 PROCEDURE — 83550 IRON BINDING TEST: CPT | Performed by: NURSE PRACTITIONER

## 2024-09-04 PROCEDURE — 92523 SPEECH SOUND LANG COMPREHEN: CPT

## 2024-09-04 PROCEDURE — 92610 EVALUATE SWALLOWING FUNCTION: CPT

## 2024-09-04 PROCEDURE — 97161 PT EVAL LOW COMPLEX 20 MIN: CPT

## 2024-09-04 PROCEDURE — 97530 THERAPEUTIC ACTIVITIES: CPT

## 2024-09-04 PROCEDURE — 36415 COLL VENOUS BLD VENIPUNCTURE: CPT | Performed by: NURSE PRACTITIONER

## 2024-09-04 PROCEDURE — 85025 COMPLETE CBC W/AUTO DIFF WBC: CPT | Performed by: NURSE PRACTITIONER

## 2024-09-04 PROCEDURE — 94761 N-INVAS EAR/PLS OXIMETRY MLT: CPT

## 2024-09-04 PROCEDURE — 99900031 HC PATIENT EDUCATION (STAT)

## 2024-09-04 PROCEDURE — 80053 COMPREHEN METABOLIC PANEL: CPT | Performed by: NURSE PRACTITIONER

## 2024-09-04 PROCEDURE — 82728 ASSAY OF FERRITIN: CPT | Performed by: NURSE PRACTITIONER

## 2024-09-04 PROCEDURE — 11800000 HC REHAB PRIVATE ROOM

## 2024-09-04 PROCEDURE — 84443 ASSAY THYROID STIM HORMONE: CPT | Performed by: NURSE PRACTITIONER

## 2024-09-04 PROCEDURE — 94799 UNLISTED PULMONARY SVC/PX: CPT

## 2024-09-04 PROCEDURE — 84134 ASSAY OF PREALBUMIN: CPT | Performed by: NURSE PRACTITIONER

## 2024-09-04 PROCEDURE — 63600175 PHARM REV CODE 636 W HCPCS: Performed by: NURSE PRACTITIONER

## 2024-09-04 PROCEDURE — 25000003 PHARM REV CODE 250: Performed by: NURSE PRACTITIONER

## 2024-09-04 PROCEDURE — 83540 ASSAY OF IRON: CPT | Performed by: NURSE PRACTITIONER

## 2024-09-04 PROCEDURE — 97165 OT EVAL LOW COMPLEX 30 MIN: CPT

## 2024-09-04 PROCEDURE — 97535 SELF CARE MNGMENT TRAINING: CPT

## 2024-09-04 RX ORDER — MIRTAZAPINE 15 MG/1
30 TABLET, FILM COATED ORAL NIGHTLY
Status: DISCONTINUED | OUTPATIENT
Start: 2024-09-04 | End: 2024-09-06 | Stop reason: HOSPADM

## 2024-09-04 RX ADMIN — ENOXAPARIN SODIUM 40 MG: 40 INJECTION SUBCUTANEOUS at 05:09

## 2024-09-04 RX ADMIN — MIRTAZAPINE 30 MG: 15 TABLET, FILM COATED ORAL at 08:09

## 2024-09-04 RX ADMIN — DOCUSATE SODIUM 100 MG: 100 CAPSULE, LIQUID FILLED ORAL at 07:09

## 2024-09-04 RX ADMIN — LEVETIRACETAM 500 MG: 500 TABLET, FILM COATED ORAL at 08:09

## 2024-09-04 RX ADMIN — LEVETIRACETAM 500 MG: 500 TABLET, FILM COATED ORAL at 07:09

## 2024-09-04 RX ADMIN — FAMOTIDINE 20 MG: 20 TABLET, FILM COATED ORAL at 08:09

## 2024-09-04 RX ADMIN — ATORVASTATIN CALCIUM 40 MG: 40 TABLET, FILM COATED ORAL at 08:09

## 2024-09-04 RX ADMIN — ASPIRIN 81 MG: 81 TABLET, COATED ORAL at 07:09

## 2024-09-04 RX ADMIN — DOCUSATE SODIUM 100 MG: 100 CAPSULE, LIQUID FILLED ORAL at 08:09

## 2024-09-04 RX ADMIN — FAMOTIDINE 20 MG: 20 TABLET, FILM COATED ORAL at 07:09

## 2024-09-04 NOTE — PT/OT/SLP EVAL
[facility] - Rehabilitation Unit  Speech Language Pathology Department  Cognitive-Communication Evaluation    Patient Name:  Bi STONER Gutierrez Foster MD   MRN:  [id number]    Recommendations     General recommendations:  Modified Barium Swallow Study and standardized cognitive testing  Communication strategies:  none    Discharge therapy intensity:  Low Intensity Therapy  Barriers to safe discharge: none    History     Past Medical History:   Diagnosis Date    Francois esophagus     Prostate cancer      Past Surgical History:   Procedure Laterality Date    APPENDECTOMY      RADICAL PROSTATECTOMY      TONSILLECTOMY         Previous level of Function  Education:doctorate (MD)  Occupation: retired  Lives: with spouse  Handed: Right  Glasses: yes  Hearing Aids: no    Imaging   Results for orders placed during the hospital encounter of 08/26/24    MRI Brain Without Contrast    Narrative  EXAMINATION:  MRI BRAIN WITHOUT CONTRAST    CLINICAL HISTORY:  Stroke, follow up;    TECHNIQUE:  Routine unenhanced brain MRI.    COMPARISON:  CT yesterday.    FINDINGS:  Diffusion imaging is negative for acute infarct.  There is moderate patchy increased T2 and FLAIR signal in the cerebral white matter which is nonspecific but most commonly associated with chronic small vessel ischemic changes. There is moderate global atrophy.  Ventricles are not significantly enlarged    Signal voids are visible in the intracranial internal carotid arteries bilaterally and in the basilar artery implying gross patency.  Mild paranasal sinus inflammation    Impression  No acute intracranial findings.      Electronically signed by: Mane Saeed  Date:    08/28/2024  Time:    11:05    Subjective     Patient awake, alert, and cooperative.  Patient goals: to go home   Spiritual/Cultural/Church Beliefs/Practices that affect care:  no    Pain/Comfort:  0/10    Respiratory Status:  room air    Objective     SPEECH  PRODUCTION  Phoneme Production: adequate  Voice Quality: adequate  Voice Production: adequate  Speech Rate: appropriate  Loudness: acceptable  Respiration: WFL for speech  Resonance: adequate  Prosody: adequate  Speech Intelligibility  Known Context: Greater that 90%  Unknown Context: Greater that 90%    AUDITORY COMPREHENSION  Following Directions:  1-Step: Within Functional Limits  2-Step: Within Functional Limits  Yes/No Questions:  Biographical: Within Functional Limits  Environmental: Within Functional Limits  Simple: Within Functional Limits  Complex: Within Functional Limits    VERBAL EXPRESSION  Automatic Speech:  Days of the week: Within Functional Limits  Months of the year: Within Functional Limits  Phrase Completion: Within Functional Limits  Confrontation Naming  Body Parts: Within Functional Limits  Objects: Within Functional Limits  Wh- Questions:  Object name: Within Functional Limits  Object function: Within Functional Limits    COGNITION  Orientation:  Person: inconsistent  Place: yes  Time: yes  Situation: yes   Attention:  Focused: Within Functional Limits  Sustained: Within Functional Limits  Memory:  Immediate: Within Functional Limits  Delayed: Impaired  Long Term: Within Functional Limits  Problem Solving  Functional simple: Within Functional Limits  Organization:  Convergent thinking: Within Functional Limits  Divergent thinking: Within Functional Limits    Assessment     Pt presents with functional speech and language skills. Due to previous high level of functioning, standardized cognitive linguistic testing is warranted. Goals and POC to be deferred until testing is complete.     Goals     Multidisciplinary Problems       SLP Goals          Problem: SLP    Goal Priority Disciplines Outcome   SLP Goal     SLP    Description: LTG: The pt will tolerate least restrictive diet with no overt s/sx of aspiration.    STGs:  The pt will complete tongue base/laryngeal elevation exercises with  minimal cues.                      Patient Education     Patient provided with verbal education regarding SLP POC.  Understanding was verbalized.    Plan     SLP Follow-Up:   Yes   Patient to be seen:   5x/week   Plan of Care expires:   9/11/24  Plan of Care reviewed with:   patient      Time Tracking     SLP Treatment Date:   09/04/24  Speech Start Time:  1500  Speech Stop Time:  1520     Speech Total Time (min):  20 min    Billable minutes:  Evaluation of Speech Sound Production with Comprehension and Expression, 20 minutes     09/04/2024

## 2024-09-04 NOTE — PLAN OF CARE
Admission PHQ-2 completed (score=2 minimal or no sxs).  PHQ-9 not warranted.  Discussed with pt how mood can affect his overall recovery.  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

## 2024-09-04 NOTE — PT/OT/SLP EVAL
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  He presents with the following impairments/functional limitations:   (no obvious impairments) Pt. C Hx of R rotator cuff Sx. Pt. C decreased AROM to R shoulder(flexion) . Internal/external rotation WFL.    General Precautions: Standard,       Orthopedic Precautions:  FWBAT    Braces:  N/A    Rehab Prognosis: Good; patient would benefit from acute skilled OT services to address these deficits and reach maximum level of function.      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     Vitals  Vitals at Rest  /82   HR 71   O2 Sat     Pain Pain Rating 1: 0/10         Respiratory Status: Room air    Patients cultural, spiritual, Mosque conflicts given the current situation:  N/A       Living Environment   Living Environment  People in Home: spouse  Shower Setup: Walk-in shower    Prior Level of Function  BADL: Independent    IADL: Independent    Equipment used at home:  .  DME owned (not currently used): shower chair and GB and HHS  .      Upon discharge, patient will have assistance from his wife.    Objective:     Patient found up in chair with chair check  upon OT entry to room.    Mobility   Patient completed:  Sit to Stand Transfer with independence with no assistive device  Stand to Sit Transfer with independence with no assistive " "device  Toilet Transfer Step Transfer technique with supervision with  rolling walker  Tub Transfer Stand Pivot technique with stand by assistance with grab bars    Functional Mobility   Room 403 <> OT GYM via RW c SBA    ADLs     Current Status   Eating 6   Oral Hygiene 6   Shower, Bathe Self 5   Upper Body Dressing 6   Lower Body Dressing 6   Toileting Hygiene 6   Toilet Transfer 4 SVN for safety    Putting On, Taking Off Footwear 6     Limiting Factors for ADLs:  dysphagia   IADLs: Pt. Performed dynamic standing Ax in kitchen c removal/replacement of dishes from lower/upper cabinets to countertop. Pt. C good balance and reach.     Exams     ROM:          -       WFL    Hand Dominance: Right    ROM Hand  Left Hand: WFL  Right Hand: WFL    Strength  Overall Strength:          -       WFL     Strength:   Left   Left  Strength Trial 1 Trial 2 Trail 3    Strength 50 54 50       Right   Right  Strength Trial 1 Trial 2 Trail 3    Strength 60 56 56       Pinch Strength:   WFL    Sensation  Left:          -       WNL  Right:          -       WNL    Coordination:      -       Intact    Tone  Left: WNL  Right: WNL    Visual/Perceptual  Intact and wears glasses    Cognition:   WFL    Balance    Sitting  Sitting Surface: TTB  Static: No UE Support, Normal  Dynamic: No UE extremity support, Good (+)    Standing  Static: No UE Support, Good (+)  Dynamic: One UE Extremity, Good (+)    Righting Reaction:   WNL    Posture/Deviations  Pt. C mild kyphosis; Pt. 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 Tolerated static standing c R UE AROM/FM x's 7 min c [employer] game. Pt. Performed 5 min B UE AROM on UBE at 1.8 norton while standing. Pt. C red [employer] performed B UE AROM through all joints/planes x's 15 reps each while sitting unsupported. Pt. C v/c's to maintain away from back rest to increase trunk strength.     LifeStyle Change and Education "     Patient left up in chair with call button in reach and chair alarm on.    Education provided: Roles and goals of OT, ADLs, transfer training, safety precautions, and home safety    Multidisciplinary Problems       Occupational Therapy Goals          Problem: Occupational Therapy    Goal Priority Disciplines Outcome Interventions   Occupational Therapy Goal     OT, PT/OT Progressing    Description: Pt. Will be Independent c all ADL and FM by D/C.  Pt. Will perform medicine management Ax c [place] by Re-Eval/D/C .  Pt. Will perform IADL c SVN-[place] by Re-Eval.   Pt. Will increase Ax esperanza. To 10 min dynamic/static standing c functional Ax by D/C.  Pt. Will increase core strength and home safety being mindful of seizure Px by D/C.                        Plan     During this hospitalization, patient to be seen  (5-7 x/week) to address the identified rehab impairments via self-care/home management, therapeutic activities, therapeutic exercises and progress toward the following goals:    Plan of Care Expires:  09/10/24    Time Tracking     OT Received On: 09/04/24  Time In 0730     Time Out 0900  Total Time 90 min  Therapy Time: OT Individual: 90  Missed Time:    Missed Time Reason:      Billable Minutes: Evaluation 30, Self Care/Home Management 30, Therapeutic Activity 10, and Therapeutic Exercise 20    09/04/2024

## 2024-09-04 NOTE — PROGRESS NOTES
[facility] ([facility])  Rehab Progress Note    Patient Name: Bi STONER Gutierrez Foster MD  MRN: [id number]  Age: 84 y.o. Sex: male  : 1940  Hospital Length of Stay: 1 days  Date of Service: 2024   Chief Complaint: Right-sided weakness s/p TNK on  s/p cerebral angiogram which did not demonstrate any LVO or flow-limiting stenosis on      Subjective:     Basic Information  Admit Information: 84-year-old male presented to [facility] in [place] on  with complaints of right upper extremity weakness and slurred speech.  PMH significant for Francois esophagus and prostate cancer s/p radical prostatectomy.  Upon admit evaluation reported seizing with rhythmic jerking of right upper extremity and facial twitching.  Intubated for airway protection and initiated IV thrombolytic then transferred to [facility] for intervention.  On  tolerated cerebral angiogram which did not demonstrate any LVO or flow-limiting stenosis.  Tolerated extubation.  Psychiatry initiated Remeron 15 mg at bedtime 2/2 insomnia/major depressive disorder.  MRI brain without contrast negative.  Neurology initiated aspirin, statin, and Keppra. Tolerated transfer to [facility] inpatient rehab unit on 9/3 without incident.   Today's Information: No acute events overnight.  Sitting in chair comfortably.  Reports okay sleep and good appetite.  Last BM 9/3.  Vital signs at goal with no recent recorded fevers.  Labs reviewed, leukopenia trended down slightly without associated fevers, H&H stable, sodium level 146 (stable), albumin 3.1, pre-albumin 16.7, otherwise unremarkable.  No imaging today.  Add TSH level to a.m. labs.    Review of patient's allergies indicates:  No Known Allergies     Current Facility-Administered Medications:     acetaminophen tablet 650 mg, 650 mg, Oral, Q6H PRN, Krishna Childs MD, 650 mg at 24    aspirin EC tablet 81 mg, 81 mg, Oral, Daily, Lori Linares FNP, 81 mg at 24  0740    atorvastatin tablet 40 mg, 40 mg, Oral, QHS, Lori Linares FNP, 40 mg at 09/03/24 2032    benzonatate capsule 100 mg, 100 mg, Oral, TID PRN, Lori Linares FNP    bisacodyL suppository 10 mg, 10 mg, Rectal, Daily PRNAmadeo Anjum, MD    dextrose 10% bolus 125 mL 125 mL, 12.5 g, Intravenous, PRNAmadeo Anjum, MD    dextrose 10% bolus 250 mL 250 mL, 25 g, Intravenous, PRN, Krishna Childs MD    docusate sodium capsule 100 mg, 100 mg, Oral, BID, Lori Linares FNP, 100 mg at 09/04/24 0740    enoxaparin injection 40 mg, 40 mg, Subcutaneous, Daily, Lori Linares FNP, 40 mg at 09/03/24 1700    famotidine tablet 20 mg, 20 mg, Oral, BID, Lori Linares FNP, 20 mg at 09/04/24 0740    glucagon (human recombinant) injection 1 mg, 1 mg, Intramuscular, PRN, Krishna hCilds MD    hydrALAZINE injection 10 mg, 10 mg, Intravenous, Q4H PRN, Lori Linares FNP    HYDROcodone-acetaminophen 5-325 mg per tablet 1 tablet, 1 tablet, Oral, Daily PRN, Lori Linares FNP    hydrOXYzine pamoate capsule 50 mg, 50 mg, Oral, Nightly PRN, Lori Linares FNP    labetalol 20 mg/4 mL (5 mg/mL) IV syring, 10 mg, Intravenous, Q4H PRN, Lori Linares FNP    levETIRAcetam tablet 500 mg, 500 mg, Oral, BID, Lori Linares FNP, 500 mg at 09/04/24 0740    meclizine tablet 12.5 mg, 12.5 mg, Oral, TID PRN, Krishna Childs MD    melatonin tablet 6 mg, 6 mg, Oral, Nightly PRN, Krishna Childs MD    metoprolol injection 10 mg, 10 mg, Intravenous, Q2H PRN, Lori Linares FNP    mirtazapine tablet 30 mg, 30 mg, Oral, QHS, Marlena Gupta FNP    nitroGLYCERIN SL tablet 0.4 mg, 0.4 mg, Sublingual, Q5 Min PRN, Lori Linares, ALANNA    ondansetron disintegrating tablet 4 mg, 4 mg, Oral, Q6H PRN, Lori Linares, ALANNA    ondansetron disintegrating tablet 8 mg, 8 mg, Oral, Q8H PRN, Lori Linares FNP    ondansetron injection 4 mg, 4 mg, Intravenous, Q8H PRN, Lori Linares, ALANNA    polyethylene glycol packet 17 g, 17  "g, Oral, Q12H PRN, Lori Linares, FNP    senna tablet 8.6 mg, 8.6 mg, Oral, Daily PRN, Krishna Childs MD    sodium chloride 0.9% flush 10 mL, 10 mL, Intravenous, PRN, Krishna Childs MD     Review of Systems   Complete 12-point review of symptoms negative except for what's mentioned in HPI     Objective:     /82   Pulse 71   Temp 97.4 °F (36.3 °C) (Oral)   Resp 18   Ht 5' 6" (1.676 m)   Wt 75.5 kg (166 lb 8 oz)   SpO2 96%   BMI 26.87 kg/m²      Physical Exam  Vitals reviewed.   Eyes:      Pupils: Pupils are equal, round, and reactive to light.   Cardiovascular:      Rate and Rhythm: Normal rate and regular rhythm.      Heart sounds: Normal heart sounds.   Pulmonary:      Effort: Pulmonary effort is normal.      Breath sounds: Normal breath sounds.   Abdominal:      General: Bowel sounds are normal.   Musculoskeletal:         General: Normal range of motion.   Skin:     General: Skin is warm.   Neurological:      General: No focal deficit present.      Mental Status: He is alert and oriented to person, place, and time.      Motor: Weakness present.   Psychiatric:         Mood and Affect: Mood normal.     *MD performed and documented physical examination       Lines/Drains/Airways       None                   Labs  Admission on 09/03/2024   Component Date Value Ref Range Status    Sodium 09/04/2024 146 (H)  136 - 145 mmol/L Final    Potassium 09/04/2024 4.4  3.5 - 5.1 mmol/L Final    Chloride 09/04/2024 107  98 - 107 mmol/L Final    CO2 09/04/2024 30  23 - 31 mmol/L Final    Glucose 09/04/2024 93  82 - 115 mg/dL Final    Blood Urea Nitrogen 09/04/2024 14.9  8.4 - 25.7 mg/dL Final    Creatinine 09/04/2024 1.01  0.73 - 1.18 mg/dL Final    Calcium 09/04/2024 9.4  8.8 - 10.0 mg/dL Final    Protein Total 09/04/2024 5.6 (L)  5.8 - 7.6 gm/dL Final    Albumin 09/04/2024 3.1 (L)  3.4 - 4.8 g/dL Final    Globulin 09/04/2024 2.5  2.4 - 3.5 gm/dL Final    Albumin/Globulin Ratio 09/04/2024 1.2  1.1 - 2.0 ratio Final    " Bilirubin Total 09/04/2024 0.5  <=1.5 mg/dL Final    ALP 09/04/2024 41  40 - 150 unit/L Final    ALT 09/04/2024 39  0 - 55 unit/L Final    AST 09/04/2024 29  5 - 34 unit/L Final    eGFR 09/04/2024 >60  mL/min/1.73/m2 Final    Anion Gap 09/04/2024 9.0  mEq/L Final    BUN/Creatinine Ratio 09/04/2024 15   Final    Prealbumin 09/04/2024 16.7  16.0 - 42.0 mg/dL Final    Ferritin Level 09/04/2024 223.31  21.81 - 274.66 ng/mL Final    Iron Binding Capacity Unsaturated 09/04/2024 97  69 - 240 ug/dL Final    Iron Level 09/04/2024 79  65 - 175 ug/dL Final    Transferrin 09/04/2024 159  mg/dL Final    Iron Binding Capacity Total 09/04/2024 176 (L)  250 - 450 ug/dL Final    Iron Saturation 09/04/2024 45  20 - 50 % Final    WBC 09/04/2024 4.18 (L)  4.50 - 11.50 x10(3)/mcL Final    RBC 09/04/2024 3.95 (L)  4.70 - 6.10 x10(6)/mcL Final    Hgb 09/04/2024 13.1 (L)  14.0 - 18.0 g/dL Final    Hct 09/04/2024 40.3 (L)  42.0 - 52.0 % Final    MCV 09/04/2024 102.0 (H)  80.0 - 94.0 fL Final    MCH 09/04/2024 33.2 (H)  27.0 - 31.0 pg Final    MCHC 09/04/2024 32.5 (L)  33.0 - 36.0 g/dL Final    RDW 09/04/2024 12.6  11.5 - 17.0 % Final    Platelet 09/04/2024 240  130 - 400 x10(3)/mcL Final    MPV 09/04/2024 11.7 (H)  7.4 - 10.4 fL Final    Neut % 09/04/2024 51.1  % Final    Lymph % 09/04/2024 25.6  % Final    Mono % 09/04/2024 13.4  % Final    Eos % 09/04/2024 7.2  % Final    Basophil % 09/04/2024 1.7  % Final    Lymph # 09/04/2024 1.07  0.6 - 4.6 x10(3)/mcL Final    Neut # 09/04/2024 2.14  2.1 - 9.2 x10(3)/mcL Final    Mono # 09/04/2024 0.56  0.1 - 1.3 x10(3)/mcL Final    Eos # 09/04/2024 0.30  0 - 0.9 x10(3)/mcL Final    Baso # 09/04/2024 0.07  <=0.2 x10(3)/mcL Final    IG# 09/04/2024 0.04  0 - 0.04 x10(3)/mcL Final    IG% 09/04/2024 1.0  % Final    NRBC% 09/04/2024 0.0  % Final     Radiology  MRI brain 08/28/2024: Impression: No acute intracranial findings.  Assessment/Plan:     84 y.o. WM admitted on 9/3/2024    Right-sided weakness  -  s/p TNK on 08/26  - s/p cerebral angiogram which did not demonstrate any LVO or flow-limiting stenosis on 08/26  - continue                Aspirin 81 mg daily   Atorvastatin 40 mg at night   - follow-up with neurology outpatient     Seizures   - no reported seizures  - seizure precautions in place  - continue                 Keppra 500 mg b.i.d.  - follow-up with neurology outpatient     Anemia  - asymptomatic  - H/H stable   - no evidence of active bleeds  - will closely monitor and transfuse if needed      HLD  - FLP not at goal  - continue                Atorvastatin 40 mg at night      Barretts esophagus   - stable  - continue                Pepcid 20 mg b.i.d.      History of prostate cancer   - s/p radical prostatectomy  - no current urinary complaints     Oropharyngeal dysphagia   - Continue dysphagia diet with mildly thickened liquids   - Speech therapy following     Constipation  - stable  - continue  Colace 100 mg b.i.d.      GERD  - Avoid spicy foods, and nothing to eat or drink within x2 hours of bedtime or laying flat (water is ok)   - Avoid NSAIDs (Advil, ibuprofen, naproxen...) and tillman-2 inhibitors (Mobic, Celebrex)    - continue                Pepcid 20 mg b.i.d.      Insomnia  - stable  - continue  Remeron 15 mg at night     Major depressive disorder  - mood stable  - continue  Remeron 15 mg at night     ADHD  - stable  - home medication: Vyvanse 20 mg daily     VTE Prophylaxis: Lovenox 40 mg daily     POA: yes- Bi (son)  Living will:  Yes  Contacts: Daphne Whitley (spouse) [phone]                     Bi Whitley (son) [phone]                     Sania Whitley (daughter) [phone]     CODE STATUS: Full  Internal Medicine (attending): Bi Masterson MD  Physiatry (consulting):  Antonio Cee MD     OUTPATIENT PROVIDERS  PCP: Ba Page MD  Neurology: Moo Trejo MD     DISPOSITION:  Vital signs at goal.  Labs reviewed.  Leukopenia trending down without associated fevers.  H&H  stable.  Hyponatremia stable overall.  Encourage oral fluid hydration.  Albumin 3.1, pre-albumin 16.7.  Reports previously on levothyroxine 50 mcg q.a.m. per PCP with no history of hypothyroidism.  Add TSH level to a.m. labs.  Bowel management, sleep hygiene, and appetite at goal.  Continues to progress well with therapies.  Monitor closely.  Notify of any acute changes.     Lori Linares NP conducted independent physical examination and assisted with medical documentation.     Total time spent on this encounter including chart review and direct MD + NP 1-on-1 patient interaction: 51 minutes   Over 50% of this time was spent in counseling and coordination of care

## 2024-09-04 NOTE — PT/OT/SLP EVAL
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  He presents with the following impairments/functional limitations:  weakness, impaired endurance, impaired functional mobility, gait instability, impaired balance, decreased lower extremity function, decreased safety awareness .    Rehab Diagnosis:     Recent Surgery:    General Precautions: Standard, fall, seizure     Orthopedic Precautions:N/A     Braces: N/A    Rehab Prognosis: Good; patient would benefit from acute skilled Recreational Therapy services to address these deficits and reach maximum level of function.      Impairments: Balance deficits, Endurance deficits, Mobility deficits, Safety awareness deficits, and Strength deficits  Rehab Potential: Good  Treatment Recommendations: Continue with Skill TR Service to facilitate functional independence and address impairments/limitations   Treatment Diagnosis: R sided weakness, sluured speech, new onset seizure, Fajardo's esophagus, prostrate ca, radical prostatectomy  Orientation: Oriented x4  Affect/Behavior: Appropriate and Cooperative  Safety/Judgement: intact   Basic Command Following: intact  Spiritual Cultural: no        History     Past Medical History:   Diagnosis Date    Fajardo esophagus     Prostate cancer        Past Surgical History:   Procedure Laterality Date    APPENDECTOMY      RADICAL PROSTATECTOMY      TONSILLECTOMY         Home Environment     Admit Date: 09/03/24  Living Situation  People in Home: spouse  Lives in: house  Patients Responsibilities: Caregiver to pet, Community mobility, , Financial management, Health and wellness, Leisure/play/hobbies  Number of Children: 3  Occupation:Semi Retired:  Orthopedic Surgeon    Instrumental Activities of Daily Living     Previous Hand Dominance:  "Right Current Hand Dominance: Right     Other iADL Information:        Cognitive Skills Building         Cognitive Observation Activity Assist Position Equipment Response            Comment:      Dynamic Activities      Activity Assist Position Equipment Response   Activity 1 Bocce ball independence and modified independence Standing Bocce balls good   Comment: Transfer from recliner was I.  Pt ambulated to and from treatment with no assistive device at setup/I level.  Sit to stand was setup/I as was dynamic standing balance/reaching. Balance was challenged with different stances at setup.   Some LOB when stepping with LLE to roll Bocce Balls. Ambulated 20 feet x1 to pick Bocce Balls from floor at setup.  Standing tolerance was 5 minutes.  UE coordination and problem solving skills were setup.  The purpose/goals of Recreation Therapy were explained.  At end of treatment pt asked what this Therapy was doing for him.  His loss of balance while stepping with his LLE was pointed out and how this could be a potential safety issue.  He was cooperative        Fine Motor Activities      Activity Assist Position Equipment Response           Comment:        Goals     Patient Goals  Patient Goal 1: "Return to my normal self."    Short Term Goals    Goal  Goal Status   Will increase activity tolerance to I Initiated   Will improve dynamic standing balance/reaching to setup Initiated                 Long Term Goals    Goal Goal Status   Will increase standing tolerance to 10 minutes Initiated   Will improve dynamic standing balance/reaching  to I Initiated                     Plan       Patient to be seen: Daily  Duration: 1 week  Treatments planned: Balance training, Energy conservation training, Safety education  Treatment plan/goals established with Patient/Caregiver: Yes     "

## 2024-09-04 NOTE — PLAN OF CARE
"Met with pt to discuss discharge planning in greater detail.    Pt indicated that he and his wife are moving to [place], [place] on 9/27/24 to be closer to a child.    Pt was a  for the [employer] athletic program for 40 years.      Pt's wife recently underwent a spinal surgery, so he has assumed the role as her caregiver, which he describes as "an anchor around (his) neck" but has been fulfilling his role after having been  for 61 years.      Until then, pt would like to be seen by Stephen Sinclair or Angel Luis Wright, PT(s) at [facility] in [place] ([phone]; fax [phone]).  Pt is adamantly opposed to having traditional HH services, as he has experience with their limited services after they cared for his wife.    Pt indicated that they have RW(s) and "around 20" canes at his home.  He does not think he will need any home DME ordered.      "

## 2024-09-04 NOTE — NURSING
Nurses Note -- 4 Eyes      9/3/2024   7:00 PM      Skin assessed during: Q Shift Change      [x] No Altered Skin Integrity Present    []Prevention Measures Documented      [] Yes- Altered Skin Integrity Present or Discovered   [] LDA Added if Not in Epic (Describe Wound)   [] New Altered Skin Integrity was Present on Admit and Documented in LDA   [] Wound Image Taken    Wound Care Consulted? No    Attending Nurse:  Ayse Quintana RN/Staff Member:  MANUELITO Mccray at shift change

## 2024-09-04 NOTE — PROGRESS NOTES
"   09/04/24 1130   Rec Therapy Time Calculation   Date of Treatment 09/04/24   Rec Start Time 1130   Rec Stop Time 1200   Rec Total Time (min) 30 min   Time   Treatment time 2 units   Charges   $Therapeutic Exercise 2 units   Precautions   General Precautions fall;seizure   Orthopedic Precautions  N/A   Braces N/A   Pain/Comfort   Pain Rating 1 no pain   Vital Signs   Pulse 69   /82   OTHER   Treatment Diagnosis R sided weakness, sluured speech, new onset seizure, Fajardo's esophagus, prostrate ca, radical prostatectomy   Rehab identified problem list/impairments weakness;impaired endurance;impaired functional mobility;gait instability;impaired balance;decreased lower extremity function;decreased safety awareness   Values/Beliefs/Spiritual Care   Spiritual, Cultural Beliefs, Mormonism Practices, Values that Affect Care no   Prior Living/ADLs   Admit Date 09/03/24   People in Home spouse   Living Arrangements house   Patient responsibilites: Caregiver to pet;Community mobility;;Financial management;Health and wellness;Leisure/play/hobbies   Number of Children 3   Occupation Semi Retired:  Orthopedic Surgeon   Previous Hand Domiance Right   Current Hand Dominance Right   Overall Level of Functioning   Activity Tolerance Independent   Dynamic Sitting Balance/Reaching Independent   Dynamic Standing Balance/Reaching Mod Indep   Right UE Coodination/Dexterity Mod Indep   Left UE Coordination/Dexterity Mod Indep   Problem Solving/Sequencing Skills Mod Indep   Memory Recall Mod Indep   R/L Neglect/Inattention Does not occur   Attention Span Mod Indep   Social Interaction Independent   Patient Goals   Patient Goal 1 "Return to my normal self."   Recreational Therapy Short Term Goals   Short Term Goal 1 Will increase activity tolerance to I   Short Term Goal 1 Progression Initiated   Short Term Goal 2 Will improve dynamic standing balance/reaching to setup   Short Term Goal 2 Progression Initiated   Recreational " Therapy Long Term Goals   Long Term Goal 1 Will increase standing tolerance to 10 minutes   Long Term Goal 1 Progression Initiated   Long Term Goal 2 Will improve dynamic standing balance/reaching  to I   Long Term Goal 2 Progression Initiated   Plan   Patient to be seen Daily   Planned Duration 1 week   Treatments Planned Balance training;Energy conservation training;Safety education   Treatment plan/goals estblished with Patient/Caregiver Yes

## 2024-09-04 NOTE — PT/OT/SLP EVAL
"[facility] - Rehabilitation Unit  Speech Language Pathology Department  Clinical Swallow Evaluation    Patient Name:  Bi Whitley Jr., MD   MRN:  [id number]    Recommendations     General recommendations:  Modified Barium Swallow Study, dysphagia therapy, and Speech/Language and Cognitive Evaluation  Solid texture recommendation:  Soft & Bite Sized Diet - IDDSI Level 6  Liquid consistency recommendation: Mildly thick liquids - IDDSI Level 2   Medications: crushed in puree  Swallow strategies/precautions: small bites/sips, slow rate, additional swallow per bite/sip, and alternate solids/liquids  General precautions: aspiration    History     Bi Whitley Jr., 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    Past Medical History:   Diagnosis Date    Francois esophagus     Prostate cancer      Past Surgical History:   Procedure Laterality Date    APPENDECTOMY      RADICAL PROSTATECTOMY      TONSILLECTOMY       Home diet texture/consistency: Regular and thin liquids  Current method of nutrition: PO diet (soft and bite sized, mildly thick liquids)    Patient complaint: "I want regular drinks"    Imaging   Results for orders placed during the hospital encounter of 08/26/24    X-Ray Chest 1 View    Narrative  EXAMINATION:  XR CHEST 1 VIEW    CLINICAL HISTORY:  rhonchi;    TECHNIQUE:  Single frontal view of the chest was performed.    COMPARISON:  None    FINDINGS:  No infiltrates are seen.  There faint increased markings in the left lung base most consistent with atelectasis.  Heart size is within normal limits.  There is vascular calcification noted.    Impression  Faint increased markings in the left lung base most consistent with atelectasis.      Electronically signed by: Clif Christensen MD  Date:    08/26/2024  Time:    17:20    No results found for this or any previous visit.    Results for orders placed during the hospital encounter of 08/26/24    MRI Brain Without " Contrast    Narrative  EXAMINATION:  MRI BRAIN WITHOUT CONTRAST    CLINICAL HISTORY:  Stroke, follow up;    TECHNIQUE:  Routine unenhanced brain MRI.    COMPARISON:  CT yesterday.    FINDINGS:  Diffusion imaging is negative for acute infarct.  There is moderate patchy increased T2 and FLAIR signal in the cerebral white matter which is nonspecific but most commonly associated with chronic small vessel ischemic changes. There is moderate global atrophy.  Ventricles are not significantly enlarged    Signal voids are visible in the intracranial internal carotid arteries bilaterally and in the basilar artery implying gross patency.  Mild paranasal sinus inflammation    Impression  No acute intracranial findings.      Electronically signed by: Mane Saeed  Date:    08/28/2024  Time:    11:05    Subjective     Patient awake, alert, and cooperative.    Patient goals: to have regular drinks   Spiritual/Cultural/Roman Catholic Beliefs/Practices that affect care:  no    Pain/Comfort:  0/10    Respiratory Status:  room air    Restraints/positioning devices: none    Objective     ORAL MUSCULATURE  Dentition: own teeth  Secretion Management: adequate  Mucosal Quality: good  Facial Movement: general weakness  Buccal Strength & Mobility: WFL  Mandibular Strength & Mobility: WFL  Oral Labial Strength & Mobility: WFL  Lingual Strength & Mobility: WFL  Vocal Quality: adequate    PO TRIALS  Consistency Fed By Oral Symptoms Pharyngeal Symptoms   Mildly thick liquid by cup Self None None   Puree Self None None   Chewable solid Self Lingual residue  Oral residue None     Assessment     Pt continues to present with oropharyngeal dysphagia requiring diet modifications to reduce the risk of aspiration. Skilled SLP intervention warranted at this time.     Goals     Multidisciplinary Problems       SLP Goals          Problem: SLP    Goal Priority Disciplines Outcome   SLP Goal     SLP    Description: LTG: The pt will tolerate least restrictive  diet with no overt s/sx of aspiration.    STGs:  The pt will complete tongue base/laryngeal elevation exercises with minimal cues.                      Education     Patient provided with verbal education regarding SLP POC.  Understanding was verbalized, however additional teaching warranted.    Plan     SLP Follow-Up:   Yes   Patient to be seen:   5x/week   Plan of Care expires:   9/11/24  Plan of Care reviewed with:   patient     Time Tracking     SLP Treatment Date:   09/04/24  Speech Start Time:  1330  Speech Stop Time:  1350     Speech Total Time (min):  20 min    Billable minutes:  Swallow and Oral Function Evaluation, 20 minutes     09/04/2024

## 2024-09-04 NOTE — PT/OT/SLP EVAL
Physical Therapy Rehab Evaluation    Patient Name:  Bi Whitley Jr., MD   MRN:  [id number]    Recommendations:     Discharge Recommendations:  Low Intensity Therapy   Discharge Equipment Recommendations: none   Barriers to discharge:  None from PT standpoint.     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  He presents with the following impairments/functional limitations:  weakness, impaired endurance, impaired functional mobility, gait instability, impaired balance, decreased lower extremity function, decreased safety awareness.    PT Student Note: Pt presents c some weakness in LE, slight gait abnormalities, such as occasional scissoring, and decreased gait speed, however pt is ready to be discharged from PT standpoint and can continue to increase LE function/strength/endurance at Outpatient PT/Home health PT.     Rehab Diagnosis: Ride sided weakness s/p TNK    General Precautions: Standard, fall     Orthopedic Precautions: N/A     Braces:      Rehab Prognosis: Good; patient would benefit from acute skilled PT services to address these deficits and reach maximum level of function.      History:     Past Medical History:   Diagnosis Date    Francois esophagus     Prostate cancer        Past Surgical History:   Procedure Laterality Date    APPENDECTOMY      RADICAL PROSTATECTOMY      TONSILLECTOMY         Subjective     Patient Goal: Pt wants to get back to fly fishing.    Patient Comments: Pt states that he wants to leave this weekend.    Patients cultural, spiritual, Roman Catholic conflicts given the current situation: no       Living Environment  People in Home: spouse  Living Arrangements: house  Home Accessibility: stairs within home    Prior Level of Function       Equipment used at home:  .      Upon discharge, patient will have assistance from Wife.    Objective:     Communicated with JUAN Mayen prior to session.  Patient found up in chair with  chair check  upon PT entry to room.    Vitals   Vitals at Rest  BP     HR     O2 Sat     Pain       Vitals With Activity  BP     HR     O2 Sat     Pain       Respiratory Status: Room air    Functional Mobility      GGs   Admit Current   Status Goal   Functional Area: Care Score: Care Score: Care Score:   Roll Left and Right 6  Asked pt about rolling L/R and he states he has no problem with it.  6 Independent   Sit to Lying 6  Asked pt and PT Tech Derrick and both said pt is independent. 6 Independent   Lying to Sitting on Side of Bed 6  Asked pt and PT Tech Derrick and both said pt is independent.   6 Independent   Sit to Stand 6  Independent.  6 Independent   Chair/Bed-to-Chair Transfer 6  Independent 6 Independent   Car Transfer 4  Supervision c VC's for safety. 4 Independent   Walk 10 Feet 6 6 Independent   Walk 50 Feet with Two Turns 6 6 Independent   Walk 150 Feet 6  Pt walked from room to 3rd floor, PT gym, then back to room. Pt ambulated ~500 ft c no AD independently. Pt had some scissoring and narrow SHANKAR c forward trunk lean.  6 Independent   Walk 10 Feet Uneven Surface 4  Pt completed 10ft ramp c no AD, slow pace and cautious, SBA.  4 Independent   1 Step (Curb) 6 6 Independent   4 Steps 6 6 Independent   12 Steps 6  Pt completed 12 stairs c bilateral rails, 1 step at a time independently.  6 Independent   Picking Up Object 6  Pt picked up a pen from the floorindependently. 6 Independent   Wheel 50 Feet with Two Turns 9 9 Not applicable   Wheel 150 Feet 9 9 Not applicable     Therapeutic Activities and Exercises:  Patient educated on role of acute care PT and PT POC  Patient educated about importance of OOB mobility and remaining up in chair most of the day.      Pt completed 6 trials of hurdles in // bars for SL balance and gait stability. Pt used the // bars to help keep his balance.    5 times Sit to Stand Test     Total Secs: 7.27    > 15 seconds predicts recurrent ryan   > or equal to 12 seconds identified  the need for further assess falls    Gait speed   Total Time 0.97 m/s  Norms:  > 1 m/s   >1.1 m/s predictive of completing yard work   >1.3 m/s Climb flight of stairs   < 1 m/s (increased fall risk)  Benefit from fall prevention   >0.67 m/s to complete self care   >0.89 m/s for household activities   < 0.60 m/s   Predicts future risk of falls and hospitalization   Tend to require assistance with ADL and IADL  >0.49 m/s to cross street   < 0.40 m/s  Longer length of stay in acute care   Likely to discharge to skilled nursing, inpatient rehab, or nursing home setting or with home health   Tinetti Balance Assessment Tool  Gait Score: 10/12  Balance score:13/16  Total Score=Balance + Gait Score: 23/28    Tinetti Tool Score   Risk of Falls   <=18    High   19-23   Moderate   >=24   Low     Timed Up & Go Test (TUG)  Assistive Device and/or Bracing Used: No Assistive Device  TUG Time: 12.15 seconds   Test time of > or equal to 12 sec. are associated with high fall risk.        Activity Tolerance: Good    Patient left up in chair with call button in reach.    Education Provided: roles and goals of PT/PTA, gait training, stair training, balance training, strengthening exercises, fall prevention, and oriented to student    Expected compliance: High compliance      Plan:     During this hospitalization, patient to be seen 5 x/week (5-7x/week) to address the identified rehab impairments via gait training, therapeutic exercises, neuromuscular re-education, therapeutic activities and progress toward the following goals:    GOALS:   Multidisciplinary Problems       Physical Therapy Goals          Problem: Physical Therapy    Goal Priority Disciplines Outcome Goal Variances Interventions   Physical Therapy Goal     PT, PT/OT      Description: Transfers:  Car transfer independently.     Mobility:  Ambulate 100 feet on uneven inside/outside independently.   Ascend/descend 12 stairs independently using one handrail.    Pt will lower  TUG score to an average of lower than 12 seconds.   Pt will increase Tinneti score to greater than 23.                         Plan of Care Expires:     PT Next Visit Date: 09/10/24  Plan of Care reviewed with: patient      Additional Infomation:     Pt can benefit from PT for his deficits, but pt is independent c most things and would benefit more from outpatient PT/Home health PT.    Time Tracking:     Therapy Time   PT Received On: 09/04/24  PT Start Time: 1000  PT Stop Time: 1130  PT Total Time (min): 90 min  PT Individual: 60  Missed Time: 30 Minutes  Time Missed due to: Patient unwilling to participate (Pt wanted to make calls.)    Billable Minutes: Evaluation 15, Therapeutic Activity 25, and Therapeutic Exercise 20    09/04/2024

## 2024-09-04 NOTE — PLAN OF CARE
Problem: Rehabilitation ([facility]) Plan of Care  Goal: Plan of Care Review  Outcome: Progressing  Goal: Patient-Specific Goal (Individualized)  Outcome: Progressing  Goal: Absence of New-Onset Illness or Injury  Outcome: Progressing  Goal: Optimal Comfort and Wellbeing  Outcome: Progressing  Goal: Home and Community Transition Plan Established  Outcome: Progressing     Problem: Stroke, Ischemic (Includes Transient Ischemic Attack)  Goal: Optimal Coping  Outcome: Progressing  Goal: Effective Bowel Elimination  Outcome: Progressing  Goal: Optimal Cerebral Tissue Perfusion  Outcome: Progressing  Goal: Optimal Cognitive Function  Outcome: Progressing  Goal: Improved Communication Skills  Outcome: Progressing  Goal: Optimal Functional Ability  Outcome: Progressing  Goal: Optimal Nutrition Intake  Outcome: Progressing  Goal: Effective Oxygenation and Ventilation  Outcome: Progressing  Goal: Improved Sensorimotor Function  Outcome: Progressing  Goal: Safe and Effective Swallow  Outcome: Progressing  Goal: Effective Urinary Elimination  Outcome: Progressing     Problem: Fall Injury Risk  Goal: Absence of Fall and Fall-Related Injury  Outcome: Progressing

## 2024-09-04 NOTE — NURSING
Nurses Note -- 4 Eyes      9/4/2024   10:21 AM      Skin assessed during: Q Shift Change      [x] No Altered Skin Integrity Present    []Prevention Measures Documented      [] Yes- Altered Skin Integrity Present or Discovered   [] LDA Added if Not in Epic (Describe Wound)   [] New Altered Skin Integrity was Present on Admit and Documented in LDA   [] Wound Image Taken    Wound Care Consulted? No    Attending Nurse:  DARYA Osborne RN/Staff Member:  MANUELITO Reilly

## 2024-09-04 NOTE — PLAN OF CARE
Problem: Occupational Therapy  Goal: Occupational Therapy Goal  Description: Pt. Will be Independent c all ADL and FM by D/C.  Pt. Will perform medicine management Ax c [place] by Re-Eval/D/C .  Pt. Will perform IADL c SVN-[place] by Re-Eval.   Pt. Will increase Ax esperanza. To 10 min dynamic/static standing c functional Ax by D/C.  Pt. Will increase core strength and home safety being mindful of seizure Px by D/C.   Outcome: Progressing

## 2024-09-05 PROCEDURE — 25000003 PHARM REV CODE 250: Performed by: NURSE PRACTITIONER

## 2024-09-05 PROCEDURE — 94761 N-INVAS EAR/PLS OXIMETRY MLT: CPT

## 2024-09-05 PROCEDURE — 99233 SBSQ HOSP IP/OBS HIGH 50: CPT | Mod: ,,, | Performed by: NURSE PRACTITIONER

## 2024-09-05 PROCEDURE — 97112 NEUROMUSCULAR REEDUCATION: CPT

## 2024-09-05 PROCEDURE — 63600175 PHARM REV CODE 636 W HCPCS: Performed by: NURSE PRACTITIONER

## 2024-09-05 PROCEDURE — 97110 THERAPEUTIC EXERCISES: CPT

## 2024-09-05 PROCEDURE — BD15YZZ FLUOROSCOPY OF UPPER GI USING OTHER CONTRAST: ICD-10-PCS | Performed by: INTERNAL MEDICINE

## 2024-09-05 PROCEDURE — 92526 ORAL FUNCTION THERAPY: CPT

## 2024-09-05 PROCEDURE — 11800000 HC REHAB PRIVATE ROOM

## 2024-09-05 PROCEDURE — 92611 MOTION FLUOROSCOPY/SWALLOW: CPT

## 2024-09-05 PROCEDURE — 25000003 PHARM REV CODE 250: Performed by: INTERNAL MEDICINE

## 2024-09-05 PROCEDURE — 94799 UNLISTED PULMONARY SVC/PX: CPT

## 2024-09-05 PROCEDURE — 99900031 HC PATIENT EDUCATION (STAT)

## 2024-09-05 PROCEDURE — 97535 SELF CARE MNGMENT TRAINING: CPT

## 2024-09-05 PROCEDURE — 97530 THERAPEUTIC ACTIVITIES: CPT

## 2024-09-05 RX ORDER — BISACODYL 5 MG
5 TABLET, DELAYED RELEASE (ENTERIC COATED) ORAL DAILY PRN
Status: DISCONTINUED | OUTPATIENT
Start: 2024-09-05 | End: 2024-09-06 | Stop reason: HOSPADM

## 2024-09-05 RX ORDER — BISACODYL 10 MG/1
10 SUPPOSITORY RECTAL ONCE
Status: DISCONTINUED | OUTPATIENT
Start: 2024-09-05 | End: 2024-09-06 | Stop reason: HOSPADM

## 2024-09-05 RX ORDER — HYDROXYZINE PAMOATE 50 MG/1
50 CAPSULE ORAL NIGHTLY
Status: DISCONTINUED | OUTPATIENT
Start: 2024-09-05 | End: 2024-09-06 | Stop reason: HOSPADM

## 2024-09-05 RX ORDER — CHOLECALCIFEROL (VITAMIN D3) 25 MCG
1000 TABLET ORAL DAILY
Status: DISCONTINUED | OUTPATIENT
Start: 2024-09-05 | End: 2024-09-06 | Stop reason: HOSPADM

## 2024-09-05 RX ADMIN — BISACODYL 5 MG: 5 TABLET, COATED ORAL at 01:09

## 2024-09-05 RX ADMIN — ATORVASTATIN CALCIUM 40 MG: 40 TABLET, FILM COATED ORAL at 08:09

## 2024-09-05 RX ADMIN — ASPIRIN 81 MG: 81 TABLET, COATED ORAL at 08:09

## 2024-09-05 RX ADMIN — DOCUSATE SODIUM 100 MG: 100 CAPSULE, LIQUID FILLED ORAL at 08:09

## 2024-09-05 RX ADMIN — HYDROXYZINE PAMOATE 50 MG: 50 CAPSULE ORAL at 08:09

## 2024-09-05 RX ADMIN — FAMOTIDINE 20 MG: 20 TABLET, FILM COATED ORAL at 08:09

## 2024-09-05 RX ADMIN — MIRTAZAPINE 30 MG: 15 TABLET, FILM COATED ORAL at 08:09

## 2024-09-05 RX ADMIN — LEVETIRACETAM 500 MG: 500 TABLET, FILM COATED ORAL at 08:09

## 2024-09-05 RX ADMIN — CHOLECALCIFEROL TAB 25 MCG (1000 UNIT) 1000 UNITS: 25 TAB at 11:09

## 2024-09-05 RX ADMIN — ENOXAPARIN SODIUM 40 MG: 40 INJECTION SUBCUTANEOUS at 05:09

## 2024-09-05 RX ADMIN — ACETAMINOPHEN 650 MG: 325 TABLET ORAL at 11:09

## 2024-09-05 NOTE — NURSING
Nurses Note -- 4 Eyes      9/5/2024   9:27 AM      Skin assessed during: Q Shift Change      [] No Altered Skin Integrity Present    []Prevention Measures Documented      [x] Yes- Altered Skin Integrity Present or Discovered   [] LDA Added if Not in Epic (Describe Wound)   [x] New Altered Skin Integrity was Present on Admit and Documented in LDA   [] Wound Image Taken    Wound Care Consulted? No    Attending Nurse:  DARYA Osborne    Second RN/Staff Member:  MANUELITO Aquino

## 2024-09-05 NOTE — PT/OT/SLP PROGRESS
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  He presents with the following impairments/functional limitations:  weakness, impaired endurance, gait instability, impaired balance, decreased lower extremity function.    PT Student Note: Pt is doing okay today compared to yesterday. Pt asked to use the RW for ambulation today due to feeling lethargic, pt still independent with functional mobility but was slower today compared to yesterday. Pt still ready for discharge tomorrow and will benefit from outpatient PT. Pt may benefit from use of RW with mobility for improved balance and safety upon d/c to home should he be agreeable to doing so.    Rehab Diagnosis: Ride sided weakness s/p TNK    General Precautions: Standard, fall     Orthopedic Precautions:N/A     Braces:      Rehab Prognosis: Good; patient would benefit from acute skilled PT services to address these deficits and reach maximum level of function.      History:     Past Medical History:   Diagnosis Date    Francois esophagus     Prostate cancer        Past Surgical History:   Procedure Laterality Date    APPENDECTOMY      RADICAL PROSTATECTOMY      TONSILLECTOMY         Subjective     Patient comments: I feel lethargic today. Pt complains of knee soreness    Respiratory Status: Room air    Patients cultural, spiritual, [place] conflicts given the current situation:      Objective:     Communicated with Britney prior to session.  Patient found up in chair with bed alarm  upon PT entry to room.    Pt is  Alert, Cooperative, and Lethargic.    Functional Mobility:        Current   Status  Discharge   Goal   Functional Area: Care Score:    Roll Left and  Right   Independent   Sit to Lying   Independent   Lying to Sitting on Side of Bed   Independent   Sit to Stand 6  Ind c RW Independent   Chair/Bed-to-Chair Transfer   Independent   Car Transfer   Independent   Walk 10 Feet 6 Independent   Walk 50 Feet with Two Turns 6 Independent   Walk 150 Feet 6  Ind c + ft.    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 Independent   Walk 10 Feet Uneven Surface   Independent   1 Step (Curb)   Independent   4 Steps   Independent   12 Steps   Independent   Picking Up Object   Independent   Wheel 50 Feet with Two Turns   Not applicable   Wheel 150 Feet   Not applicable       Therapeutic Activities and Exercises:  Patient educated on role of acute care PT and PT POC and safety while in hospital including calling nurse for mobility  Patient educated about importance of OOB mobility and remaining up in chair most of the day.    Pt completed supine bridges c YTB 3 sets of 8 reps with a 5 second hold at the top.    Pt spent in majority of session working in the // bars working on dynamic balance activities. Tandem, weight shifts, nearly SL balance having 99% of weight on one leg. Used foam, bosu and blue [place] to challenge pt's balance and LE strength.  Pt did well with the activities but struggled to not being touching the bars most of the time.     Activity Tolerance: Fair    Patient left up in chair with call button in reach.    Education provided: gait training, balance training, strengthening exercises, fall prevention, and oriented to student    Expected compliance: High compliance    Plan:     During this hospitalization, patient to be seen 5 x/week (5-7x/week) to address the identified rehab impairments via gait training, therapeutic exercises, neuromuscular re-education, therapeutic activities and progress toward the following goals:    GOALS:   Multidisciplinary Problems       Physical Therapy Goals           Problem: Physical Therapy    Goal Priority Disciplines Outcome Goal Variances Interventions   Physical Therapy Goal     PT, PT/OT Progressing     Description: Transfers:  Car transfer independently.     Mobility:  Ambulate 100 feet on uneven inside/outside independently.   Ascend/descend 12 stairs independently using one handrail.    Pt will lower TUG score to an average of lower than 12 seconds.   Pt will increase Tinneti score to greater than 23.                         Plan of Care Expires:     PT Next Visit Date: 09/10/24  Plan of Care reviewed with: patient    Additional Information:         Time Tracking:     Therapy Time  PT Received On: 09/05/24  PT Start Time: 0930  PT Stop Time: 1030  PT Total Time (min): 60 min   PT Individual: 60  Missed Time:    Time Missed due to:      Billable Minutes: Therapeutic Exercise 20 and Neuromuscular Re-education 40    09/05/2024

## 2024-09-05 NOTE — PROGRESS NOTES
Dos 9/5/24  Patient seen and evaluated in OT today  Continues to participate and make progress toward goals  Working on ADL's and IADL's  Discussed with patient and OT  Reviewed chart and discussed with nursing,  primary medicine team, as well as Vesta Gupta, my NP  Agree with present POC    Subjective  HPI: 84 y.o. WM with a PMH of Francois's esophagus and prostate cancer status post radical prostatectomy presented  to [facility] in [place] on 08/26/24 with acute onset progressively worsening right-sided weakness and possible seizure activity associated with slurred speech, not associated with bladder or bowel incontinence.  Patient had no recollection of the events to hospital admission.  He was witnessed to have rhythmic jerking activity of upper extremity associated with facial twitching requiring Ativan.  He was given tPA on 8/26, and admitted to ICU for neuro checks.  He was also intubated for airway protection.  EEG was ordered and Keppra was continued due to suspicion for seizure activity.  Urgent Cerebral angiogram on 8/26 showed no evidence of large vessel occlusion or flow-limiting stenosis.  He was later extubated with no further issues.  Stroke workup was continued.  Bubble study was negative.  Carotid Doppler showed no significant stenosis. On 8/28, MRI brain showed no acute intracranial changes.  Psychiatry was consulted due to episodic confusion and they recommended adding Remeron at bedtime.  Aspirin, statin, and Keppra were continued and patient was downgraded to hospital medicine service for continuity of care.  His mental status returned to baseline, but patient has no recollection of the events leading to hospital admission.  On 8/27, PT/OT evals completed with deficits noted. On 8/28, speech swallow eval completed with recommendation for MBS due to signs and symptoms of aspiration. On 8/29, MBS showed moderate oropharyngeal dysphagia with SILENT aspiration of thin liquids so patient was  placed on a soft & bite sized diet with mildly thick liquids with medications crushed in puree and aspiration precautions in place. On 8/30, a cognitive eval was completed with functional speech/language and basic cognitive abilities with further assessment of functional cognitive abilities warranted upon rehab transfer due to high level of independent functioning prior. On 8/31, Dr. Trejo, neurology recommended lifelong anticonvulsant due to new onset seizure and no driving, and signed off of case. Patient continued to present with oropharyngeal dysphagia requiring diet modification to reduce risk of aspiration. On 9/2, labs showed low H&H of 13.8 & 41.9, elevated Na of 146, elevated CO2 of 108. Patient had a large BM. On 9/3, labs showed elevated Na of 146. Vital signs stable. Patient is AAOx4.  Participating with therapy. Functional status includes setup/supervision needed for eating and grooming, stand by assist for transfers with no AD, walked 100ft x2 with SBA with RW with CGA to SBA for standing balance, contact guard assist for toileting, supervision bed mobility, and patient was able to cross legs to perform lower body dressing to don/doff footwear. Patient was evaluated, accepted, and admitted to inpatient rehab to improve functional status. Transferred to [facility] on 9/3 without incident.      9/5: Seen with PT, Amb w/o AD. No LOB noted. Progressing well with therapy without complaints. VSSAF.           Review of Systems  Depression/Anxiety: frustration with situation- mood improved    mirtazapine tablet 15 mg qHS. Increase 30mg  Pain: denies     acetaminophen tablet 650 mg q6h PRN mild pain  HYDROcodone-acetaminophen 5-325 mg 1 tablet qd PRN therapy  Bowels/Bladder: last BM 9/5     Appetite: fair -improving    Sleep: decreased-improved      mirtazapine tablet 15 mg qHS. Increase 30mg  hydrOXYzine pamoate capsule 50 mg qHS PRN Insomnia        Physical Exam  General: well-developed, well-nourished, in no  acute distress  Respiratory: equal chest rise, no SOB, no audible wheeze  Cardiovascular: regular rate and rhythm, no edema  Gastrointestinal: soft, non-tender, non-distended   Musculoskeletal: right sided weakness-resolved   Integumentary: no rashes or skin lesions present  Neurologic: cranial nerves intact, no signs of peripheral neurological deficit, motor/sensory function intact  *MD performed and documented physical examination             Assessment/Plan  Hospital   Right sided weakness s/p TNK   New onset seizure   Dysphagia   Delirium     Non-Hospital   Francois esophagus   Prostate cancer   H/O radical prostatectomy       Wounds: none noted  Precautions: aspiration, seizure  Bracing: TBD by therapy  Swallowing: Dysphagia soft & bite sized diet with mildly thick liquids with medications crushed in puree. MBS 9/5-upgraded to Regular Diet and trials of thin liquids   Function: Tolerating therapy. Continue PT/OT  VTE Prophylaxis:   enoxaparin injection 40 mg SubQ q24hr  Code Status: FULL CODE   Discharge:  Lives with his spouse in [place] in a 2-story home however they stay on the first level. 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  Has good family support, including wife.  Children: (3). Date 9/6 Friday.               Marlena Gupta NP, conducted additional independent physical examination and assisted with medical documentation.

## 2024-09-05 NOTE — PT/OT/SLP PROGRESS
Physical Therapy      Patient Name:  Bi Whitley Jr., MD   MRN:  [id number]    Patient not seen today secondary to Patient unwilling to participate.     Pt states his knees are sore and he is just feeling lethargic. Pt states he will participate tomorrow before his discharge.     Amount of therapy minutes missed:  30 Minutes.    Will follow-up 9/6/2024.    9/5/2024

## 2024-09-05 NOTE — PT/OT/SLP PROGRESS
[facility] - Rehabilitation Unit  Speech Language Pathology Department  Dysphagia Therapy Progress Note    Patient Name:  Bi STONER Gutierrez Foster MD   MRN:  [id number]  Admitting Diagnosis: CVA    Recommendations     General recommendations:  dysphagia therapy  Solid texture recommendation:  Regular Diet - IDDSI Level 7  Liquid consistency recommendation: Mildly thick liquids - IDDSI Level 2   Medications: crushed in puree or crushed in mildly thick liquid  Aspiration precautions: small bites/sips, slow rate, and alternate solids/liquids    Discharge therapy intensity:  low intensity   Barriers to safe discharge:  limited insight into deficits    Subjective     Patient alert, flat, and impulsive.  Spiritual/Cultural/[place] Beliefs/Practices that affect care: no    Pain/Comfort: 0/10     Respiratory Status:  room air    Objective       Therapeutic Activities:  Pt completed lingual, labial, base of tongue, and laryngeal exercises x30 with minimal cues.    Assessment     Pt continues to present with oropharyngeal dysphagia requiring diet modification to reduce the risk of aspiration.    Goals     Multidisciplinary Problems       SLP Goals          Problem: SLP    Goal Priority Disciplines Outcome   SLP Goal     SLP    Description: LTG: The pt will tolerate least restrictive diet with no overt s/sx of aspiration.    STGs:  The pt will complete tongue base/laryngeal elevation exercises with minimal cues.  The pt will tolerate 4 oz of thin liquids via cup with no overt s/sx of aspiration.                      Patient Education     Patient provided with verbal education regarding SLP POC.  Understanding was verbalized, however additional teaching warranted.    Plan     Will continue to follow and tx as appropriate.    SLP Follow-Up:  Yes   Patient to be seen:  5 x/week   Plan of Care expires:  09/12/24  Plan of Care reviewed with:   Patient       Time Tracking     SLP Treatment Date:    09/05/24  Speech Start Time:  1500  Speech Stop Time:  1530     Speech Total Time (min):  30 min    Billable minutes:  Treatment of Swallow Dysfunction, 30 minutes       09/05/2024

## 2024-09-05 NOTE — CONSULTS
Inpatient Nutrition Assessment    Admit Date: 9/3/2024   Total duration of encounter: 2 days   Patient Age: 84 y.o.    Nutrition Recommendation/Prescription     Continue Regular diet (IDDSI Level 7) w/ Mildy Thick liquids (IDDSI Level 2).  Avoid spicy foods (GERD).  Diet Consistency per SLP.  Add Boost VHC w/ meals (provides 530 kcal and 22 g pro).  Bowel regimen per MD.  Monitor wt, labs, and po intake.    Communication of Recommendations: reviewed with patient    Nutrition Assessment     Malnutrition Assessment/Nutrition-Focused Physical Exam    Malnutrition Context: acute illness or injury (09/05/24 1332)  Malnutrition Level:  (Does not meet criteria) (09/05/24 1332)  Energy Intake (Malnutrition):  (Does not meet criteria) (09/05/24 1332)  Weight Loss (Malnutrition):  (Does not meet criteria) (09/05/24 1332)  Subcutaneous Fat (Malnutrition):  (Does not meet criteria) (09/05/24 1332)           Muscle Mass (Malnutrition):  (Does not meet criteria) (09/05/24 1332)                          Fluid Accumulation (Malnutrition):  (Does not meet criteria) (09/05/24 1332)  Hand  Strength, Left (Malnutrition):  (Does not meet criteria) (09/05/24 1332)  Hand  Strength, Right (Malnutrition):  (Does not meet criteria) (09/05/24 1332)  A minimum of two characteristics is recommended for diagnosis of either severe or non-severe malnutrition.    Chart Review    Reason Seen: physician consult for eval and treat    Malnutrition Screening Tool Results   Have you recently lost weight without trying?: No  Have you been eating poorly because of a decreased appetite?: No   MST Score: 0   Diagnosis:  Right-sided weakness  Seizures   Anemia  HLD  Barretts esophagus   History of prostate cancer   Oropharyngeal dysphagia   Constipation  GERD  - Avoid spicy foods, and nothing to eat or drink within x2 hours of bedtime or laying flat (water is ok)   Insomnia  Major depressive disorder  ADHD    Relevant Medical History:    Francois  esophagus      Prostate cancer        Scheduled Medications:  aspirin, 81 mg, Daily  atorvastatin, 40 mg, QHS  bisacodyL, 10 mg, Once  docusate sodium, 100 mg, BID  enoxaparin, 40 mg, Daily  famotidine, 20 mg, BID  hydrOXYzine pamoate, 50 mg, QHS  levETIRAcetam, 500 mg, BID  mirtazapine, 30 mg, QHS  vitamin D, 1,000 Units, Daily    Continuous Infusions:   PRN Medications:  acetaminophen, 650 mg, Q6H PRN  benzonatate, 100 mg, TID PRN  bisacodyL, 10 mg, Daily PRN  dextrose 10%, 12.5 g, PRN  dextrose 10%, 25 g, PRN  glucagon (human recombinant), 1 mg, PRN  hydrALAZINE, 10 mg, Q4H PRN  HYDROcodone-acetaminophen, 1 tablet, Daily PRN  labetalol, 10 mg, Q4H PRN  meclizine, 12.5 mg, TID PRN  metoprolol, 10 mg, Q2H PRN  nitroGLYCERIN, 0.4 mg, Q5 Min PRN  ondansetron, 4 mg, Q6H PRN  ondansetron, 8 mg, Q8H PRN  ondansetron, 4 mg, Q8H PRN  polyethylene glycol, 17 g, Q12H PRN  senna, 8.6 mg, Daily PRN  sodium chloride 0.9%, 10 mL, PRN    Calorie Containing IV Medications: no significant kcals from medications at this time    Recent Labs   Lab 08/30/24  0307 09/02/24  0404 09/03/24  0441 09/04/24  0516    146* 146* 146*   K 3.5 4.6 4.4 4.4   CALCIUM 9.4 9.8 9.2 9.4   MG 1.90 2.40  --   --    * 108* 107 107   CO2 22* 29 28 30   BUN 11.9 14.0 16.7 14.9   CREATININE 0.87 1.08 1.18 1.01   EGFRNORACEVR >60 >60 60 >60   GLUCOSE 97 108 105 93   BILITOT 0.8 0.8  --  0.5   ALKPHOS 39* 41  --  41   ALT 52 37  --  39   AST 74* 26  --  29   ALBUMIN 3.2* 3.4  --  3.1*   PREALB  --   --   --  16.7   WBC 5.92 8.33  --  4.18*   HGB 13.4* 13.8*  --  13.1*   HCT 39.0* 41.9*  --  40.3*     Nutrition Orders:  Diet Adult Regular Mildly Thick Liquids (IDDSI Level 2)      Appetite/Oral Intake: poor/25-50% of meals  Factors Affecting Nutritional Intake: decreased appetite  Social Needs Impacting Access to Food: none identified  Food/Synagogue/Cultural Preferences:  likes lemon pie  Food Allergies: no known food allergies  Last Bowel  "Movement: 24  Wound(s):  No pressure injuries documented    Comments    24: Pt reports poor appetite since admit. States appetite and intake was good PTA but now he is just not hungry and is having to force himself to eat. Reports he is eating about 25-50% of meals. Pt open to trial vanilla Boost VHC w/ meals. Diet consistency per SLP recs. Pt denies recent wt loss. Reports constipation, last BM 9/3. Colace and Miralax being administered. Denies n/v.     Anthropometrics    Height: 5' 6" (167.6 cm), Height Method: Stated  Last Weight: 75.5 kg (166 lb 8 oz) (24 142), Weight Method: Standard Scale  BMI (Calculated): 26.9  BMI Classification: overweight (BMI 25-29.9)        Ideal Body Weight (IBW), Male: 142 lb     % Ideal Body Weight, Male (lb): 117.25 %                 Usual Body Weight (UBW), k.5 kg  % Usual Body Weight: 100.24     Usual Weight Provided By: patient    Wt Readings from Last 5 Encounters:   24 75.5 kg (166 lb 8 oz)   24 75.9 kg (167 lb 4.8 oz)   06/15/22 (P) 81 kg (178 lb 9.6 oz)     Weight Change(s) Since Admission: No new weights   Wt Readings from Last 1 Encounters:   24 1422 75.5 kg (166 lb 8 oz)   Admit Weight: 75.5 kg (166 lb 8 oz) (24 1422), Weight Method: Standard Scale    Estimated Needs    Weight Used For Calorie Calculations: 75.5 kg (166 lb 7.2 oz)  Energy Calorie Requirements (kcal): 7982-9032 kcal (25-30 kcal/kg)  Energy Need Method: Kcal/kg  Weight Used For Protein Calculations: 75.5 kg (166 lb 7.2 oz)  Protein Requirements: 60-75 g (0.8-1.0 g/kg)  Fluid Requirements (mL): 1887 mL/d        Enteral Nutrition     Patient not receiving enteral nutrition at this time.    Parenteral Nutrition     Patient not receiving parenteral nutrition support at this time.    Evaluation of Received Nutrient Intake    Calories: not meeting estimated needs  Protein: not meeting estimated needs    Patient Education     Not applicable.    Nutrition Diagnosis "     PES: Inadequate energy intake related to inability to consume sufficient nutrients as evidenced by decreased appetite and po intake. (new)     Nutrition Interventions     Intervention(s): general/healthful diet, commercial beverage, multivitamin/mineral supplement therapy, and collaboration with other providers    Goal: Meet greater than 80% of nutritional needs by follow-up. (new)  Goal: Consume % of oral supplements by follow-up. (new)    Nutrition Goals & Monitoring     Dietitian will monitor: food and beverage intake, energy intake, weight, and gastrointestinal profile  Discharge planning: continue regular diet with Boost mildy thickened oral supplements  Nutrition Risk/Follow-Up: high (follow-up in 1-4 days)   Please consult if re-assessment needed sooner.

## 2024-09-05 NOTE — PROGRESS NOTES
[facility] ([facility])  Rehab Progress Note    Patient Name: Bi STONER Gutierrez Foster MD  MRN: [id number]  Age: 84 y.o. Sex: male  : 1940  Hospital Length of Stay: 2 days  Date of Service: 2024   Chief Complaint: Right-sided weakness s/p TNK on  s/p cerebral angiogram which did not demonstrate any LVO or flow-limiting stenosis on      Subjective:     Basic Information  Admit Information: 84-year-old male presented to [facility] in [place] on  with complaints of right upper extremity weakness and slurred speech.  PMH significant for Francois esophagus and prostate cancer s/p radical prostatectomy.  Upon admit evaluation reported seizing with rhythmic jerking of right upper extremity and facial twitching.  Intubated for airway protection and initiated IV thrombolytic then transferred to [facility] for intervention.  On  tolerated cerebral angiogram which did not demonstrate any LVO or flow-limiting stenosis.  Tolerated extubation.  Psychiatry initiated Remeron 15 mg at bedtime 2/2 insomnia/major depressive disorder.  MRI brain without contrast negative.  Neurology initiated aspirin, statin, and Keppra. Tolerated transfer to [facility] inpatient rehab unit on 9/3 without incident.   Today's Information: No acute events overnight.  Sitting in chair comfortably.  Reports okay sleep and good appetite.  Last BM 9/3.  Vital signs at goal with no recent recorded fevers.  Labs reviewed, TSH within normal limits.  Vitamin-D level on  29.    Review of patient's allergies indicates:  No Known Allergies     Current Facility-Administered Medications:     acetaminophen tablet 650 mg, 650 mg, Oral, Q6H PRN, Krishna Childs MD, 650 mg at 24    aspirin EC tablet 81 mg, 81 mg, Oral, Daily, Lori Linares FNP, 81 mg at 24    atorvastatin tablet 40 mg, 40 mg, Oral, QHS, Lori Linares FNP, 40 mg at 24    benzonatate capsule 100 mg, 100 mg, Oral,  TID PRN, Lori Linares FNP    bisacodyL suppository 10 mg, 10 mg, Rectal, Daily PRNAmadeo Anjum, MD    dextrose 10% bolus 125 mL 125 mL, 12.5 g, Intravenous, PRNAmadeo Anjum, MD    dextrose 10% bolus 250 mL 250 mL, 25 g, Intravenous, PRAmadeo MILLIGAN Anjum, MD    docusate sodium capsule 100 mg, 100 mg, Oral, BID, Lori Linares FNP, 100 mg at 09/05/24 0803    enoxaparin injection 40 mg, 40 mg, Subcutaneous, Daily, Lori Linares FNP, 40 mg at 09/04/24 1700    famotidine tablet 20 mg, 20 mg, Oral, BID, Lori Linares FNP, 20 mg at 09/05/24 0803    glucagon (human recombinant) injection 1 mg, 1 mg, Intramuscular, PRN, Krishna Childs MD    hydrALAZINE injection 10 mg, 10 mg, Intravenous, Q4H PRN, Lori Linares FNP    HYDROcodone-acetaminophen 5-325 mg per tablet 1 tablet, 1 tablet, Oral, Daily PRN, Lori Linares FNP    hydrOXYzine pamoate capsule 50 mg, 50 mg, Oral, Nightly PRN, Lori Linares FNP    labetalol 20 mg/4 mL (5 mg/mL) IV syring, 10 mg, Intravenous, Q4H PRN, Lori Linares FNP    levETIRAcetam tablet 500 mg, 500 mg, Oral, BID, Lori Linares FNP, 500 mg at 09/05/24 0803    meclizine tablet 12.5 mg, 12.5 mg, Oral, TID PRNAmadeo Anjum, MD    melatonin tablet 6 mg, 6 mg, Oral, Nightly PRNAmadeo Anjum, MD    metoprolol injection 10 mg, 10 mg, Intravenous, Q2H PRN, Lori Linares FNP    mirtazapine tablet 30 mg, 30 mg, Oral, QHS, Marlena Gupta FNP, 30 mg at 09/04/24 2023    nitroGLYCERIN SL tablet 0.4 mg, 0.4 mg, Sublingual, Q5 Min PRN, Lori Linares, ALANNA    ondansetron disintegrating tablet 4 mg, 4 mg, Oral, Q6H PRN, Lori Linares, ALANNA    ondansetron disintegrating tablet 8 mg, 8 mg, Oral, Q8H PRN, Lori Linares, TYESHAP    ondansetron injection 4 mg, 4 mg, Intravenous, Q8H PRN, Lori Linares, ALANNA    polyethylene glycol packet 17 g, 17 g, Oral, Q12H PRN, Lori Linares, ALANNA    senna tablet 8.6 mg, 8.6 mg, Oral, Daily PRN, Krishna Childs MD    sodium  "chloride 0.9% flush 10 mL, 10 mL, Intravenous, PRN, Krishna Childs MD     Review of Systems   Complete 12-point review of symptoms negative except for what's mentioned in HPI     Objective:     /71   Pulse 71   Temp 98.5 °F (36.9 °C) (Oral)   Resp 16   Ht 5' 6" (1.676 m)   Wt 75.5 kg (166 lb 8 oz)   SpO2 96%   BMI 26.87 kg/m²      Physical Exam  Vitals reviewed.   Eyes:      Pupils: Pupils are equal, round, and reactive to light.   Cardiovascular:      Rate and Rhythm: Normal rate and regular rhythm.      Heart sounds: Normal heart sounds.   Pulmonary:      Effort: Pulmonary effort is normal.      Breath sounds: Normal breath sounds.   Abdominal:      General: Bowel sounds are normal.   Musculoskeletal:         General: Normal range of motion.   Skin:     General: Skin is warm.   Neurological:      General: No focal deficit present.      Mental Status: He is alert and oriented to person, place, and time.      Motor: Weakness present.   Psychiatric:         Mood and Affect: Mood normal.     *MD performed and documented physical examination       Lines/Drains/Airways       None                   Labs  No results found for this or any previous visit (from the past 24 hour(s)).    Radiology  MRI brain 08/28/2024: Impression: No acute intracranial findings.  Assessment/Plan:     84 y.o. WM admitted on 9/3/2024    Right-sided weakness  - s/p TNK on 08/26  - s/p cerebral angiogram which did not demonstrate any LVO or flow-limiting stenosis on 08/26  - continue                Aspirin 81 mg daily   Atorvastatin 40 mg at night   - follow-up with neurology outpatient     Seizures   - no reported seizures  - seizure precautions in place  - continue                 Keppra 500 mg b.i.d.  - follow-up with neurology outpatient     Anemia  - asymptomatic  - H/H stable   - no evidence of active bleeds  - will closely monitor and transfuse if needed      HLD  - FLP not at goal  - continue                Atorvastatin 40 mg " at night      Barretts esophagus   - stable  - continue                Pepcid 20 mg b.i.d.      History of prostate cancer   - s/p radical prostatectomy  - no current urinary complaints     Oropharyngeal dysphagia   - Continue dysphagia diet with mildly thickened liquids   - Speech therapy following     Constipation  - stable  - continue  Colace 100 mg b.i.d.   MiraLax 17 g b.i.d. as needed constipation     GERD  - Avoid spicy foods, and nothing to eat or drink within x2 hours of bedtime or laying flat (water is ok)   - Avoid NSAIDs (Advil, ibuprofen, naproxen...) and tillman-2 inhibitors (Mobic, Celebrex)    - continue                Pepcid 20 mg b.i.d.      Insomnia  - stable  - continue  Remeron 15 mg at night     Major depressive disorder  - mood stable  - continue  Remeron 15 mg at night     ADHD  - stable  - home medication: Vyvanse 20 mg daily    Insomnia  - stable  - discontinue melatonin p.r.n. on 09/05  - continue   Vistaril 50 mg scheduled at bedtime (initiated 9/5)    Vitamin-D deficiency  - vitamin-D level 29  - continue   Vitamin-D 1000 IU daily (initiated 9/5)     VTE Prophylaxis: Lovenox 40 mg daily     POA: yes- Bi (son)  Living will:  Yes  Contacts: Daphne Whitley (spouse) [phone]                     Bi Whitley (son) [phone]                     Sania Whitley (daughter) [phone]     CODE STATUS: Full  Internal Medicine (attending): Bi Masterson MD  Physiatry (consulting):  Antonio Cee MD     OUTPATIENT PROVIDERS  PCP: Ba Page MD  Neurology: Moo Trejo MD     DISPOSITION:  Vital signs at goal.  Labs reviewed.  TSH level within normal limits.  Appetite at goal.  Last BM 9/3.  Previously required disimpaction while in inpatient at [facility].  Initiate Dulcolax suppository once now, if no BM by 1400 give soapsuds enema.  Continue MiraLax 17 g b.i.d. as needed.  Reports slightly decreased sleep hygiene.  Requesting to discontinue melatonin and initiate Vistaril 50 mg scheduled at  bedtime.  Vitamin-D level on 08/30 29.  Initiate vitamin-D 1000 IU daily.  Continues to progress well with therapies.  Monitor closely.  Notify of any acute changes.     Lori Linares NP conducted independent physical examination and assisted with medical documentation.     Total time spent on this encounter including chart review and direct MD + NP 1-on-1 patient interaction: 52 minutes   Over 50% of this time was spent in counseling and coordination of care

## 2024-09-05 NOTE — PLAN OF CARE
Received 3 emails with home environment images and measurements requested by therapy team.  Forwarded them for review to Tiarra OT, and Vj PT.    1144:  Dr. Cee relayed his plan to discharge pt tomorrow.  Spoke with therapy team.  No family training needed.  Pt will continue to require ST and PT.  Spoke with [facility] (clinic where pt's PT friend, Stephen, works) and was told that they do not offer ST but recommend the clinic next door, [facility].  Spoke with pt and relayed this; he agreed to go to this clinic instead.  FOC on chart.    Called  [facility] ([phone]) and verified their therapy offerings then faxed referral to [phone] for OP PT/ST.    1349:  Confirmed receipt of faxed referral with Cee at [facility] (above).

## 2024-09-05 NOTE — PT/OT/SLP PROGRESS
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  He presents with the following impairments/functional limitations:  impaired endurance, pain .    General Precautions: Standard, fall, seizure     Orthopedic Precautions:N/A     Braces: N/A    Rehab Prognosis: Good and Fair; patient would benefit from acute skilled OT services to address these deficits and reach maximum level of function.      History:     Past Medical History:   Diagnosis Date    Francois esophagus     Prostate cancer        Past Surgical History:   Procedure Laterality Date    APPENDECTOMY      RADICAL PROSTATECTOMY      TONSILLECTOMY         Subjective     Orientation: Oriented x4    Chief Complaint: "My back is hurting and I'm just tired." "3/10"    Patient/Family Comments/goals: "I'm ready to go home."         Respiratory Status: Room air    Patients cultural, spiritual, Yarsanism conflicts given the current situation: no       Objective:     Patient found up in chair with  (HANSEL Tan)  upon OT entry to room.    Mobility   Patient completed:  Sit to Stand Transfer with independence with no assistive device  Stand to Sit Transfer with independence with no assistive device  Toilet Transfer Step Transfer technique with independence with  no AD  Tub Transfer Step Transfer technique with stand by assistance with grab bars    Functional Mobility  Pt. To closet; retrieved clean clothing; placed on door towel bar; adjusted water and stepped into shower. Pt. Performed dynamic standing/showered I'ly.     ADLs   Current Status   Eating 6   Oral Hygiene 6   Shower, Bathe Self 6   Upper Body Dressing 6   Lower Body Dressing 6   Toileting Hygiene 6   Toilet Transfer 6   Putting On, Taking Off Footwear 6     Limiting Factors for ADLs: pain           Additional Treatments: Pt. Returned to bed and stated he was " just tired.     LifeStyle Change and Education:             Patient left supine with call button in reach and bed alarm on.     Education provided: ADLs, transfer training, safety precautions, fall prevention, and home safety. OT recommended Pt. Shower when his wife is in the home for his safety. Pt. Agreed and stated that would not be a problem.     Multidisciplinary Problems       Occupational Therapy Goals          Problem: Occupational Therapy    Goal Priority Disciplines Outcome Interventions   Occupational Therapy Goal     OT, PT/OT Progressing    Description: Pt. Will be Independent c all ADL and FM by D/C.  Pt. Will perform medicine management Ax c [place] by Re-Eval/D/C .  Pt. Will perform IADL c SVN-[place] by Re-Eval.   Pt. Will increase Ax esperanza. To 10 min dynamic/static standing c functional Ax by D/C.  Pt. Will increase core strength and home safety being mindful of seizure Px by D/C.                        Time Tracking     OT Received On: 09/05/24  Time In 1100     Time Out 1130  Total Time 30 min  Therapy Time: OT Individual: 30  Missed Time: 30 Minutes  Missed Time Reason: Patient fatigue, Pain    Billable Minutes: Self Care/Home Management 30 09/05/2024

## 2024-09-05 NOTE — PROCEDURES
"[facility] - Rehabilitation Unit  Speech Language Pathology Department  Modified Barium Swallow (MBS) Study    Patient Name:  Bi Whitley Jr., MD   MRN:  [id number]    Recommendations     General recommendations:  dysphagia therapy and standardized cognitive testing  Repeat MBS study: 7-10 days  Solid texture recommendation:  Regular Diet - IDDSI Level 7  Liquid consistency recommendation: Mildly thick liquids - IDDSI Level 2   Medications: crushed in puree or crushed in mildly thick liquid  Swallow strategies/precautions: small bites/sips, slow rate, and alternate solids/liquids  General precautions:      History     Bi Whitley Jr., 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 Previous MBS completed 8/29/24 with recommendations of soft and bite sized diet and mildly thick liquids.     Past Medical History:   Diagnosis Date    Francois esophagus     Prostate cancer      Past Surgical History:   Procedure Laterality Date    APPENDECTOMY      RADICAL PROSTATECTOMY      TONSILLECTOMY       A MBS Study was completed to assess the efficiency of his swallow function, rule out aspiration and make recommendations regarding safe dietary consistencies, effective compensatory strategies, and safe eating environment.     Home diet texture/consistency: Regular and thin liquids  Current Method of Nutrition: PO diet (soft and bite sized diet and mildly thick liquids)    Patient complaint: "I want normal water"    Subjective     Patient awake, alert, and cooperative.    Spiritual/Cultural/Episcopalian Beliefs/Practices that affect care: no  Pain/Comfort: Pain Rating 1: 0/10    Respiratory Status:  room air    Restraints/positioning devices: none    Fluoroscopic Findings     Oral Musculature  Dentition: own teeth  Secretion Management: adequate  Mucosal Quality: good  Facial Movement: general weakness  Buccal Strength & Mobility: WFL  Mandibular Strength & Mobility: WFL  Oral Labial Strength & Mobility: " WFL  Lingual Strength & Mobility: WFL  Velar Elevation: WFL  Vocal Quality: adequate    Setup  Seated in straight chair  Able to self feed  Adequate head control    Visualization  Lateral view    Oral Phase:   Adequate bolus formation  Adequate mastication  Adequate bolus cohesion  Adequate anterior-posterior transport  Loss of bolus control with liquids    Pharyngeal Phase:   Swallow delay with spill to the pyriform sinuses  Reduced base of tongue retraction  Reduced hyolaryngeal excursion  Reduced airway protection    Consistency Fed by Laryngeal Penetration Aspiration Residue   Mildly thick liquid by cup Self During the swallow  Did NOT clear  Not noted with small sips None Mild-moderate  Valleculae and Posterior pharyngeal wall  Did NOT fully clear despite additional swallow   Mildly thick liquid by straw Self None None Mild-moderate  Valleculae  Did NOT fully clear despite additional swallow   Thin liquid by cup Self During the swallow  Inconsistently cleared None Mild-moderate  Valleculae  Did NOT fully clear despite additional swallow   Puree Self None None Moderate  Valleculae  Did NOT fully clear   Chewable solid Self None None Mild-moderate  Valleculae  Did NOT fully clear     Cervical Esophageal Phase:   UES appeared to accommodate all bolus types without stasis or retrograde movement visualized    Assessment     Pt exhibited mild-moderate oropharyngeal dysphagia characterized by the findings noted above.  Laryngeal penetration of thin liquids visualized and noted to inconsistently clear.  Both swallow safety and efficiency are impaired.     Patient appears to be at low-moderate risk for aspiration related pneumonia when considering severity of dysphagia and reduced airway closure.  Prognosis for behavioral swallow rehabilitation is good.    Goals     Multidisciplinary Problems       SLP Goals          Problem: SLP    Goal Priority Disciplines Outcome   SLP Goal     SLP    Description: LTG: The pt will  tolerate least restrictive diet with no overt s/sx of aspiration.    STGs:  The pt will complete tongue base/laryngeal elevation exercises with minimal cues.  The pt will tolerate 4 oz of thin liquids via cup with no overt s/sx of aspiration.                      Education     Patient provided with verbal education regarding SLP POC.  Understanding was verbalized, however additional teaching warranted.    Plan     SLP Follow-Up:  Yes    Patient to be seen:  5 x/week   Plan of Care expires:  09/12/24  Plan of Care reviewed with:   patient     Time Tracking     SLP Treatment Date:   09/05/24  Speech Start Time:  0830  Speech Stop Time:  0900     Speech Total Time (min):  30 min    Billable minutes:   Motion Fluoroscopic Evaluation, Video Recording, 30 minutes     09/05/2024

## 2024-09-05 NOTE — PLAN OF CARE
"  Problem: Occupational Therapy  Goal: Occupational Therapy Goal  Description: Pt. Will be Independent c all ADL and FM by D/C. MET  Pt. Will perform medicine management Ax c [place] by Re-Eval/D/C . Pt. States he "only takes one pill and metamucil"   Pt. Will perform IADL c SVN-[place] by Re-Eval.   Pt. Will increase Ax esperanza. To 10 min dynamic/static standing c functional Ax by D/C. MET  Pt. Will increase core strength and home safety being mindful of seizure Px by D/C. MET  Outcome: Progressing     "

## 2024-09-05 NOTE — PT/OT/SLP DISCHARGE
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  He presents with the following impairments/functional limitations:  weakness, impaired endurance, impaired functional mobility, gait instability, impaired balance, decreased coordination, decreased upper extremity function, decreased lower extremity function, decreased safety awareness, decreased ROM, impaired coordination .    Rehab Diagnosis:     Recent Surgery:     General Precautions: Standard, fall, seizure     Orthopedic Precautions:N/A     Braces: N/A    Rehab Prognosis: Good; patient would benefit from acute skilled Recreational Therapy services to address these deficits and reach maximum level of function.      Impairments: Balance deficits, Endurance deficits, Mobility deficits, Safety awareness deficits, and Strength deficits  Rehab Potential: Good  Treatment Recommendations: Complete discharge plan  Treatment Diagnosis: R sided weakness, sluured speech, new onset seizure, Fajardo's esophagus, prostrate ca, radical prostatectomy  Orientation: Oriented x4  Affect/Behavior: Cooperative and Impulsive  Safety/Judgement: intact   Basic Command Following: intact  Spiritual Cultural: no        History     Past Medical History:   Diagnosis Date    Fajardo esophagus     Prostate cancer        Past Surgical History:   Procedure Laterality Date    APPENDECTOMY      RADICAL PROSTATECTOMY      TONSILLECTOMY         Home Environment     Admit Date: 09/03/24  Living Situation  People in Home: spouse  Lives in: house  Patients Responsibilities: Caregiver to pet, Community mobility, , Financial management, Health and wellness, Leisure/play/hobbies  Number of Children: 3  Occupation:Semi Retired:  Orthopedic Surgeon    Instrumental Activities of Daily Living     Previous  "Hand Dominance: Right Current Hand Dominance: Right     Other iADL Information:        Cognitive Skills Building         Cognitive Observation Activity Assist Position Equipment Response            Comment:      Dynamic Activities      Activity Assist Position Equipment Response   Activity 1 Golf independence and modified independence Standing Rolling walker and Golf balls, golf club good   Comment: Recliner to rw transfer was I. Ambulated to and from treatment with rw at setup level.  Verbal cues needed to slow his gait down.  Sit to stand was I as was dynamic standing balance/reaching  with no rw. Standing tolerance was 5 minutes.  UE coordination was setup.  Problem solving and sequencing skills were supervision. C/o fatigue and asked to lay on bed before his next treatment.       Fine Motor Activities      Activity Assist Position Equipment Response           Comment:        Goals     Patient Goals  Patient Goal 1: "Return to my normal self."    Short Term Goals    Goal  Goal Status   Will increase activity tolerance to I Met   Will improve dynamic standing balance/reaching to setup Met                 Long Term Goals    Goal Goal Status   Will increase standing tolerance to 10 minutes Progressing   Will improve dynamic standing balance/reaching  to I Met                     Plan       Patient to be seen: Daily  Duration: Other (Comment) (1 day)  Treatments planned: Energy conservation training, Safety education, Coordination, Balance training  Treatment plan/goals established with Patient/Caregiver: Yes     "

## 2024-09-05 NOTE — NURSING
Nurses Note -- 4 Eyes      9/5/2024   9:51 AM      Skin assessed during: Q Shift Change      [x] No Altered Skin Integrity Present    [x]Prevention Measures Documented      [] Yes- Altered Skin Integrity Present or Discovered   [] LDA Added if Not in Epic (Describe Wound)   [] New Altered Skin Integrity was Present on Admit and Documented in LDA   [] Wound Image Taken    Wound Care Consulted? No    Attending Nurse:  DARYA Osborne    Second RN/Staff Member:  MANUELITO Aquino

## 2024-09-05 NOTE — PLAN OF CARE
Problem: Stroke, Ischemic (Includes Transient Ischemic Attack)  Goal: Optimal Coping  Outcome: Progressing     Problem: Fall Injury Risk  Goal: Absence of Fall and Fall-Related Injury  Outcome: Progressing

## 2024-09-05 NOTE — PLAN OF CARE
Discharge PHQ-2 completed (score=2 minimal or no sxs)--unchanged over course of short time on IRF.

## 2024-09-05 NOTE — PT/OT/SLP PROGRESS
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  He presents with the following impairments/functional limitations:  other (comment) (fatigue but agreed to Tx) .    General Precautions: Standard, fall, seizure     Orthopedic Precautions:N/A     Braces: N/A    Rehab Prognosis: Good and Fair; patient would benefit from acute skilled OT services to address these deficits and reach maximum level of function.      History:     Past Medical History:   Diagnosis Date    Francois esophagus     Prostate cancer        Past Surgical History:   Procedure Laterality Date    APPENDECTOMY      RADICAL PROSTATECTOMY      TONSILLECTOMY         Subjective     Orientation: Oriented x4    Chief Complaint: "I'm just tired and a little sore from all the exercises".     Patient/Family Comments/goals: "I'm ready to go home."         Respiratory Status: Room air    Patients cultural, spiritual, Gnosticism conflicts given the current situation: no       Objective:     Patient found supine with bed alarm  upon OT entry to room.    Mobility   Patient completed:  Rolling/Turning to Right with independence  Supine to Sit with independence  Sit to Stand Transfer with independence with no assistive device  Stand to Sit Transfer with independence with no assistive device  Toilet Transfer Step Transfer technique with independence with  no AD and Pt. Lifted seat and stood to urinate    Functional Mobility  Pt. C RW FM room 403< >OT gym. Pt. Agreed to use RW at Ot's request.     ADLs   Current Status   Eating 6   Oral Hygiene 6   Shower, Bathe Self 6   Upper Body Dressing 6   Lower Body Dressing 6   Toileting Hygiene 6 urine +   Toilet Transfer 6   Putting On, Taking Off Footwear 6     Limiting Factors for ADLs:  fatigue          Therapeutic Activities  Pt. Standing independently S AD retrieved large " "pegs from lateral sides and in hand manipulated to correctly place in foam pegboard affixed anteriorly to vertical mirror. Pt. Then removed and replaced pegs in bowl.     Therapeutic Exercise  Pt. In standing performed B UE AROM in both directions x's 5 min at 1.8 norton. Pt. In unsupported short sit performed 15-20 reps B UE AROM c green (heavy resistance) theraband through all joints/planes c brief rest breaks.       Additional Treatments: Pt. Returned to supine I'ly.     LifeStyle Change and Education:             Patient left supine with call button in reach and bed alarm on.     Education provided: transfer training, sequencing, safety precautions, and HEP    Multidisciplinary Problems       Occupational Therapy Goals          Problem: Occupational Therapy    Goal Priority Disciplines Outcome Interventions   Occupational Therapy Goal     OT, PT/OT Progressing    Description: Pt. Will be Independent c all ADL and FM by D/C. MET  Pt. Will perform medicine management Ax c [place] by Re-Eval/D/C . Pt. States he "only takes one pill and metamucil"   Pt. Will perform IADL c SVN-[place] by Re-Eval.   Pt. Will increase Ax esperanza. To 10 min dynamic/static standing c functional Ax by D/C. MET  Pt. Will increase core strength and home safety being mindful of seizure Px by D/C. MET                       Time Tracking     OT Received On: 09/05/24  Time In 1330     Time Out 1410  Total Time 40 min  Therapy Time: OT Individual: 40  Missed Time: 30 Minutes  Missed Time Reason: Patient fatigue, Pain    Billable Minutes: Self Care/Home Management 10, Therapeutic Activity 10, and Therapeutic Exercise 20    09/05/2024  "

## 2024-09-05 NOTE — PROGRESS NOTES
09/05/24 1030   Rec Therapy Time Calculation   Date of Treatment 09/05/24   Rec Start Time 1030   Rec Stop Time 1100   Rec Total Time (min) 30 min   Time   Treatment time 2 units   Charges   $Therapeutic Exercise 2 units   Precautions   General Precautions fall;seizure   Orthopedic Precautions  N/A   Braces N/A   Pain/Comfort   Pain Rating 1 no pain   Vital Signs   Pulse 85   /73   OTHER   Rehab identified problem list/impairments weakness;impaired endurance;impaired functional mobility;gait instability;impaired balance;decreased coordination;decreased upper extremity function;decreased lower extremity function;decreased safety awareness;decreased ROM;impaired coordination   Values/Beliefs/Spiritual Care   Spiritual, Cultural Beliefs, Uatsdin Practices, Values that Affect Care no   Overall Level of Functioning   Activity Tolerance Independent   Dynamic Sitting Balance/Reaching Independent   Dynamic Standing Balance/Reaching Independent   Right UE Coodination/Dexterity Mod Indep   Left UE Coordination/Dexterity Mod Indep   Problem Solving/Sequencing Skills Standby Assist   Memory Recall Standby Assist   R/L Neglect/Inattention Does not occur   Attention Span Mod Indep   Social Interaction Independent   Recreational Therapy Short Term Goals   Short Term Goal 1 Progression Met   Short Term Goal 2 Progression Met   Recreational Therapy Long Term Goals   Long Term Goal 1 Progression Progressing   Long Term Goal 2 Progression Met   Plan   Patient to be seen Daily   Planned Duration Other (Comment)  (1 day)   Treatments Planned Energy conservation training;Safety education;Coordination;Balance training   Treatment plan/goals estblished with Patient/Caregiver Yes

## 2024-09-05 NOTE — NURSING
Nurses Note -- 4 Eyes      9/4/2024   07:05 PM      Skin assessed during: Q Shift Change      [x] No Altered Skin Integrity Present    []Prevention Measures Documented      [] Yes- Altered Skin Integrity Present or Discovered   [] LDA Added if Not in Epic (Describe Wound)   [] New Altered Skin Integrity was Present on Admit and Documented in LDA   [] Wound Image Taken    Wound Care Consulted? No    Attending Nurse:  DARYA Camilo     Second RN/Staff Member:  DARYA Osborne

## 2024-09-06 VITALS
SYSTOLIC BLOOD PRESSURE: 100 MMHG | HEIGHT: 66 IN | RESPIRATION RATE: 17 BRPM | HEART RATE: 88 BPM | OXYGEN SATURATION: 95 % | WEIGHT: 166.5 LBS | DIASTOLIC BLOOD PRESSURE: 65 MMHG | BODY MASS INDEX: 26.76 KG/M2 | TEMPERATURE: 98 F

## 2024-09-06 PROCEDURE — 97535 SELF CARE MNGMENT TRAINING: CPT

## 2024-09-06 PROCEDURE — 25000003 PHARM REV CODE 250: Performed by: NURSE PRACTITIONER

## 2024-09-06 PROCEDURE — 25000003 PHARM REV CODE 250: Performed by: INTERNAL MEDICINE

## 2024-09-06 PROCEDURE — 97530 THERAPEUTIC ACTIVITIES: CPT

## 2024-09-06 RX ORDER — FAMOTIDINE 20 MG/1
20 TABLET, FILM COATED ORAL 2 TIMES DAILY
Qty: 60 TABLET | Refills: 0 | Status: SHIPPED | OUTPATIENT
Start: 2024-09-06 | End: 2024-10-06

## 2024-09-06 RX ORDER — LEVETIRACETAM 500 MG/1
500 TABLET ORAL 2 TIMES DAILY
Qty: 180 TABLET | Refills: 0 | Status: SHIPPED | OUTPATIENT
Start: 2024-09-06 | End: 2024-12-05

## 2024-09-06 RX ORDER — ATORVASTATIN CALCIUM 40 MG/1
40 TABLET, FILM COATED ORAL NIGHTLY
Qty: 90 TABLET | Refills: 0 | Status: SHIPPED | OUTPATIENT
Start: 2024-09-06 | End: 2024-12-05

## 2024-09-06 RX ORDER — CHOLECALCIFEROL (VITAMIN D3) 25 MCG
1000 TABLET ORAL DAILY
Qty: 30 TABLET | Refills: 2 | Status: SHIPPED | OUTPATIENT
Start: 2024-09-07 | End: 2024-12-06

## 2024-09-06 RX ORDER — ASPIRIN 81 MG/1
81 TABLET ORAL DAILY
Qty: 30 TABLET | Refills: 2 | Status: SHIPPED | OUTPATIENT
Start: 2024-09-06 | End: 2024-12-05

## 2024-09-06 RX ORDER — MIRTAZAPINE 15 MG/1
15 TABLET, FILM COATED ORAL NIGHTLY
Qty: 30 TABLET | Refills: 2 | Status: SHIPPED | OUTPATIENT
Start: 2024-09-06 | End: 2024-12-05

## 2024-09-06 RX ADMIN — FAMOTIDINE 20 MG: 20 TABLET, FILM COATED ORAL at 07:09

## 2024-09-06 RX ADMIN — DOCUSATE SODIUM 100 MG: 100 CAPSULE, LIQUID FILLED ORAL at 07:09

## 2024-09-06 RX ADMIN — LEVETIRACETAM 500 MG: 500 TABLET, FILM COATED ORAL at 07:09

## 2024-09-06 RX ADMIN — CHOLECALCIFEROL TAB 25 MCG (1000 UNIT) 1000 UNITS: 25 TAB at 07:09

## 2024-09-06 RX ADMIN — ASPIRIN 81 MG: 81 TABLET, COATED ORAL at 07:09

## 2024-09-06 NOTE — NURSING
Nurses Note -- 4 Eyes      9/6/2024   11:03 AM      Skin assessed during: Q Shift Change      [x] No Altered Skin Integrity Present    [x]Prevention Measures Documented      [] Yes- Altered Skin Integrity Present or Discovered   [] LDA Added if Not in Epic (Describe Wound)   [] New Altered Skin Integrity was Present on Admit and Documented in LDA   [] Wound Image Taken    Wound Care Consulted? No    Attending Nurse:  Mattie Quintana RN/Staff Member:  Anila LACKEY

## 2024-09-06 NOTE — PT/OT/SLP DISCHARGE
[facility] - Rehabilitation Unit  Speech Language Pathology Department  Discharge Summary    Patient Name:  Bi Whitley Jr., MD   MRN:  [id number]    Recommendations:     General recommendations: dysphagia therapy and standardized cognitive testing  Discharge therapy intensity:  Low Intensity Therapy   Diet texture/consistency recommendations:  Regular solids (IDDSI 7) and mildly thick liquids (IDDSI 2)  Medications: crushed in puree  Aspiration precautions: small bites/sips, slow rate, additional swallow per bite/sip, and alternate solids/liquids  General precautions: Standard,    Communication strategies:  none    Hospital Course and Assessment:     Bi Whitley Jr., 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 He was given tPA on 8/26, and admitted to ICU for neuro checks. He was also intubated for airway protection. On 8/29, MBS showed moderate oropharyngeal dysphagia with SILENT aspiration of thin liquids so patient was placed on a soft & bite sized diet with mildly thick liquids with medications crushed in puree and aspiration precautions in place. On 8/30, a cognitive eval was completed with functional speech/language and basic cognitive abilities with further assessment of functional cognitive abilities warranted upon rehab transfer due to high level of independent functioning prior. Repeat MBS on 9/5 shoed mild-moderate oropharyngeal dysphagia characterized by swallow delay with prespill to the pyriform sinuses, reduced tongue base retraction, reduced hyolaryngeal excursion, and reduced airway protection resulting in laryngeal penetration of thin liquids via cup which inconsistently cleared. Mild-moderate pharyngeal residue was also visualized and did NOT fully clear despite cues for additional swallows. Speech, language, and cognition evaluation revealed functional  speech, language, and cognitive skills, however standardized testing continues to be recommended at this time due to previously high level of functioning. Testing unable to be completed during rehab stay due to time constraints. Skilled SLP intervention continues to be warranted at time of discharge.     Goals:     Multidisciplinary Problems       SLP Goals          Problem: SLP    Goal Priority Disciplines Outcome   SLP Goal     SLP    Description: LTG: The pt will tolerate least restrictive diet with no overt s/sx of aspiration.    STGs:  The pt will complete tongue base/laryngeal elevation exercises with minimal cues.  The pt will tolerate 4 oz of thin liquids via cup with no overt s/sx of aspiration.                        Patient Education:     Patient provided with verbal education regarding SLP POC, MBS results, and need for thickened liquids at home. Reviewed where to buy thickener for liquids and various types of thickener that are appropriate (gel, powder).  Understanding was verbalized.    Time Tracking:     SLP Treatment Date:   09/06/24  Speech Start Time:  0930  Speech Stop Time:  0940     Speech Total Time (min):  10 min    Billable minutes:  Self Care/Home Management, 10 minutes       09/06/2024

## 2024-09-06 NOTE — DISCHARGE SUMMARY
[facility] ([facility])  Rehab Discharge Summary    Patient Name: Bi STONER Gutierrez Foster MD  MRN: [id number]  Age: 84 y.o. Sex: male  : 1940  Hospital Length of Stay: 3 days   Date of Service: 2024      Discharge Information   Date of Admission: 9/3/2024  Date of Discharge: 2024  Admit Diagnosis:  Right-sided weakness         Seizures         Anemia         Hyperlipidemia         Barretts esophagus         History of prostate cancer         Oropharyngeal dysphagia         Constipation         GERD         Insomnia         Major depressive disorder         ADHD  Discharge Diagnosis: Right-sided weakness (stable)           Seizures (stable)           Anemia (stable)           Hyperlipidemia (stable)           Barretts esophagus (stable)           History of prostate cancer (stable)           Oropharyngeal dysphagia (stable)           Constipation (stable)           GERD (stable)           Insomnia (stable)           Major depressive disorder (stable)           ADHD (stable)           Insomnia (stable)           Vitamin-D deficiency (stable)    Internal Medicine (attending): Bi Masterson MD  Physiatry (consulting):  Antonio Cee MD     OUTPATIENT PROVIDERS  PCP: Ba Page MD  Neurology: Moo Trejo MD    Hospital Course   84-year-old male presented to [facility] in [place] on  with complaints of right upper extremity weakness and slurred speech.  PMH significant for Francois esophagus and prostate cancer s/p radical prostatectomy.  Upon admit evaluation reported seizing with rhythmic jerking of right upper extremity and facial twitching.  Intubated for airway protection and initiated IV thrombolytic then transferred to [facility] for intervention.  On  tolerated cerebral angiogram which did not demonstrate any LVO or flow-limiting stenosis.  Tolerated extubation.  Psychiatry initiated Remeron 15 mg at bedtime 2/2 insomnia/major depressive disorder.  MRI  "brain without contrast negative.  Neurology initiated aspirin, statin, and Keppra. Tolerated transfer to [facility] inpatient rehab unit on 9/3 without incident.     Remained continent of bowel and bladder throughout inpatient rehab course.  Throughout inpatient rehab course hypernatremia remained stable overall.  Reported previously on levothyroxine 50 mcg q.a.m. per PCP.  With no history of hypothyroidism.  TSH level within normal limits.  Dulcolax suppository once resolved constipation.  Sleep hygiene improved with initiation of Vistaril 50 mg scheduled at bedtime.  Vitamin-D level low on 08/30.  Initiated vitamin-D 1000 IU daily.  Sleep hygiene, appetite, and bowel management remained at goal without remainder of inpatient rehab course. Discharge orders initiated.  Stable for transfer home with home health and family care.  Follow-up with scheduled appointments documented below.    Chief Complaint: Right-sided weakness s/p TNK on 08/26 s/p cerebral angiogram which did not demonstrate any LVO or flow-limiting stenosis on 08/26     /65   Pulse 88   Temp 97.6 °F (36.4 °C) (Oral)   Resp 17   Ht 5' 6" (1.676 m)   Wt 75.5 kg (166 lb 8 oz)   SpO2 95%   BMI 26.87 kg/m²      Physical Exam  Vitals reviewed.   Eyes:      Pupils: Pupils are equal, round, and reactive to light.   Cardiovascular:      Rate and Rhythm: Normal rate and regular rhythm.      Heart sounds: Normal heart sounds.   Pulmonary:      Effort: Pulmonary effort is normal.      Breath sounds: Normal breath sounds.   Abdominal:      General: Bowel sounds are normal.   Musculoskeletal:         General: Normal range of motion.   Skin:     General: Skin is warm.   Neurological:      General: No focal deficit present.      Mental Status: He is alert and oriented to person, place, and time.      Motor: Weakness present.   Psychiatric:         Mood and Affect: Mood normal.      *MD performed and documented physical examination       Labs  Admission on " 09/03/2024   Component Date Value Ref Range Status    Sodium 09/04/2024 146 (H)  136 - 145 mmol/L Final    Potassium 09/04/2024 4.4  3.5 - 5.1 mmol/L Final    Chloride 09/04/2024 107  98 - 107 mmol/L Final    CO2 09/04/2024 30  23 - 31 mmol/L Final    Glucose 09/04/2024 93  82 - 115 mg/dL Final    Blood Urea Nitrogen 09/04/2024 14.9  8.4 - 25.7 mg/dL Final    Creatinine 09/04/2024 1.01  0.73 - 1.18 mg/dL Final    Calcium 09/04/2024 9.4  8.8 - 10.0 mg/dL Final    Protein Total 09/04/2024 5.6 (L)  5.8 - 7.6 gm/dL Final    Albumin 09/04/2024 3.1 (L)  3.4 - 4.8 g/dL Final    Globulin 09/04/2024 2.5  2.4 - 3.5 gm/dL Final    Albumin/Globulin Ratio 09/04/2024 1.2  1.1 - 2.0 ratio Final    Bilirubin Total 09/04/2024 0.5  <=1.5 mg/dL Final    ALP 09/04/2024 41  40 - 150 unit/L Final    ALT 09/04/2024 39  0 - 55 unit/L Final    AST 09/04/2024 29  5 - 34 unit/L Final    eGFR 09/04/2024 >60  mL/min/1.73/m2 Final    Anion Gap 09/04/2024 9.0  mEq/L Final    BUN/Creatinine Ratio 09/04/2024 15   Final    Prealbumin 09/04/2024 16.7  16.0 - 42.0 mg/dL Final    Ferritin Level 09/04/2024 223.31  21.81 - 274.66 ng/mL Final    Iron Binding Capacity Unsaturated 09/04/2024 97  69 - 240 ug/dL Final    Iron Level 09/04/2024 79  65 - 175 ug/dL Final    Transferrin 09/04/2024 159  mg/dL Final    Iron Binding Capacity Total 09/04/2024 176 (L)  250 - 450 ug/dL Final    Iron Saturation 09/04/2024 45  20 - 50 % Final    WBC 09/04/2024 4.18 (L)  4.50 - 11.50 x10(3)/mcL Final    RBC 09/04/2024 3.95 (L)  4.70 - 6.10 x10(6)/mcL Final    Hgb 09/04/2024 13.1 (L)  14.0 - 18.0 g/dL Final    Hct 09/04/2024 40.3 (L)  42.0 - 52.0 % Final    MCV 09/04/2024 102.0 (H)  80.0 - 94.0 fL Final    MCH 09/04/2024 33.2 (H)  27.0 - 31.0 pg Final    MCHC 09/04/2024 32.5 (L)  33.0 - 36.0 g/dL Final    RDW 09/04/2024 12.6  11.5 - 17.0 % Final    Platelet 09/04/2024 240  130 - 400 x10(3)/mcL Final    MPV 09/04/2024 11.7 (H)  7.4 - 10.4 fL Final    Neut % 09/04/2024 51.1   % Final    Lymph % 09/04/2024 25.6  % Final    Mono % 09/04/2024 13.4  % Final    Eos % 09/04/2024 7.2  % Final    Basophil % 09/04/2024 1.7  % Final    Lymph # 09/04/2024 1.07  0.6 - 4.6 x10(3)/mcL Final    Neut # 09/04/2024 2.14  2.1 - 9.2 x10(3)/mcL Final    Mono # 09/04/2024 0.56  0.1 - 1.3 x10(3)/mcL Final    Eos # 09/04/2024 0.30  0 - 0.9 x10(3)/mcL Final    Baso # 09/04/2024 0.07  <=0.2 x10(3)/mcL Final    IG# 09/04/2024 0.04  0 - 0.04 x10(3)/mcL Final    IG% 09/04/2024 1.0  % Final    NRBC% 09/04/2024 0.0  % Final    TSH 09/04/2024 3.188  0.350 - 4.940 uIU/mL Final     Radiology  MRI brain 08/28/2024: Impression: No acute intracranial findings.    Discharge Summary Plan   Discharge Status:  Improved    Location: Discharge home with outpatient PT/ST    Medications: See discharge medicine reconciliation    Activity:  As tolerated    Diet:  Dysphagia diet with mildly thickened liquids    Instructions:  Take all medications as prescribed.      Attend appointments as scheduled.      Return to ED if symptoms worsen, or if t > 100.4.    Education:  Right-sided weakness    Follow-up:   Ba Appiah MD on 9/12 at 11:15 a.m.      Moo Trejo MD on 10/14 at 2:15 p.m.    Discussed plan of care, and patient communicated understanding. Agreed to comply with recommendations.    Lori Linares NP conducted independent examination and assisted with medical documentation.     Discharge Time: 44 minutes

## 2024-09-06 NOTE — NURSING
Discharge instrtuctions given to pt wheeled down to car via staff no distress noted or complaints voiced.

## 2024-09-06 NOTE — PT/OT/SLP PROGRESS
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  He presents with the following impairments/functional limitations:  weakness, impaired endurance, gait instability, impaired balance, decreased lower extremity function.    Rehab Diagnosis: Ride sided weakness s/p TNK    General Precautions: Standard, fall     Orthopedic Precautions:N/A     Braces:      Rehab Prognosis: Good; patient would benefit from acute skilled PT services to address these deficits and reach maximum level of function.      History:     Past Medical History:   Diagnosis Date    Francois esophagus     Prostate cancer        Past Surgical History:   Procedure Laterality Date    APPENDECTOMY      RADICAL PROSTATECTOMY      TONSILLECTOMY         Subjective     Patient comments: "I'm ready to go" and "I'm still trying to figure out what happened to me." And "They say I have swallowing issues, but I don't" and "My hip hurts and is sore today"    Respiratory Status: Room air    Patients cultural, spiritual, Sabianism conflicts given the current situation:      Objective:     Communicated with Vj PT prior to session.  Patient found up in chair upon PT entry to room.    Pt is Alert and Cooperative.    Vitals   Vitals at Rest  /65   HR 88   O2 Sat     Pain         Functional Mobility:      Current   Status  Discharge   Goal   Functional Area: Care Score:    Sit to Stand 6  Ind, with and without RW Independent   Car Transfer 6  Ind, stand step Independent   Walk 10 Feet 6  Ind with RW Independent   Walk 50 Feet with Two Turns 6  Ind with RW Independent   Walk 150 Feet 6  Ind with RW, ambulating from pt room to car simulator and back, several " "times greater than 150ft. Independent   Walk 10 Feet Uneven Surface 6  Ind with and without RW, 10ft on ramp. Independent   1 Step (Curb) 6  Ind with and without RW, 4" curb.  Independent       Therapeutic Activities and Exercises:  Pt seen for PT Last Visit session. Answered all last questions/concerns as appropriate. Pt is ready for DC from hospital later today.        Activity Tolerance: Good    Patient left sitting edge of bed with call button in reach.    Education provided: roles and goals of PT/PTA, gait training, safety awareness, and body mechanics    Expected compliance: Moderate compliance    Plan:     During this hospitalization, patient to be seen 5 x/week (5-7x/week) to address the identified rehab impairments via gait training, therapeutic exercises, neuromuscular re-education, therapeutic activities and progress toward the following goals:    GOALS:   Multidisciplinary Problems       Physical Therapy Goals          Problem: Physical Therapy    Goal Priority Disciplines Outcome Goal Variances Interventions   Physical Therapy Goal     PT, PT/OT Progressing     Description: Transfers:  Car transfer independently.     Mobility:  Ambulate 100 feet on uneven inside/outside independently.   Ascend/descend 12 stairs independently using one handrail.    Pt will lower TUG score to an average of lower than 12 seconds.   Pt will increase Tinneti score to greater than 23.                         Plan of Care Expires:     PT Next Visit Date: 09/10/24  Plan of Care reviewed with: patient    Additional Information:         Time Tracking:     Therapy Time  PT Start Time: 0900  PT Stop Time: 0915  PT Total Time (min): 15 min   PT Individual: 15  Missed Time:    Time Missed due to:      Billable Minutes: Therapeutic Activity 15 min    09/06/2024  "

## 2024-09-06 NOTE — PLAN OF CARE
Discharging today.    Family friend is coming for pickup around 1030/1100.      No home DME ordered/needed.    Outpatient PT/ST arranged at [facility] in [place].

## 2024-09-06 NOTE — PLAN OF CARE
Problem: Rehabilitation ([facility]) Plan of Care  Goal: Plan of Care Review  9/6/2024 0543 by Anila Chapin LPN  Outcome: Progressing  9/5/2024 2257 by Anila Chapin LPN  Outcome: Progressing  Goal: Patient-Specific Goal (Individualized)  9/6/2024 0543 by Anila Chapin LPN  Outcome: Progressing  9/5/2024 2257 by Anila Chapin LPN  Outcome: Progressing  Goal: Absence of New-Onset Illness or Injury  9/6/2024 0543 by Anila Chapin LPN  Outcome: Progressing  9/5/2024 2257 by Anila Cahpin LPN  Outcome: Progressing  Goal: Optimal Comfort and Wellbeing  9/6/2024 0543 by Anila Chapin LPN  Outcome: Progressing  9/5/2024 2257 by Anila Chapin LPN  Outcome: Progressing  Goal: Home and Community Transition Plan Established  9/6/2024 0543 by Anila Chapin LPN  Outcome: Progressing  9/5/2024 2257 by Anila Chapin LPN  Outcome: Progressing

## 2024-09-09 NOTE — PT/OT/SLP DISCHARGE
Physical Therapy Discharge Summary    Name: Сергей Gutierrez Foster MD  MRN: [id number]   Principal Problem: New onset seizure     Patient Discharged from acute Physical Therapy on 09/09/24.     Assessment:     Patient appropriate for care in another setting. All goals met.    Objective:     GOALS:   Multidisciplinary Problems       Physical Therapy Goals          Problem: Physical Therapy    Goal Priority Disciplines Outcome Goal Variances Interventions   Physical Therapy Goal     PT, PT/OT Progressing     Description: Transfers:  Car transfer independently.     Mobility:  Ambulate 100 feet on uneven inside/outside independently.   Ascend/descend 12 stairs independently using one handrail.    Pt will lower TUG score to an average of lower than 12 seconds.   Pt will increase Tinneti score to greater than 23.                         Care Scores:   Admission Assessment Current   Status  Discharge   Goal   Functional Area: Care Score:  Care Score:    Roll Left and Right 6 6 Independent   Sit to Lying 6 6 Independent   Lying to Sitting on Side of Bed 6 6 Independent   Sit to Stand 6 6 Independent   Chair/Bed-to-Chair Transfer 6 6 Independent   Car Transfer 4 6 Independent   Walk 10 Feet 6 6 Independent   Walk 50 Feet with Two Turns 6 6 Independent   Walk 150 Feet 6 6 Independent   Walk 10 Feet Uneven Surface 4 6 Independent   1 Step (Curb) 6 6 Independent   4 Steps 6 6 Independent   12 Steps 6 6 Independent   Picking Up Object 6 6 Independent   Wheel 50 Feet with Two Turns 9 9 Not applicable   Wheel 150 Feet 9 9 Not applicable       Reasons for Discontinuation of Therapy Services  Transfer to alternate level of care. and Satisfactory goal achievement.      Plan:     Patient Discharged to: Outpatient Therapy Services.    9/9/2024

## 2024-09-12 NOTE — PT/OT/SLP DISCHARGE
"Occupational Therapy Discharge Summary    Bi Whitley Jr., MD  MRN: [id number]   Principal Problem: New onset seizure      Patient Discharged from acute Occupational Therapy on 09/6/2024.  Please refer to prior OT note dated 09/5/2024 for functional status.    Assessment:      Patient appropriate for care in another setting.    Objective:     GOALS:   Multidisciplinary Problems       Occupational Therapy Goals          Problem: Occupational Therapy    Goal Priority Disciplines Outcome Interventions   Occupational Therapy Goal     OT, PT/OT Progressing    Description: Pt. Will be Independent c all ADL and FM by D/C. MET  Pt. Will perform medicine management Ax c [place] by Re-Eval/D/C . Pt. States he "only takes one pill and metamucil"   Pt. Will perform IADL c SVN-[place] by Re-Eval.   Pt. Will increase Ax esperanza. To 10 min dynamic/static standing c functional Ax by D/C. MET  Pt. Will increase core strength and home safety being mindful of seizure Px by D/C. MET                       Care Scores:   Admission Assessment Current Status Discharge  Goal   Functional Area: Care Score:  Care Score:    Eating 6 6 Independent   Oral Hygiene 6 6 Independent   Toileting Hygiene 6 6 Independent   Shower/Bathe Self 5 6 Independent   Upper Body Dressing 6 6 Independent   Lower Body Dressing 6 6 Independent   Putting On/Taking Off Footwear 6 6 Independent   Toilet Transfer 4 6 Independent     Reasons for Discontinuation of Therapy Services   Transfer to alternate level of care., Satisfactory goal achievement., and Patient declines to continue care.      Plan:     Patient Discharged to: Outpatient Therapy Services      9/12/2024  "